# Patient Record
Sex: FEMALE | Race: WHITE | NOT HISPANIC OR LATINO | Employment: OTHER | URBAN - METROPOLITAN AREA
[De-identification: names, ages, dates, MRNs, and addresses within clinical notes are randomized per-mention and may not be internally consistent; named-entity substitution may affect disease eponyms.]

---

## 2017-05-01 ENCOUNTER — OFFICE VISIT (OUTPATIENT)
Dept: URGENT CARE | Facility: PHYSICIAN GROUP | Age: 27
End: 2017-05-01
Payer: COMMERCIAL

## 2017-05-01 ENCOUNTER — HOSPITAL ENCOUNTER (OUTPATIENT)
Facility: MEDICAL CENTER | Age: 27
End: 2017-05-01
Attending: NURSE PRACTITIONER
Payer: COMMERCIAL

## 2017-05-01 VITALS
RESPIRATION RATE: 18 BRPM | TEMPERATURE: 99.1 F | OXYGEN SATURATION: 97 % | HEART RATE: 120 BPM | SYSTOLIC BLOOD PRESSURE: 108 MMHG | DIASTOLIC BLOOD PRESSURE: 84 MMHG | WEIGHT: 135 LBS

## 2017-05-01 DIAGNOSIS — J02.9 ACUTE PHARYNGITIS, UNSPECIFIED ETIOLOGY: ICD-10-CM

## 2017-05-01 LAB
INT CON NEG: NEGATIVE
INT CON POS: POSITIVE
S PYO AG THROAT QL: NORMAL

## 2017-05-01 PROCEDURE — 87880 STREP A ASSAY W/OPTIC: CPT | Performed by: NURSE PRACTITIONER

## 2017-05-01 PROCEDURE — 99214 OFFICE O/P EST MOD 30 MIN: CPT | Performed by: NURSE PRACTITIONER

## 2017-05-01 PROCEDURE — 87070 CULTURE OTHR SPECIMN AEROBIC: CPT

## 2017-05-01 RX ORDER — ETONOGESTREL AND ETHINYL ESTRADIOL VAGINAL RING .015; .12 MG/D; MG/D
1 RING VAGINAL
Status: ON HOLD | COMMUNITY
End: 2020-11-24

## 2017-05-01 RX ORDER — HYDROCODONE BITARTRATE AND ACETAMINOPHEN 5; 325 MG/1; MG/1
1-2 TABLET ORAL EVERY 6 HOURS PRN
Qty: 10 TAB | Refills: 0 | Status: ON HOLD | OUTPATIENT
Start: 2017-05-01 | End: 2020-11-24

## 2017-05-01 ASSESSMENT — ENCOUNTER SYMPTOMS
STRIDOR: 0
VOMITING: 0
HEADACHES: 0
SPUTUM PRODUCTION: 0
SWOLLEN GLANDS: 1
WEAKNESS: 0
DIARRHEA: 0
NAUSEA: 0
TROUBLE SWALLOWING: 1
SORE THROAT: 1
SHORTNESS OF BREATH: 0
COUGH: 0
MYALGIAS: 0
CHILLS: 0
FEVER: 0

## 2017-05-01 NOTE — PATIENT INSTRUCTIONS

## 2017-05-01 NOTE — PROGRESS NOTES
Subjective:      Celestine Khalil is a 26 y.o. female who presents with Pharyngitis            HPI Comments: Medications, Allergies and Prior Medical Hx reviewed and updated in [facility].with patient/family today         Pharyngitis   This is a new problem. The current episode started in the past 7 days (4 days). The problem has been gradually worsening. The pain is worse on the right side. There has been no fever. The pain is at a severity of 7/10. Associated symptoms include ear pain, swollen glands and trouble swallowing. Pertinent negatives include no congestion, coughing, diarrhea, ear discharge, headaches, plugged ear sensation, shortness of breath, stridor or vomiting. She has tried NSAIDs for the symptoms. The treatment provided mild relief.       Review of Systems   Constitutional: Positive for malaise/fatigue. Negative for fever and chills.   HENT: Positive for ear pain, sore throat and trouble swallowing. Negative for congestion and ear discharge.    Respiratory: Negative for cough, sputum production, shortness of breath and stridor.    Gastrointestinal: Negative for nausea, vomiting and diarrhea.   Musculoskeletal: Negative for myalgias.   Neurological: Negative for weakness and headaches.          Objective:     /84 mmHg  Pulse 120  Temp(Src) 37.3 °C (99.1 °F)  Resp 18  Wt 61.236 kg (135 lb)  SpO2 97%     Physical Exam   Constitutional: She appears well-developed and well-nourished. No distress.   HENT:   Head: Normocephalic and atraumatic.   Right Ear: Tympanic membrane and ear canal normal.   Left Ear: Tympanic membrane and ear canal normal.   Nose: No rhinorrhea.   Mouth/Throat: Uvula is midline and mucous membranes are normal. No trismus in the jaw. No uvula swelling. Posterior oropharyngeal edema and posterior oropharyngeal erythema present. No oropharyngeal exudate.   Eyes: Conjunctivae are normal. Pupils are equal, round, and reactive to light.   Neck: Neck supple.   Cardiovascular:  Normal rate, regular rhythm and normal heart sounds.    Pulmonary/Chest: Effort normal and breath sounds normal. No respiratory distress. She has no wheezes.   Lymphadenopathy:     She has cervical adenopathy.   Neurological: She is alert.   Skin: Skin is warm and dry.   Psychiatric: She has a normal mood and affect. Her behavior is normal.   Vitals reviewed.              Assessment/Plan:       1. Acute pharyngitis, unspecified etiology  lidocaine viscous 2% (XYLOCAINE) 2 % Solution    hydrocodone-acetaminophen (NORCO) 5-325 MG Tab per tablet    CULTURE THROAT    POCT Rapid Strep A       Rapid Strep - negative    Rest, Fluids, tylenol, ibuprofen, otc throat lozenges, gargle with warm salt water,   Pt will go to the ER for worsening or changing symptoms as discussed,  Follow-up with your primary care provider or return here if not improving in 5 days   Discharge instructions discussed with pt/family who verbalize understanding and agreement with poc    Significant Indicator NEG     Source THRT     Upper Respiratory Culture, Res Heavy growth usual upper respiratory armani   No group A beta Streptococcus isolated.

## 2017-05-01 NOTE — MR AVS SNAPSHOT
Celestine Khalil   2017 9:45 AM   Office Visit   MRN: [id number]    Department:  [facility]   Dept Phone:  [phone]    Description:  Female : 1990   Provider:  DAYDAY Hoff           Reason for Visit     Pharyngitis x3days/       Allergies as of 2017     Allergen Noted Reactions    Penicillins 2007   Hives      You were diagnosed with     Acute pharyngitis, unspecified etiology   [[id number]]         Vital Signs     Blood Pressure Pulse Temperature Respirations Weight Oxygen Saturation    108/84 mmHg 120 37.3 °C (99.1 °F) 18 61.236 kg (135 lb) 97%      Basic Information     Date Of Birth Sex Race Ethnicity Preferred Language    1990 Female White Unknown English      Health Maintenance        Date Due Completion Dates    IMM HEP B VACCINE (1 of 3 - Primary Series) 1990 ---    IMM HEP A VACCINE (1 of 2 - Standard Series) 1991 ---    IMM HPV VACCINE (1 of 3 - Female 3 Dose Series) 2001 ---    IMM VARICELLA (CHICKENPOX) VACCINE (1 of 2 - 2 Dose Adolescent Series) 2003 ---    IMM DTaP/Tdap/Td Vaccine (1 - Tdap) 2009 ---    PAP SMEAR 2011 ---            Current Immunizations     No immunizations on file.      Below and/or attached are the medications your provider expects you to take. Review all of your home medications and newly ordered medications with your provider and/or pharmacist. Follow medication instructions as directed by your provider and/or pharmacist. Please keep your medication list with you and share with your provider. Update the information when medications are discontinued, doses are changed, or new medications (including over-the-counter products) are added; and carry medication information at all times in the event of emergency situations     Allergies:  PENICILLINS - Hives               Medications  Valid as of: May 01, 2017 - 10:00 AM    Generic Name Brand Name Tablet Size Instructions for use    Azithromycin (Tab)  ZITHROMAX 250 MG Take 2 tabs by mouth once today, then one tab by mouth once daily days 2-5.  Complete all medication.        Drospirenone-Ethinyl Estradiol   by Oral route QDAY.         Etonogestrel-Ethinyl Estradiol (RING) NUVARING 0.12-0.015 MG/24HR Insert 1 Each in vagina.        Hydrocodone-Acetaminophen (Tab) NORCO 5-325 MG Take 1-2 Tabs by mouth every 6 hours as needed.        Lidocaine HCl (Solution) XYLOCAINE 2 % Take 15 mL by mouth as needed for Throat/Mouth Pain.        .                 Medicines prescribed today were sent to:     [id number] - [place], [place] - [address] [place] [address]    [address] [place] [address] [place] [address]    Phone: [phone] Fax: [phone]    Open 24 Hours?: No      Medication refill instructions:       If your prescription bottle indicates you have medication refills left, it is not necessary to call your provider’s office. Please contact your pharmacy and they will refill your medication.    If your prescription bottle indicates you do not have any refills left, you may request refills at any time through one of the following ways: The online Secure64 system (except Urgent Care), by calling your provider’s office, or by asking your pharmacy to contact your provider’s office with a refill request. Medication refills are processed only during regular business hours and may not be available until the next business day. Your provider may request additional information or to have a follow-up visit with you prior to refilling your medication.   *Please Note: Medication refills are assigned a new Rx number when refilled electronically. Your pharmacy may indicate that no refills were authorized even though a new prescription for the same medication is available at the pharmacy. Please request the medicine by name with the pharmacy before contacting your provider for a refill.        Your To Do List     Future Labs/Procedures Complete By Expires    CULTURE THROAT  As directed 5/1/2018         Instructions    Pharyngitis  Pharyngitis is redness, pain, and swelling (inflammation) of your pharynx.   CAUSES   Pharyngitis is usually caused by infection. Most of the time, these infections are from viruses (viral) and are part of a cold. However, sometimes pharyngitis is caused by bacteria (bacterial). Pharyngitis can also be caused by allergies. Viral pharyngitis may be spread from person to person by coughing, sneezing, and personal items or utensils (cups, forks, spoons, toothbrushes). Bacterial pharyngitis may be spread from person to person by more intimate contact, such as kissing.   SIGNS AND SYMPTOMS   Symptoms of pharyngitis include:    · Sore throat.    · Tiredness (fatigue).    · Low-grade fever.    · Headache.  · Joint pain and muscle aches.  · Skin rashes.  · Swollen lymph nodes.  · Plaque-like film on throat or tonsils (often seen with bacterial pharyngitis).  DIAGNOSIS   Your health care provider will ask you questions about your illness and your symptoms. Your medical history, along with a physical exam, is often all that is needed to diagnose pharyngitis. Sometimes, a rapid strep test is done. Other lab tests may also be done, depending on the suspected cause.   TREATMENT   Viral pharyngitis will usually get better in 3-4 days without the use of medicine. Bacterial pharyngitis is treated with medicines that kill germs (antibiotics).   HOME CARE INSTRUCTIONS   · Drink enough water and fluids to keep your urine clear or pale yellow.    · Only take over-the-counter or prescription medicines as directed by your health care provider:    ¨ If you are prescribed antibiotics, make sure you finish them even if you start to feel better.    ¨ Do not take aspirin.    · Get lots of rest.    · Gargle with 8 oz of salt water (½ tsp of salt per 1 qt of water) as often as every 1-2 hours to soothe your throat.    · Throat lozenges (if you are not at risk for choking) or sprays may be used to soothe your  throat.  SEEK MEDICAL CARE IF:   · You have large, tender lumps in your neck.  · You have a rash.  · You cough up green, yellow-brown, or bloody spit.  SEEK IMMEDIATE MEDICAL CARE IF:   · Your neck becomes stiff.  · You drool or are unable to swallow liquids.  · You vomit or are unable to keep medicines or liquids down.  · You have severe pain that does not go away with the use of recommended medicines.  · You have trouble breathing (not caused by a stuffy nose).  MAKE SURE YOU:   · Understand these instructions.  · Will watch your condition.  · Will get help right away if you are not doing well or get worse.     This information is not intended to replace advice given to you by your health care provider. Make sure you discuss any questions you have with your health care provider.     Document Released: 12/18/2006 Document Revised: 10/08/2014 Document Reviewed: 08/25/2014  [employer] Patient Education ©2016 Elsevier Inc.            [employer] Access Code: [email or web address]  Expires: 5/31/2017 10:00 AM    Your email address is not on file at [facility].  Email Addresses are required for you to sign up for [employer], please contact [phone] to verify your personal information and to provide your email address prior to attempting to register for [employer].    Celestine Khalil  [address] [place] [address]    [employer]  A secure, online tool to manage your health information     [facility]’s [employer]® is a secure, online tool that connects you to your personalized health information from the privacy of your home -- day or night - making it very easy for you to manage your healthcare. Once the activation process is completed, you can even access your medical information using the [employer] sadaf, which is available for free in the Apple Sadaf store or Google Play store.     To learn more about [employer], visit [email or web address]    There are two levels of access available (as shown below):   My Chart  Features  [facility]  Specialists [facility]  Urgent  Care Non-[facility] Primary [facility] Doctor   Email your healthcare team securely and privately 24/7 X X X    Manage appointments: schedule your next appointment; view details of past/upcoming appointments X      Request prescription refills. X      View recent personal medical records, including lab and immunizations X X X X   View health record, including health history, allergies, medications X X X X   Read reports about your outpatient visits, procedures, consult and ER notes X X X X   See your discharge summary, which is a recap of your hospital and/or ER visit that includes your diagnosis, lab results, and care plan X X  X     How to register for [email or web address]:  Once your e-mail address has been verified, follow the following steps to sign up for [email or web address].     1. Go to  [email or web address]  2. Click on the Sign Up Now box, which takes you to the New Member Sign Up page. You will need to provide the following information:  a. Enter your [email or web address] Access Code exactly as it appears at the top of this page. (You will not need to use this code after you’ve completed the sign-up process. If you do not sign up before the expiration date, you must request a new code.)   b. Enter your date of birth.   c. Enter your home email address.   d. Click Submit, and follow the next screen’s instructions.  3. Create a [email or web address] ID. This will be your [email or web address] login ID and cannot be changed, so think of one that is secure and easy to remember.  4. Create a [email or web address] password. You can change your password at any time.  5. Enter your Password Reset Question and Answer. This can be used at a later time if you forget your password.   6. Enter your e-mail address. This allows you to receive e-mail notifications when new information is available in [email or web address].  7. Click Sign Up. You can now view your health information.    For assistance activating your [email or web address] account, call [phone]

## 2017-05-03 LAB
BACTERIA SPEC RESP CULT: NORMAL
SIGNIFICANT IND 70042: NORMAL
SOURCE SOURCE: NORMAL

## 2018-05-01 ENCOUNTER — TELEMEDICINE2 (OUTPATIENT)
Dept: URGENT CARE | Facility: CLINIC | Age: 28
End: 2018-05-01
Payer: COMMERCIAL

## 2018-05-01 VITALS — WEIGHT: 138 LBS | HEIGHT: 63 IN | BODY MASS INDEX: 24.45 KG/M2

## 2018-05-01 DIAGNOSIS — J01.00 ACUTE NON-RECURRENT MAXILLARY SINUSITIS: ICD-10-CM

## 2018-05-01 PROCEDURE — 99214 OFFICE O/P EST MOD 30 MIN: CPT | Performed by: PHYSICIAN ASSISTANT

## 2018-05-01 RX ORDER — DOXYCYCLINE HYCLATE 100 MG/1
100 CAPSULE ORAL 2 TIMES DAILY
Qty: 14 CAP | Refills: 0 | Status: SHIPPED | OUTPATIENT
Start: 2018-05-01 | End: 2018-05-08

## 2018-05-01 ASSESSMENT — ENCOUNTER SYMPTOMS
SINUS PAIN: 1
SHORTNESS OF BREATH: 0
WHEEZING: 0
SORE THROAT: 0
CHILLS: 0
SINUS PRESSURE: 1
FEVER: 0
PALPITATIONS: 0
EYE REDNESS: 0
HEADACHES: 1
COUGH: 0
EYE DISCHARGE: 0
EYE PAIN: 0
DIZZINESS: 1

## 2018-05-01 ASSESSMENT — PATIENT HEALTH QUESTIONNAIRE - PHQ9: CLINICAL INTERPRETATION OF PHQ2 SCORE: 0

## 2018-05-01 NOTE — PROGRESS NOTES
Subjective:      Celestine Khalil is a 27 y.o. female who presents with Sinus Problem ([id number] Coughing, stuffy nose, body aches)            Sinus Problem   This is a new problem. The current episode started 1 to 4 weeks ago. The problem is unchanged. The pain is moderate. Associated symptoms include congestion, ear pain, headaches and sinus pressure. Pertinent negatives include no chills, coughing, shortness of breath or sore throat. Past treatments include lying down, oral decongestants and saline sprays. The treatment provided no relief.       Review of Systems   Constitutional: Positive for malaise/fatigue. Negative for chills and fever.   HENT: Positive for congestion, ear pain, sinus pain and sinus pressure. Negative for sore throat.    Eyes: Negative for pain, discharge and redness.   Respiratory: Negative for cough, shortness of breath and wheezing.    Cardiovascular: Negative for chest pain and palpitations.   Neurological: Positive for dizziness and headaches.   All other systems reviewed and are negative.    PMH:  has no past medical history on file.  MEDS:   Current Outpatient Prescriptions:   •  Levonorgestrel (KYLEENA IU), by Intrauterine route., Disp: , Rfl:   •  doxycycline (VIBRAMYCIN) 100 MG Cap, Take 1 Cap by mouth 2 times a day for 7 days., Disp: 14 Cap, Rfl: 0  •  ethinyl estradiol-etonogestrel (NUVARING) 0.12-0.015 MG/24HR vaginal ring, Insert 1 Each in vagina., Disp: , Rfl:   •  lidocaine viscous 2% (XYLOCAINE) 2 % Solution, Take 15 mL by mouth as needed for Throat/Mouth Pain., Disp: 120 mL, Rfl: 0  •  hydrocodone-acetaminophen (NORCO) 5-325 MG Tab per tablet, Take 1-2 Tabs by mouth every 6 hours as needed., Disp: 10 Tab, Rfl: 0  •  azithromycin (ZITHROMAX) 250 MG Tab, Take 2 tabs by mouth once today, then one tab by mouth once daily days 2-5.  Complete all medication., Disp: 6 Tab, Rfl: 0  •  BEBETO PO, by Oral route QDAY. , Disp: , Rfl:   ALLERGIES:   Allergies   Allergen Reactions   •  "Penicillins Hives     SURGHX:   Past Surgical History:   Procedure Laterality Date   • TONSILLECTOMY  7/9/08    Performed by NATHAN ROBERTSON at SURGERY SAME DAY [facility] ORS   • ETHMOIDECTOMY  7/9/08    Performed by NATHAN ROBERTSON at SURGERY SAME DAY [facility] ORS   • ANTROSTOMY  7/9/08    Performed by NATHAN ROBERTSON at SURGERY SAME DAY [facility] ORS   • TURBINOPLASTY  7/9/08    Performed by NATHAN ROBERTSON at SURGERY SAME DAY [facility] ORS   • OTHER SURGICAL PROCEDURE      benign lymph node removed, right groin, 2005     SOCHX:  reports that she has never smoked. She does not have any smokeless tobacco history on file. She reports that she does not drink alcohol or use drugs.  FH: Family history was reviewed, no pertinent findings to report  Medications, Allergies, and current problem list reviewed today in Epic       Objective:     Ht 1.6 m (5' 3\")   Wt 62.6 kg (138 lb)   Breastfeeding? No   BMI 24.45 kg/m²      Physical Exam   Constitutional: She is oriented to person, place, and time. She appears well-developed and well-nourished.   Neck: Normal range of motion.   Neurological: She is alert and oriented to person, place, and time.               Assessment/Plan:     1. Acute non-recurrent maxillary sinusitis    - doxycycline (VIBRAMYCIN) 100 MG Cap; Take 1 Cap by mouth 2 times a day for 7 days.  Dispense: 14 Cap; Refill: 0    Encounter was completed using a secure and encrypted videoconferencing equipment, the patient gave verbal consent and patient identification was confirmed.    Differential diagnosis, natural history, supportive care discussed. Follow-up with primary care provider within 7-10 days, emergency room precautions discussed.  Patient and/or family appears understanding of information.    "

## 2019-10-27 ENCOUNTER — OFFICE VISIT (OUTPATIENT)
Dept: URGENT CARE | Facility: PHYSICIAN GROUP | Age: 29
End: 2019-10-27
Payer: COMMERCIAL

## 2019-10-27 VITALS
HEIGHT: 64 IN | SYSTOLIC BLOOD PRESSURE: 100 MMHG | HEART RATE: 93 BPM | TEMPERATURE: 98.2 F | WEIGHT: 143 LBS | DIASTOLIC BLOOD PRESSURE: 76 MMHG | OXYGEN SATURATION: 99 % | BODY MASS INDEX: 24.41 KG/M2

## 2019-10-27 DIAGNOSIS — J30.2 SEASONAL ALLERGIES: ICD-10-CM

## 2019-10-27 DIAGNOSIS — J32.9 SINUSITIS, UNSPECIFIED CHRONICITY, UNSPECIFIED LOCATION: ICD-10-CM

## 2019-10-27 PROCEDURE — 99214 OFFICE O/P EST MOD 30 MIN: CPT | Performed by: NURSE PRACTITIONER

## 2019-10-27 RX ORDER — AZITHROMYCIN 250 MG/1
TABLET, FILM COATED ORAL
Qty: 6 TAB | Refills: 0 | Status: SHIPPED | OUTPATIENT
Start: 2019-10-27 | End: 2019-10-31

## 2019-10-27 ASSESSMENT — ENCOUNTER SYMPTOMS
SPUTUM PRODUCTION: 0
HEADACHES: 0
SHORTNESS OF BREATH: 0
SINUS PRESSURE: 1
WHEEZING: 0
NECK PAIN: 0
SWOLLEN GLANDS: 0
COUGH: 1
HEARTBURN: 0
ABDOMINAL PAIN: 0
CHILLS: 1
SORE THROAT: 1

## 2019-10-27 NOTE — PROGRESS NOTES
Subjective:      Celestine Khalil is a 29 y.o. female who presents with Sinus Problem (head cold, sinus infection, pressure in head, cough )            Sinus Problem   This is a new problem. Episode onset: 7 days. The problem has been gradually worsening since onset. There has been no fever (chills). The pain is mild. Associated symptoms include chills, congestion, coughing, sinus pressure and a sore throat. Pertinent negatives include no headaches, neck pain, shortness of breath, sneezing or swollen glands. (Patient states she has a history of recurrent sinus infections, and seasonal allergies, no history of asthma) Treatments tried: Dayquil and nyquil, Claritin. The treatment provided mild relief.       Review of Systems   Constitutional: Positive for chills.   HENT: Positive for congestion, sinus pressure and sore throat. Negative for sneezing.         Ear pressure, sinus pressure   Respiratory: Positive for cough. Negative for sputum production, shortness of breath and wheezing.    Gastrointestinal: Negative for abdominal pain and heartburn.   Musculoskeletal: Negative for neck pain.   Neurological: Negative for headaches.     History reviewed. No pertinent past medical history.   Past Surgical History:   Procedure Laterality Date   • TONSILLECTOMY  7/9/08    Performed by NATHAN ROBERTSON at SURGERY SAME DAY [facility] ORS   • ETHMOIDECTOMY  7/9/08    Performed by NATHAN ROBERTSON at SURGERY SAME DAY [facility] ORS   • ANTROSTOMY  7/9/08    Performed by NATHAN ROBERTSON at SURGERY SAME DAY [facility] ORS   • TURBINOPLASTY  7/9/08    Performed by NATHAN ROBERTSON at SURGERY SAME DAY [facility] ORS   • OTHER SURGICAL PROCEDURE      benign lymph node removed, right groin, 2005      Social History     Socioeconomic History   • Marital status: Single     Spouse name: Not on file   • Number of children: Not on file   • Years of education: Not on file   • Highest education level: Not on file   Occupational  "History   • Not on file   Social Needs   • Financial resource strain: Not on file   • Food insecurity:     Worry: Not on file     Inability: Not on file   • Transportation needs:     Medical: Not on file     Non-medical: Not on file   Tobacco Use   • Smoking status: Never Smoker   • Smokeless tobacco: Never Used   Substance and Sexual Activity   • Alcohol use: No   • Drug use: No   • Sexual activity: Not on file   Lifestyle   • Physical activity:     Days per week: Not on file     Minutes per session: Not on file   • Stress: Not on file   Relationships   • Social connections:     Talks on phone: Not on file     Gets together: Not on file     Attends Mosque service: Not on file     Active member of club or organization: Not on file     Attends meetings of clubs or organizations: Not on file     Relationship status: Not on file   • Intimate partner violence:     Fear of current or ex partner: Not on file     Emotionally abused: Not on file     Physically abused: Not on file     Forced sexual activity: Not on file   Other Topics Concern   • Not on file   Social History Narrative   • Not on file    Allergies: Penicillins           Objective:     /76   Pulse 93   Temp 36.8 °C (98.2 °F)   Ht 1.626 m (5' 4\")   Wt 64.9 kg (143 lb)   SpO2 99%   BMI 24.55 kg/m²      Physical Exam   Constitutional: She is oriented to person, place, and time. Vital signs are normal. She appears well-developed and well-nourished.   HENT:   Head: Normocephalic and atraumatic.   Right Ear: Tympanic membrane normal.   Left Ear: Tympanic membrane is bulging.   Nose: Rhinorrhea present.   Mouth/Throat: Uvula is midline, oropharynx is clear and moist and mucous membranes are normal. Tonsils are 0 on the right. Tonsils are 0 on the left.   Post nasal drip noted   Neck: Normal range of motion. Neck supple.   Cardiovascular: Normal rate, regular rhythm and normal heart sounds.   Pulmonary/Chest: Effort normal and breath sounds normal. "   Lymphadenopathy:     She has cervical adenopathy.   Neurological: She is alert and oriented to person, place, and time.   Skin: Skin is warm and dry.   Psychiatric: She has a normal mood and affect. Her behavior is normal. Judgment and thought content normal.   Vitals reviewed.              Assessment/Plan:     1. Sinusitis, unspecified chronicity, unspecified location  - azithromycin (ZITHROMAX) 250 MG Tab; Take one tablet the first day and then one table for the next four days  Dispense: 6 Tab; Refill: 0  Contingent antibiotic prescription given to patient to fill upon meeting criteria of guidelines discussed.     2. Seasonal allergies  Educated patient to continue taking Claritin, may include over-the-counter Flonase.  May continue to take NyQuil and DayQuil as needed for symptoms as well as increase fluids to thin secretions.     Supportive care, differential diagnoses, and indications for immediate follow-up discussed with patient.    Pathogenesis of diagnosis discussed including typical length and natural progression of illness. Patient expresses understanding and agrees to plan.       Please note that this dictation was created using voice recognition software. I have made every reasonable attempt to correct obvious errors, but I expect that there are errors of grammar and possibly content that I did not discover before finalizing the note.

## 2020-04-13 LAB
ABO GROUP BLD: NORMAL
HBV SURFACE AG SERPL QL IA: NORMAL
HIV 1+2 AB+HIV1 P24 AG SERPL QL IA: NORMAL
RH BLD: NORMAL
RUBV IGG SERPL IA-ACNC: NORMAL
TREPONEMA PALLIDUM IGG+IGM AB [PRESENCE] IN SERUM OR PLASMA BY IMMUNOASSAY: NORMAL

## 2020-09-09 LAB
GLUCOSE 1H P CHAL SERPL-MCNC: 139 MG/DL
GLUCOSE 2H P CHAL SERPL-MCNC: 104 MG/DL
GLUCOSE 3H P CHAL SERPL-MCNC: 105 MG/DL

## 2020-11-02 LAB — STREP GP B DNA PCR: NEGATIVE

## 2020-11-19 ENCOUNTER — HOSPITAL ENCOUNTER (OUTPATIENT)
Dept: OBGYN | Facility: MEDICAL CENTER | Age: 30
End: 2020-11-19
Attending: OBSTETRICS & GYNECOLOGY
Payer: COMMERCIAL

## 2020-11-19 LAB
COVID ORDER STATUS COVID19: NORMAL
SARS-COV+SARS-COV-2 AG RESP QL IA.RAPID: NOTDETECTED
SPECIMEN SOURCE: NORMAL

## 2020-11-19 PROCEDURE — C9803 HOPD COVID-19 SPEC COLLECT: HCPCS | Performed by: OBSTETRICS & GYNECOLOGY

## 2020-11-19 PROCEDURE — 87426 SARSCOV CORONAVIRUS AG IA: CPT

## 2020-11-19 NOTE — PROGRESS NOTES
PT arrived for prescreening covid swab. POC discussed with pt. All questions answered. Discharge self isolating instructions given/discussed. Swab collected and sent. PT ambulated out in stable condition.

## 2020-11-20 NOTE — H&P
DATE OF SCHEDULED SURGERY:  2020.    CHIEF COMPLAINT:  Here for induction of labor.    HISTORY OF PRESENT ILLNESS:  The patient is a 30-year-old  who presents   to [facility] and Delivery at 39 weeks and 2 days for term elective   induction of labor.  She received her prenatal care with myself this   pregnancy.  It was largely uncomplicated.  She declined any genetic or carrier   screening with this pregnancy.  She had a complete anatomic scan at 19 weeks   and 3 days, demonstrating a normal-appearing female fetus.    REVIEW OF SYSTEMS:  All other systems were reviewed and were found to be   negative.    PAST MEDICAL HISTORY:  Acid reflux.    PAST SURGICAL HISTORY:  Lymph node biopsy, myringotomy, sinus surgery, and   tonsils and adenoid removal.    OBSTETRICAL HISTORY:  This is her first pregnancy.    GYNECOLOGIC HISTORY:  She denies history of sexually transmitted infection   including herpes simplex virus for herself or her spouse.  She denies a   history of abnormal Pap smears.  Most recent Pap smear was negative for   intraepithelial lesions or malignancies in .    FAMILY HISTORY:  Significant for breast cancer, coronary artery disease,   hypertension, diabetes and ovarian cancer.    SOCIAL HISTORY:  Denies tobacco, alcohol or illicit drug use.    MEDICATIONS:  Prenatal vitamins.    ALLERGIES:  No known drug allergies.    PHYSICAL EXAMINATION:  In office on 2020:  VITAL SIGNS:  Her blood pressure was 116/74, her weight was 189 pounds.  GENERAL:  She was in no apparent distress.  ABDOMEN:  Gravid, nontender.  EXTREMITIES:  No edema.  PELVIC:  Her sterile vaginal exam was 4 cm dilated, 80% effaced, -2 station.    PERTINENT LABORATORY RESULTS:  She is ABO O negative; she received RhoGAM on   .  She failed her 1-hour glucose tolerance test and passed her 3-hour   glucose tolerance test.  Her group B strep was negative.  She is HIV negative,   hepatitis B negative, hepatitis C negative.   She is RPR negative and rubella   immune.    ASSESSMENT:  1.  Term intrauterine pregnancy at 39 weeks and 2 days.  2.  Rh negative.    PLAN:  The patient will be admitted to labor and delivery for induction of   labor with Pitocin and artificial rupture of membranes.  She may have an   epidural on demand.  She will need to be assessed for administration of RhoGAM   postpartum.       ____________________________________     MD SHUBHAM Wilder / YOBANI    DD:  11/19/2020 17:53:04  DT:  11/19/2020 22:12:47    D#:  [id number]  Job#:  [id number]

## 2020-11-22 ENCOUNTER — HOSPITAL ENCOUNTER (INPATIENT)
Facility: MEDICAL CENTER | Age: 30
LOS: 2 days | End: 2020-11-25
Attending: OBSTETRICS & GYNECOLOGY | Admitting: OBSTETRICS & GYNECOLOGY
Payer: COMMERCIAL

## 2020-11-22 ENCOUNTER — APPOINTMENT (OUTPATIENT)
Dept: OBGYN | Facility: MEDICAL CENTER | Age: 30
End: 2020-11-22
Attending: OBSTETRICS & GYNECOLOGY
Payer: COMMERCIAL

## 2020-11-22 DIAGNOSIS — R52 PAIN RELATED TO VAGINAL DELIVERY: ICD-10-CM

## 2020-11-22 PROCEDURE — 36415 COLL VENOUS BLD VENIPUNCTURE: CPT

## 2020-11-22 PROCEDURE — 85025 COMPLETE CBC W/AUTO DIFF WBC: CPT

## 2020-11-22 SDOH — HEALTH STABILITY: MENTAL HEALTH: HOW OFTEN DO YOU HAVE 6 OR MORE DRINKS ON ONE OCCASION?: NEVER

## 2020-11-22 SDOH — ECONOMIC STABILITY: FOOD INSECURITY: WITHIN THE PAST 12 MONTHS, YOU WORRIED THAT YOUR FOOD WOULD RUN OUT BEFORE YOU GOT MONEY TO BUY MORE.: PATIENT DECLINED

## 2020-11-22 SDOH — ECONOMIC STABILITY: TRANSPORTATION INSECURITY
IN THE PAST 12 MONTHS, HAS LACK OF TRANSPORTATION KEPT YOU FROM MEETINGS, WORK, OR FROM GETTING THINGS NEEDED FOR DAILY LIVING?: PATIENT DECLINED

## 2020-11-22 SDOH — ECONOMIC STABILITY: TRANSPORTATION INSECURITY
IN THE PAST 12 MONTHS, HAS THE LACK OF TRANSPORTATION KEPT YOU FROM MEDICAL APPOINTMENTS OR FROM GETTING MEDICATIONS?: PATIENT DECLINED

## 2020-11-22 SDOH — HEALTH STABILITY: MENTAL HEALTH: HOW OFTEN DO YOU HAVE A DRINK CONTAINING ALCOHOL?: NEVER

## 2020-11-22 SDOH — ECONOMIC STABILITY: FOOD INSECURITY: WITHIN THE PAST 12 MONTHS, THE FOOD YOU BOUGHT JUST DIDN'T LAST AND YOU DIDN'T HAVE MONEY TO GET MORE.: PATIENT DECLINED

## 2020-11-22 ASSESSMENT — LIFESTYLE VARIABLES
EVER_SMOKED: NEVER
HAVE YOU EVER FELT YOU SHOULD CUT DOWN ON YOUR DRINKING: NO
EVER FELT BAD OR GUILTY ABOUT YOUR DRINKING: NO
TOTAL SCORE: 0
ALCOHOL_USE: NO
TOTAL SCORE: 0
EVER HAD A DRINK FIRST THING IN THE MORNING TO STEADY YOUR NERVES TO GET RID OF A HANGOVER: NO
DOES PATIENT WANT TO STOP DRINKING: NO
CONSUMPTION TOTAL: INCOMPLETE
TOTAL SCORE: 0
HAVE PEOPLE ANNOYED YOU BY CRITICIZING YOUR DRINKING: NO

## 2020-11-22 ASSESSMENT — COPD QUESTIONNAIRES
COPD SCREENING SCORE: 0
DURING THE PAST 4 WEEKS HOW MUCH DID YOU FEEL SHORT OF BREATH: NONE/LITTLE OF THE TIME
IN THE PAST 12 MONTHS DO YOU DO LESS THAN YOU USED TO BECAUSE OF YOUR BREATHING PROBLEMS: DISAGREE/UNSURE
DO YOU EVER COUGH UP ANY MUCUS OR PHLEGM?: NO/ONLY WITH OCCASIONAL COLDS OR INFECTIONS
HAVE YOU SMOKED AT LEAST 100 CIGARETTES IN YOUR ENTIRE LIFE: NO/DON'T KNOW

## 2020-11-22 ASSESSMENT — PATIENT HEALTH QUESTIONNAIRE - PHQ9
2. FEELING DOWN, DEPRESSED, IRRITABLE, OR HOPELESS: NOT AT ALL
1. LITTLE INTEREST OR PLEASURE IN DOING THINGS: NOT AT ALL
SUM OF ALL RESPONSES TO PHQ9 QUESTIONS 1 AND 2: 0

## 2020-11-22 ASSESSMENT — PAIN SCALES - GENERAL: PAINLEVEL: 0 - NO PAIN

## 2020-11-23 ENCOUNTER — ANESTHESIA EVENT (OUTPATIENT)
Dept: ANESTHESIOLOGY | Facility: MEDICAL CENTER | Age: 30
End: 2020-11-23
Payer: COMMERCIAL

## 2020-11-23 ENCOUNTER — ANESTHESIA (OUTPATIENT)
Dept: ANESTHESIOLOGY | Facility: MEDICAL CENTER | Age: 30
End: 2020-11-23
Payer: COMMERCIAL

## 2020-11-23 LAB
BASOPHILS # BLD AUTO: 0.3 % (ref 0–1.8)
BASOPHILS # BLD: 0.03 K/UL (ref 0–0.12)
EOSINOPHIL # BLD AUTO: 0.07 K/UL (ref 0–0.51)
EOSINOPHIL NFR BLD: 0.6 % (ref 0–6.9)
ERYTHROCYTE [DISTWIDTH] IN BLOOD BY AUTOMATED COUNT: 43.3 FL (ref 35.9–50)
HCT VFR BLD AUTO: 40.5 % (ref 37–47)
HGB BLD-MCNC: 13.9 G/DL (ref 12–16)
HOLDING TUBE BB 8507: NORMAL
IMM GRANULOCYTES # BLD AUTO: 0.07 K/UL (ref 0–0.11)
IMM GRANULOCYTES NFR BLD AUTO: 0.6 % (ref 0–0.9)
LYMPHOCYTES # BLD AUTO: 2.03 K/UL (ref 1–4.8)
LYMPHOCYTES NFR BLD: 17.6 % (ref 22–41)
MCH RBC QN AUTO: 31.5 PG (ref 27–33)
MCHC RBC AUTO-ENTMCNC: 34.3 G/DL (ref 33.6–35)
MCV RBC AUTO: 91.8 FL (ref 81.4–97.8)
MONOCYTES # BLD AUTO: 0.84 K/UL (ref 0–0.85)
MONOCYTES NFR BLD AUTO: 7.3 % (ref 0–13.4)
NEUTROPHILS # BLD AUTO: 8.52 K/UL (ref 2–7.15)
NEUTROPHILS NFR BLD: 73.6 % (ref 44–72)
NRBC # BLD AUTO: 0 K/UL
NRBC BLD-RTO: 0 /100 WBC
PLATELET # BLD AUTO: 145 K/UL (ref 164–446)
PMV BLD AUTO: 12.4 FL (ref 9–12.9)
RBC # BLD AUTO: 4.41 M/UL (ref 4.2–5.4)
WBC # BLD AUTO: 11.6 K/UL (ref 4.8–10.8)

## 2020-11-23 PROCEDURE — 700105 HCHG RX REV CODE 258: Performed by: OBSTETRICS & GYNECOLOGY

## 2020-11-23 PROCEDURE — 10907ZC DRAINAGE OF AMNIOTIC FLUID, THERAPEUTIC FROM PRODUCTS OF CONCEPTION, VIA NATURAL OR ARTIFICIAL OPENING: ICD-10-PCS | Performed by: OBSTETRICS & GYNECOLOGY

## 2020-11-23 PROCEDURE — 0UQMXZZ REPAIR VULVA, EXTERNAL APPROACH: ICD-10-PCS | Performed by: OBSTETRICS & GYNECOLOGY

## 2020-11-23 PROCEDURE — 700102 HCHG RX REV CODE 250 W/ 637 OVERRIDE(OP): Performed by: OBSTETRICS & GYNECOLOGY

## 2020-11-23 PROCEDURE — 700111 HCHG RX REV CODE 636 W/ 250 OVERRIDE (IP): Performed by: OBSTETRICS & GYNECOLOGY

## 2020-11-23 PROCEDURE — 3E033VJ INTRODUCTION OF OTHER HORMONE INTO PERIPHERAL VEIN, PERCUTANEOUS APPROACH: ICD-10-PCS | Performed by: OBSTETRICS & GYNECOLOGY

## 2020-11-23 PROCEDURE — 303615 HCHG EPIDURAL/SPINAL ANESTHESIA FOR LABOR

## 2020-11-23 PROCEDURE — 700101 HCHG RX REV CODE 250: Performed by: ANESTHESIOLOGY

## 2020-11-23 PROCEDURE — 10H07YZ INSERTION OF OTHER DEVICE INTO PRODUCTS OF CONCEPTION, VIA NATURAL OR ARTIFICIAL OPENING: ICD-10-PCS | Performed by: OBSTETRICS & GYNECOLOGY

## 2020-11-23 PROCEDURE — 304965 HCHG RECOVERY SERVICES

## 2020-11-23 PROCEDURE — A9270 NON-COVERED ITEM OR SERVICE: HCPCS | Performed by: OBSTETRICS & GYNECOLOGY

## 2020-11-23 PROCEDURE — 59409 OBSTETRICAL CARE: CPT

## 2020-11-23 PROCEDURE — 700111 HCHG RX REV CODE 636 W/ 250 OVERRIDE (IP)

## 2020-11-23 PROCEDURE — 700105 HCHG RX REV CODE 258: Performed by: ANESTHESIOLOGY

## 2020-11-23 PROCEDURE — 770002 HCHG ROOM/CARE - OB PRIVATE (112)

## 2020-11-23 PROCEDURE — 0HQ9XZZ REPAIR PERINEUM SKIN, EXTERNAL APPROACH: ICD-10-PCS | Performed by: OBSTETRICS & GYNECOLOGY

## 2020-11-23 RX ORDER — ROPIVACAINE HYDROCHLORIDE 2 MG/ML
INJECTION, SOLUTION EPIDURAL; INFILTRATION; PERINEURAL
Status: COMPLETED
Start: 2020-11-23 | End: 2020-11-23

## 2020-11-23 RX ORDER — SODIUM CHLORIDE, SODIUM LACTATE, POTASSIUM CHLORIDE, CALCIUM CHLORIDE 600; 310; 30; 20 MG/100ML; MG/100ML; MG/100ML; MG/100ML
INJECTION, SOLUTION INTRAVENOUS PRN
Status: DISCONTINUED | OUTPATIENT
Start: 2020-11-23 | End: 2020-11-25 | Stop reason: HOSPADM

## 2020-11-23 RX ORDER — OXYCODONE HYDROCHLORIDE AND ACETAMINOPHEN 5; 325 MG/1; MG/1
1 TABLET ORAL EVERY 4 HOURS PRN
Status: DISCONTINUED | OUTPATIENT
Start: 2020-11-23 | End: 2020-11-25 | Stop reason: HOSPADM

## 2020-11-23 RX ORDER — SODIUM CHLORIDE, SODIUM LACTATE, POTASSIUM CHLORIDE, AND CALCIUM CHLORIDE .6; .31; .03; .02 G/100ML; G/100ML; G/100ML; G/100ML
1000 INJECTION, SOLUTION INTRAVENOUS
Status: COMPLETED | OUTPATIENT
Start: 2020-11-23 | End: 2020-11-23

## 2020-11-23 RX ORDER — SODIUM CHLORIDE, SODIUM LACTATE, POTASSIUM CHLORIDE, CALCIUM CHLORIDE 600; 310; 30; 20 MG/100ML; MG/100ML; MG/100ML; MG/100ML
INJECTION, SOLUTION INTRAVENOUS CONTINUOUS
Status: ACTIVE | OUTPATIENT
Start: 2020-11-23 | End: 2020-11-23

## 2020-11-23 RX ORDER — ONDANSETRON 2 MG/ML
4 INJECTION INTRAMUSCULAR; INTRAVENOUS ONCE
Status: COMPLETED | OUTPATIENT
Start: 2020-11-23 | End: 2020-11-23

## 2020-11-23 RX ORDER — ROPIVACAINE HYDROCHLORIDE 2 MG/ML
INJECTION, SOLUTION EPIDURAL; INFILTRATION; PERINEURAL CONTINUOUS
Status: DISCONTINUED | OUTPATIENT
Start: 2020-11-23 | End: 2020-11-24 | Stop reason: HOSPADM

## 2020-11-23 RX ORDER — OXYCODONE AND ACETAMINOPHEN 10; 325 MG/1; MG/1
1 TABLET ORAL EVERY 4 HOURS PRN
Status: DISCONTINUED | OUTPATIENT
Start: 2020-11-23 | End: 2020-11-25 | Stop reason: HOSPADM

## 2020-11-23 RX ORDER — ONDANSETRON 2 MG/ML
INJECTION INTRAMUSCULAR; INTRAVENOUS
Status: COMPLETED
Start: 2020-11-23 | End: 2020-11-23

## 2020-11-23 RX ORDER — CALCIUM CARBONATE 500 MG/1
1000 TABLET, CHEWABLE ORAL DAILY
Status: DISCONTINUED | OUTPATIENT
Start: 2020-11-23 | End: 2020-11-23

## 2020-11-23 RX ORDER — BISACODYL 10 MG
10 SUPPOSITORY, RECTAL RECTAL PRN
Status: DISCONTINUED | OUTPATIENT
Start: 2020-11-23 | End: 2020-11-25 | Stop reason: HOSPADM

## 2020-11-23 RX ORDER — CARBOPROST TROMETHAMINE 250 UG/ML
250 INJECTION, SOLUTION INTRAMUSCULAR
Status: DISCONTINUED | OUTPATIENT
Start: 2020-11-23 | End: 2020-11-25 | Stop reason: HOSPADM

## 2020-11-23 RX ORDER — VITAMIN A ACETATE, BETA CAROTENE, ASCORBIC ACID, CHOLECALCIFEROL, .ALPHA.-TOCOPHEROL ACETATE, DL-, THIAMINE MONONITRATE, RIBOFLAVIN, NIACINAMIDE, PYRIDOXINE HYDROCHLORIDE, FOLIC ACID, CYANOCOBALAMIN, CALCIUM CARBONATE, FERROUS FUMARATE, ZINC OXIDE, CUPRIC OXIDE 3080; 12; 120; 400; 1; 1.84; 3; 20; 22; 920; 25; 200; 27; 10; 2 [IU]/1; UG/1; MG/1; [IU]/1; MG/1; MG/1; MG/1; MG/1; MG/1; [IU]/1; MG/1; MG/1; MG/1; MG/1; MG/1
1 TABLET, FILM COATED ORAL EVERY MORNING
Status: DISCONTINUED | OUTPATIENT
Start: 2020-11-24 | End: 2020-11-25 | Stop reason: HOSPADM

## 2020-11-23 RX ORDER — SODIUM CHLORIDE, SODIUM LACTATE, POTASSIUM CHLORIDE, AND CALCIUM CHLORIDE .6; .31; .03; .02 G/100ML; G/100ML; G/100ML; G/100ML
250 INJECTION, SOLUTION INTRAVENOUS PRN
Status: DISCONTINUED | OUTPATIENT
Start: 2020-11-23 | End: 2020-11-24 | Stop reason: HOSPADM

## 2020-11-23 RX ORDER — METHYLERGONOVINE MALEATE 0.2 MG/ML
0.2 INJECTION INTRAVENOUS
Status: DISCONTINUED | OUTPATIENT
Start: 2020-11-23 | End: 2020-11-25 | Stop reason: HOSPADM

## 2020-11-23 RX ORDER — IBUPROFEN 600 MG/1
600 TABLET ORAL EVERY 6 HOURS PRN
Status: DISCONTINUED | OUTPATIENT
Start: 2020-11-23 | End: 2020-11-25 | Stop reason: HOSPADM

## 2020-11-23 RX ORDER — IBUPROFEN 600 MG/1
600 TABLET ORAL EVERY 6 HOURS PRN
Status: DISCONTINUED | OUTPATIENT
Start: 2020-11-23 | End: 2020-11-23

## 2020-11-23 RX ORDER — MISOPROSTOL 200 UG/1
600 TABLET ORAL
Status: DISCONTINUED | OUTPATIENT
Start: 2020-11-23 | End: 2020-11-25 | Stop reason: HOSPADM

## 2020-11-23 RX ORDER — DOCUSATE SODIUM 100 MG/1
100 CAPSULE, LIQUID FILLED ORAL 2 TIMES DAILY PRN
Status: DISCONTINUED | OUTPATIENT
Start: 2020-11-23 | End: 2020-11-25 | Stop reason: HOSPADM

## 2020-11-23 RX ORDER — CALCIUM CARBONATE 500 MG/1
1000 TABLET, CHEWABLE ORAL DAILY
Status: DISCONTINUED | OUTPATIENT
Start: 2020-11-23 | End: 2020-11-25 | Stop reason: HOSPADM

## 2020-11-23 RX ORDER — OXYCODONE HYDROCHLORIDE AND ACETAMINOPHEN 5; 325 MG/1; MG/1
1 TABLET ORAL EVERY 6 HOURS PRN
Status: DISCONTINUED | OUTPATIENT
Start: 2020-11-23 | End: 2020-11-25 | Stop reason: HOSPADM

## 2020-11-23 RX ADMIN — LIDOCAINE HYDROCHLORIDE,EPINEPHRINE BITARTRATE 5 ML: 15; .005 INJECTION, SOLUTION EPIDURAL; INFILTRATION; INTRACAUDAL; PERINEURAL at 13:28

## 2020-11-23 RX ADMIN — SODIUM CHLORIDE, POTASSIUM CHLORIDE, SODIUM LACTATE AND CALCIUM CHLORIDE 1000 ML: 600; 310; 30; 20 INJECTION, SOLUTION INTRAVENOUS at 18:21

## 2020-11-23 RX ADMIN — OXYTOCIN 1 MILLI-UNITS/MIN: 10 INJECTION, SOLUTION INTRAMUSCULAR; INTRAVENOUS at 01:05

## 2020-11-23 RX ADMIN — OXYTOCIN 125 ML/HR: 10 INJECTION, SOLUTION INTRAMUSCULAR; INTRAVENOUS at 21:15

## 2020-11-23 RX ADMIN — SODIUM CHLORIDE, POTASSIUM CHLORIDE, SODIUM LACTATE AND CALCIUM CHLORIDE: 600; 310; 30; 20 INJECTION, SOLUTION INTRAVENOUS at 01:05

## 2020-11-23 RX ADMIN — FENTANYL CITRATE 100 MCG: 50 INJECTION, SOLUTION INTRAMUSCULAR; INTRAVENOUS at 11:32

## 2020-11-23 RX ADMIN — ANTACID TABLETS 1000 MG: 500 TABLET, CHEWABLE ORAL at 01:28

## 2020-11-23 RX ADMIN — IBUPROFEN 600 MG: 600 TABLET, FILM COATED ORAL at 21:36

## 2020-11-23 RX ADMIN — ROPIVACAINE HYDROCHLORIDE 200 MG: 2 INJECTION, SOLUTION EPIDURAL; INFILTRATION; PERINEURAL at 14:00

## 2020-11-23 RX ADMIN — OXYTOCIN 2000 ML/HR: 10 INJECTION, SOLUTION INTRAMUSCULAR; INTRAVENOUS at 19:45

## 2020-11-23 RX ADMIN — ONDANSETRON 4 MG: 2 INJECTION INTRAMUSCULAR; INTRAVENOUS at 18:20

## 2020-11-23 RX ADMIN — ANTACID TABLETS 1000 MG: 500 TABLET, CHEWABLE ORAL at 16:45

## 2020-11-23 RX ADMIN — SODIUM CHLORIDE, POTASSIUM CHLORIDE, SODIUM LACTATE AND CALCIUM CHLORIDE: 600; 310; 30; 20 INJECTION, SOLUTION INTRAVENOUS at 13:00

## 2020-11-23 RX ADMIN — ROPIVACAINE HYDROCHLORIDE 200 MG: 2 INJECTION, SOLUTION EPIDURAL; INFILTRATION at 14:00

## 2020-11-23 RX ADMIN — SODIUM CHLORIDE, POTASSIUM CHLORIDE, SODIUM LACTATE AND CALCIUM CHLORIDE: 600; 310; 30; 20 INJECTION, SOLUTION INTRAVENOUS at 08:52

## 2020-11-23 ASSESSMENT — EDINBURGH POSTNATAL DEPRESSION SCALE (EPDS)
I HAVE BEEN SO UNHAPPY THAT I HAVE HAD DIFFICULTY SLEEPING: NOT AT ALL
I HAVE BEEN ABLE TO LAUGH AND SEE THE FUNNY SIDE OF THINGS: AS MUCH AS I ALWAYS COULD
I HAVE FELT SCARED OR PANICKY FOR NO GOOD REASON: NO, NOT AT ALL
THE THOUGHT OF HARMING MYSELF HAS OCCURRED TO ME: NEVER
THINGS HAVE BEEN GETTING ON TOP OF ME: NO, I HAVE BEEN COPING AS WELL AS EVER
I HAVE FELT SAD OR MISERABLE: NO, NOT AT ALL
I HAVE LOOKED FORWARD WITH ENJOYMENT TO THINGS: AS MUCH AS I EVER DID
I HAVE BLAMED MYSELF UNNECESSARILY WHEN THINGS WENT WRONG: NOT VERY OFTEN
I HAVE BEEN ANXIOUS OR WORRIED FOR NO GOOD REASON: HARDLY EVER
I HAVE BEEN SO UNHAPPY THAT I HAVE BEEN CRYING: NO, NEVER

## 2020-11-23 ASSESSMENT — PAIN DESCRIPTION - PAIN TYPE: TYPE: ACUTE PAIN

## 2020-11-23 NOTE — PROGRESS NOTES
CHALINO  2020   [place] 39w2d    Arrived to L&D for scheduled IOL> admission profile complete, IV started, labs drawn. Dr. Kiran aware pt is 3-/-2. Orders to let patient eat, walk around room. Start pitocin later.     0100- Oxytocin for induction started See MAR. Pt requesting Tums for heartburn   0700- report given to Mahsa BELL.

## 2020-11-23 NOTE — PROGRESS NOTES
OB Progress Note    Uncomfortable with contractions, improved with fentanyl. FHT with baseline of 120's. Moderate variability present. Accelerations present. Decelerations absent. IUPC with contractions every 2 min. SVE 5-6/80/-1. Continue pitocin. Epidural on demand.

## 2020-11-23 NOTE — PROGRESS NOTES
OB Progress Note    Feeling contractions but not uncomfortable. FHT with baseline of 120's. Moderate variability present. Accelerations present. Tocometer with contractions every 2-3 min. SVE 4/70/-2. AROM clear. IUPC placed. Continue pitocin. Epidural on demand. Anticipate vaginal delivery.

## 2020-11-23 NOTE — PROGRESS NOTES
0700- Report received from WOJCIECH Bee RN. Pt is an elective IOL that arrived last night and currently has 6mu of pitocin infusing. Pt would like an epidural as labor becomes more active.  Pt denies LOF or VB. Pt reports +FM.  0730- Dr. Lujan at the bedside, POC discussed to AROM and place IUPC, pt agrees with POC. SVE unchanged, AROM, clr, IUPC placed.  1132- IV fentanyl administered, per pts request. Pt declining epidural at this time.  1145- SVE 5-6/80/-1, Dr. Lujan.  Dr. Lujan aware that IUPC is not tracing MVU's.  1240- LR bolus started for epidural placement. Consents signed.  1300- Dr. Hunt notified that pt is requesting an epidural.  1336- Dr. Hunt at the bedside, timeout complete, epidural placed.  1420- Pt resting with epidural in place, ann placed. SVE unchanged, WESLEY Phillips RN.  1620- Pt requesting to have epidural turned down, pt has a very dense epidural and no tone to her LE's. Epidural rate decreased to 8mL/hr, per Dr. Hunt.  1715- pt has pubic bone pressure, SVE ant lip/+1, WESLEY Phillips, ARABELLA. Dr. Lujan updated on pt's status.  1810- pt feeling pain and pressure, SVE C/+1 to +2, WESLEY Phillips RN. Dr. Lujan updated on labor status, order to labor down.  Epidural rate increased to 10mL/hr for pain.  1850- Report given to WOJCIECH Bee RN.

## 2020-11-24 ENCOUNTER — PHARMACY VISIT (OUTPATIENT)
Dept: PHARMACY | Facility: MEDICAL CENTER | Age: 30
End: 2020-11-24
Payer: COMMERCIAL

## 2020-11-24 PROBLEM — R52 PAIN RELATED TO VAGINAL DELIVERY: Status: ACTIVE | Noted: 2020-11-24

## 2020-11-24 LAB
ACTION RH IMMUNE GLOB 8505RHG: NORMAL
ERYTHROCYTE [DISTWIDTH] IN BLOOD BY AUTOMATED COUNT: 46.3 FL (ref 35.9–50)
HCT VFR BLD AUTO: 40.4 % (ref 37–47)
HGB BLD-MCNC: 13.4 G/DL (ref 12–16)
IMMUNE ROSETTING TEST 8505FMH: NORMAL
MCH RBC QN AUTO: 32 PG (ref 27–33)
MCHC RBC AUTO-ENTMCNC: 33.2 G/DL (ref 33.6–35)
MCV RBC AUTO: 96.4 FL (ref 81.4–97.8)
NUMBER OF RH DOSES IND 8505RD: 1
PLATELET # BLD AUTO: 129 K/UL (ref 164–446)
PMV BLD AUTO: 12.1 FL (ref 9–12.9)
RBC # BLD AUTO: 4.19 M/UL (ref 4.2–5.4)
RH BLD: NORMAL
WBC # BLD AUTO: 14.5 K/UL (ref 4.8–10.8)

## 2020-11-24 PROCEDURE — 770002 HCHG ROOM/CARE - OB PRIVATE (112)

## 2020-11-24 PROCEDURE — 85461 HEMOGLOBIN FETAL: CPT

## 2020-11-24 PROCEDURE — A9270 NON-COVERED ITEM OR SERVICE: HCPCS | Performed by: OBSTETRICS & GYNECOLOGY

## 2020-11-24 PROCEDURE — 86901 BLOOD TYPING SEROLOGIC RH(D): CPT

## 2020-11-24 PROCEDURE — 700102 HCHG RX REV CODE 250 W/ 637 OVERRIDE(OP): Performed by: OBSTETRICS & GYNECOLOGY

## 2020-11-24 PROCEDURE — 85027 COMPLETE CBC AUTOMATED: CPT

## 2020-11-24 PROCEDURE — 36415 COLL VENOUS BLD VENIPUNCTURE: CPT

## 2020-11-24 PROCEDURE — RXMED WILLOW AMBULATORY MEDICATION CHARGE: Performed by: OBSTETRICS & GYNECOLOGY

## 2020-11-24 RX ORDER — IBUPROFEN 600 MG/1
600 TABLET ORAL EVERY 6 HOURS PRN
Qty: 30 TAB | Refills: 0 | Status: SHIPPED | OUTPATIENT
Start: 2020-11-24 | End: 2022-09-06

## 2020-11-24 RX ORDER — OXYCODONE HYDROCHLORIDE AND ACETAMINOPHEN 5; 325 MG/1; MG/1
1 TABLET ORAL EVERY 6 HOURS PRN
Qty: 12 TAB | Refills: 0 | Status: SHIPPED | OUTPATIENT
Start: 2020-11-24 | End: 2020-11-27

## 2020-11-24 RX ORDER — LIDOCAINE HYDROCHLORIDE AND EPINEPHRINE 15; 5 MG/ML; UG/ML
INJECTION, SOLUTION EPIDURAL
Status: DISCONTINUED | OUTPATIENT
Start: 2020-11-23 | End: 2020-11-24 | Stop reason: SURG

## 2020-11-24 RX ADMIN — IBUPROFEN 600 MG: 600 TABLET, FILM COATED ORAL at 17:38

## 2020-11-24 RX ADMIN — IBUPROFEN 600 MG: 600 TABLET, FILM COATED ORAL at 23:43

## 2020-11-24 RX ADMIN — IBUPROFEN 600 MG: 600 TABLET, FILM COATED ORAL at 08:21

## 2020-11-24 RX ADMIN — IBUPROFEN 600 MG: 600 TABLET, FILM COATED ORAL at 03:38

## 2020-11-24 RX ADMIN — PRENATAL WITH FERROUS FUM AND FOLIC ACID 1 TABLET: 3080; 920; 120; 400; 22; 1.84; 3; 20; 10; 1; 12; 200; 27; 25; 2 TABLET ORAL at 08:21

## 2020-11-24 RX ADMIN — ANTACID TABLETS 1000 MG: 500 TABLET, CHEWABLE ORAL at 05:38

## 2020-11-24 ASSESSMENT — PAIN DESCRIPTION - PAIN TYPE
TYPE: ACUTE PAIN

## 2020-11-24 NOTE — ANESTHESIA QCDR
2019 [facility] Clinical Data Registry (for Quality Improvement)     Postoperative nausea/vomiting risk protocol (Adult = 18 yrs and Pediatric 3-17 yrs)- (430 and 463)  General inhalation anesthetic (NOT TIVA) with PONV risk factors: No  Provision of anti-emetic therapy with at least 2 different classes of agents: N/A  Patient DID NOT receive anti-emetic therapy and reason is documented in Medical Record: N/A    Multimodal Pain Management- (477)  Non-emergent surgery AND patient age >= 18: No  Use of Multimodal Pain Management, two or more drugs and/or interventions, NOT including systemic opioids:   Exception: Documented allergy to multiple classes of analgesics:     Smoking Abstinence (404)  Patient is current smoker (cigarette, pipe, e-cig, marijuanna): No  Elective Surgery:   Abstinence instructions provided prior to day of surgery:   Patient abstained from smoking on day of surgery:     Pre-Op Beta-Blocker in Isolated CABG (44)  Isolated CABG AND patient age >= 18: No  Beta-blocker admin within 24 hours of surgical incision:   Exception:of medical reason(s) for not administering beta blocker within 24 hours prior to surgical incision (e.g., not  indicated,other medical reason):     PACU assessment of acute postoperative pain prior to Anesthesia Care End- Applies to Patients Age = 18- (ABG7)  Initial PACU pain score is which of the following: < 7/10  Patient unable to report pain score: N/A    Post-anesthetic transfer of care checklist/protocol to PACU/ICU- (426 and 427)  Upon conclusion of case, patient transferred to which of the following locations: PACU/Non-ICU  Use of transfer checklist/protocol: Yes  Exclusion: Service Performed in Patient Hospital Room (and thus did not require transfer): N/A  Unplanned admission to ICU related to anesthesia service up through end of PACU care- (MD51)  Unplanned admission to ICU (not initially anticipated at anesthesia start time): No

## 2020-11-24 NOTE — PROGRESS NOTES
- Report received from WESLEY fuchs RN. Pt complete in stirups. Ready to Push. Le Petty to bedside.   -  of viable female infant. 8/9 APGARS   - SD of intact placenta

## 2020-11-24 NOTE — DISCHARGE SUMMARY
"Discharge Summary    Admission Date: 2020    Discharge Date: 2020    Diagnosis:Active Problems:     (spontaneous vaginal delivery)    Pain related to vaginal delivery    Subjective: Pain controlled. Normal lochia. Eating, voiding and ambulating without difficulty.Working on breast feeding.    /69   Pulse 85   Temp 36.7 °C (98 °F) (Temporal)   Resp 18   Ht 1.626 m (5' 4\")   Wt 85.7 kg (189 lb)   SpO2 96%   Breastfeeding Yes   BMI 32.44 kg/m²     GEN: NAD  GI:soft, NT, ND  :fundus firm and below umbilicus  EXT:no edema    Hospital Course: Celestine Calderón is a 29 yo  who presented at 39w2d for term elective induction of labor. She is s/p  of a viable female infant with APGARS of 8/9. EBL of 400cc. Bilateral sulcal and labial lacerations repaired. She was meeting all post partum goals and requesting discharge home on PPD#1.     Discharge Instructions   1. Diet : general  2. Activity: Pelvic rest for 6 weeks     Celestine Calderón Jami   Home Medication Instructions JAZIEL:[id number]    Printed on:20 0603   Medication Information                      ibuprofen (MOTRIN) 600 MG Tab  Take 1 Tab by mouth every 6 hours as needed for Moderate Pain.             oxyCODONE-acetaminophen (PERCOCET) 5-325 MG Tab  Take 1 Tab by mouth every 6 hours as needed for up to 3 days.                 Follow up: 6 weeks    Complications:none    Yany Lujan M.D.    "

## 2020-11-24 NOTE — ANESTHESIA PROCEDURE NOTES
Epidural Block    Date/Time: 11/23/2020 1:28 PM  Performed by: Jovani Hunt M.D.  Authorized by: Jovani Hunt M.D.     Patient Location:  OB  Start Time:  11/23/2020 1:28 PM  Reason for Block: labor analgesia    patient identified, IV checked, site marked, risks and benefits discussed, surgical consent, monitors and equipment checked, pre-op evaluation and timeout performed    Patient Position:  Sitting  Prep: ChloraPrep, patient draped and sterile technique    Monitoring:  Blood pressure, continuous pulse oximetry and heart rate  Approach:  Midline  Location:  L3-L4  Injection Technique:  MILDRED saline  Skin infiltration:  Lidocaine  Strength:  1%  Dose:  3ml  Needle Type:  Tuohy  Needle Gauge:  17 G  Needle Length:  3.5 in  Loss of resistance::  9  Catheter Size:  19 G  Catheter at Skin Depth:  15

## 2020-11-24 NOTE — ANESTHESIA TIME REPORT
Anesthesia Start and Stop Event Times     Date Time Event    11/23/2020 1328 Anesthesia Start     1937 Anesthesia Stop        Responsible Staff  11/23/20    Name Role Begin End    Jovani Hunt M.D. Anesth 1328 1937        Preop Diagnosis (Free Text):  Pre-op Diagnosis             Preop Diagnosis (Codes):    Post op Diagnosis  Term pregnancy      Premium Reason  A. 3PM - 7AM    Comments:

## 2020-11-24 NOTE — ANESTHESIA POSTPROCEDURE EVALUATION
Patient: Celestine Calderón    Procedure Summary     Date: 11/23/20 Room / Location:     Anesthesia Start: 1328 Anesthesia Stop: 1937    Procedure: Labor Epidural Diagnosis:     Scheduled Providers:  Responsible Provider: Jovani Hunt M.D.    Anesthesia Type: epidural ASA Status: 2          Final Anesthesia Type: epidural  Last vitals  BP   Blood Pressure: 115/75    Temp   36.6 °C (97.8 °F)    Pulse   Pulse: 97   Resp   18    SpO2   90 %      Anesthesia Post Evaluation    Patient location during evaluation: PACU  Patient participation: complete - patient participated  Level of consciousness: awake and alert    Airway patency: patent  Anesthetic complications: no  Cardiovascular status: hemodynamically stable  Respiratory status: acceptable  Hydration status: euvolemic    PONV: none

## 2020-11-24 NOTE — PROGRESS NOTES
OB Progress Note    Increased pressure and bloody show. FHT with baseline of 120's. Moderate variability present. Accelerations present. Decelerations absent. IUPC with contractions every 1-2 min. SVE AL/C/+1 per Mahsa BELL. Initiate pushing when complete.

## 2020-11-24 NOTE — PROGRESS NOTES
2230 Pt up to bathroom with steady gait. Unable to void, fundus firm, bleeding WNL. Misty care done, pt assisted to w/c.   2245 Pt transferred to postpartum unit in stable condition. Report given to ARABELLA Paredes. Bands and Cuddles verified.

## 2020-11-24 NOTE — PROGRESS NOTES
Patient arrived in wheelchair accompanied by FOB . Report received and bands verified with ARABELLA Villalba. Patient and FOB oriented to unit, room, call light, emergency pull cord, safe sleeping policy, feeding frequency and plan of care. Will continue to monitor patients pain and bleeding per protocol.

## 2020-11-24 NOTE — L&D DELIVERY NOTE
"Delivery Summary    Celestine Calderón is a 29 yo  who presented at 39w2d for term elective induction of labor. She was induced with pitocin. She underwent AROM. She received an epidural for anesthesia. She progressed to complete dilation and with pushing efforts she underwent a spontaneous vaginal delivery of a viable female infant \"Jane\" in JUSTINE position with APGARS 8/9. The head delivered atraumatically. The anterior shoulder delivered with gentle downward pressure and the remainder of the body followed. The infant was placed on the maternal chest. The cord was clamped and cut after a 30 second delay. Pitocin was administered. The placenta was delivered with gentle pressure. The uterus firmed with fundal pressure and pitocin. The perineum was examined and a bilateral labials and bilateral sulcal lacerations were repaired with 3.0 chromic in the usual fashion.    EBL: 400cc  Anesthesia: epidural  Complications: None    Yany Lujan M.D.      "

## 2020-11-24 NOTE — CARE PLAN
Problem: Pain  Goal: Alleviation of Pain or a reduction in pain to the patient's comfort goal  Outcome: PROGRESSING AS EXPECTED  Intervention: Pain Management - Epidural/Spinal  Note: Epidural placed for pain management to meet pt's comfort goal.     Problem: Risk for Infection, Impaired Wound Healing  Goal: Remain free from signs and symptoms of infection  Outcome: PROGRESSING AS EXPECTED  Intervention: Limit vaginal exams as necessary  Note: Vaginal exams limited to reduce risk of infection.

## 2020-11-25 VITALS
SYSTOLIC BLOOD PRESSURE: 115 MMHG | HEIGHT: 64 IN | HEART RATE: 66 BPM | RESPIRATION RATE: 18 BRPM | TEMPERATURE: 97.7 F | DIASTOLIC BLOOD PRESSURE: 79 MMHG | BODY MASS INDEX: 32.27 KG/M2 | WEIGHT: 189 LBS | OXYGEN SATURATION: 96 %

## 2020-11-25 PROCEDURE — 700102 HCHG RX REV CODE 250 W/ 637 OVERRIDE(OP): Performed by: OBSTETRICS & GYNECOLOGY

## 2020-11-25 PROCEDURE — A9270 NON-COVERED ITEM OR SERVICE: HCPCS | Performed by: OBSTETRICS & GYNECOLOGY

## 2020-11-25 RX ADMIN — IBUPROFEN 600 MG: 600 TABLET, FILM COATED ORAL at 05:45

## 2020-11-25 RX ADMIN — IBUPROFEN 600 MG: 600 TABLET, FILM COATED ORAL at 12:51

## 2020-11-25 RX ADMIN — PRENATAL WITH FERROUS FUM AND FOLIC ACID 1 TABLET: 3080; 920; 120; 400; 22; 1.84; 3; 20; 10; 1; 12; 200; 27; 25; 2 TABLET ORAL at 09:16

## 2020-11-25 RX ADMIN — ANTACID TABLETS 1000 MG: 500 TABLET, CHEWABLE ORAL at 05:46

## 2020-11-25 ASSESSMENT — PAIN DESCRIPTION - PAIN TYPE: TYPE: ACUTE PAIN

## 2020-11-25 NOTE — DISCHARGE SUMMARY
DATE OF ADMISSION:  11/22/2020    DATE OF DISCHARGE:  11/25/2020    ADMITTING DIAGNOSIS:  Pregnancy at 39 and 2/7th weeks for induction of labor.    DISCHARGE DIAGNOSES:  1.  Pregnancy at 39 and 2/7th weeks for induction of labor.  2.  Status post normal spontaneous vaginal delivery.    HOSPITAL COURSE IN DETAIL:  This patient was admitted on the aforementioned   date for elective induction with Pitocin.  She was eventually AROM, received   an epidural, progressed over normal labor curve and eventually delivered a   female infant with Apgars of 8 and 9.  The patient and baby recovered in   stable condition.  On postpartum day #1, she was doing well without complaint,   tolerating regular diet with minimal lochia.  Her H and H is stable at 13 and   40.  Today, postpartum day #2, she desires discharge home.    PHYSICAL EXAMINATION:  VITAL SIGNS:  She is afebrile.  Her vital signs are within normal limits.  ABDOMEN:  Soft with full fundus below umbilicus.    ASSESSMENT:  At this time is postpartum day #2, status post normal spontaneous   vaginal delivery, doing well, desires discharge home.    PLAN:  At this time:  1.  Discharge home.  2.  Follow up in 6 weeks.  3.  Pelvic rest.  4.  Lifting precautions.  5.  Scripts for Motrin and Percocet consented and written.       ____________________________________     MD SHANIQUE Worthington / YOBANI    DD:  11/25/2020 05:54:23  DT:  11/25/2020 09:00:16    D#:  [id number]  Job#:  [id number]

## 2020-11-25 NOTE — LACTATION NOTE
followup visit. MOB able to independently latch baby with deep comfortable latch. Reviewed post d/c bfdg resources.

## 2020-11-25 NOTE — PROGRESS NOTES
Received report from ARABELLA Hedrick. Assumed patient care. POC discussed with patient. No additional needs at this time.

## 2020-11-25 NOTE — PROGRESS NOTES
Hospital Day : 2    S: good pain control; min bleed; cramp; bfeed    O:  Vitals:    20 2208 20 2318 20 0544 20 1800   BP: 122/78 115/75 120/69 128/88   Pulse: 98 97 85 100   Resp:  18 18 18   Temp:  36.6 °C (97.8 °F) 36.7 °C (98 °F) 36.7 °C (98 °F)   TempSrc:  Temporal Temporal Temporal   SpO2:  90% 96% 96%   Weight:       Height:           Recent Labs     20  2150 20  0601   WBC 11.6* 14.5*   RBC 4.41 4.19*   HEMOGLOBIN 13.9 13.4   HEMATOCRIT 40.5 40.4   MCV 91.8 96.4   MCH 31.5 32.0   MCHC 34.3 33.2*   RDW 43.3 46.3   PLATELETCT 145* 129*   MPV 12.4 12.1             abd soft ff    A: pp2 sp  doing well    P: dc

## 2020-11-25 NOTE — LACTATION NOTE
followup visit. Baby awake, FOB changed diaper, Baby showing hunger cues. MOB reports baby is tongue thrusting and chomping at breast, and mentions tongue tie. With MOB permission, I did oral exam of baby. Upper lip frenum wraps under gum, upper lip flanges easily. Palate arched, baby begins to suck with chomping/biting motion of jaw, and then settles in to coordinated suck. Lingual cupping strong, lateralizes to left and right. With manual lingual lift, no frenum visualized. FOB taught to use his finger to help soothe baby. Baby then placed on MOB's abdomen, briefly moves, then begins to cry. MOB hand expresses drops of freely flowing colostrum. Baby moved to right breast, and with MOB permission I latched baby, sandwiching breast for a deeper latch. MOB then taught how to hold in cross cradle position and how to support the weight of breast for baby. MOB denies pain with suckling and after 1-2 minutes 1:1 suck:swallows observed. MOB taught how to visualize swallows.  Parents taught importance of skin to skin especially during first 2 weeks when baby is learning how to latch, how to treat engorgement, spoon or finger feeding baby is at any time they are concerned with feedings, and to be sure baby breastfeeds at least 8 times every 24 hours. Discussed cluster feeding and nighttime behaviors.Discussed how to manage engorgement and reviewed post d/c bfdg resources.  MOB encouraged to stay until baby has 1 more feeding so MOB can feel confident independently latching baby prior to d/c.  MOB encouraged to call for latch assist when baby cues again.   Plan:  Skin to skin with parent when parent is awake.  Feed on cue without time restrictions, at minimum 8 times every 24 hours  Expect cluster feeding, especially at nighttime

## 2020-11-25 NOTE — PROGRESS NOTES
0700 - Bedside report received from Lena WEAVER RN. Patient care assumed. Chart, prenatal labs, and orders reviewed  0900 - Pt assessment complete, wnl. Fundus firm with minimal discharge. Pt ambulating to bathroom and voiding without difficulty. Patient denies any dizziness or lightheadedness, any calf pain/tenderness, chest pain or SOB, respiratory symptoms, or any recent ill contacts. Kindly reminded patient and support person to wear face masks when staff is present in room. Plan of care discussed with patient for the day including infant feeding every 2-3 hours or on demand, pain management, and ambulation in halls. Pain medication plan discussed with patient; patient states she will call if PRN pain medication is wanted. All questions/concerns addressed at this time. Call light within reach, encouraged to call with needs. Will continue with routine postpartum cares and proceed with discharge home.

## 2020-11-25 NOTE — PROGRESS NOTES
Discharge instruction for mom and baby discussed. Patient already received her prescriptions. Emphasized the importance of  screening follow-up test. Questions and concerns have been answered. Baby's ID band matches with MOB.

## 2020-11-25 NOTE — DISCHARGE PLANNING
Medication reconcilliation completed. Medications delivered to patient at bedside. Patient counseled.     Celestine Calderón Jami   Home Medication Instructions JAZIEL:[id number]    Printed on:11/24/20 1817   Medication Information                      ibuprofen (MOTRIN) 600 MG Tab  Take 1 Tab by mouth every 6 hours as needed for Moderate Pain.             oxyCODONE-acetaminophen (PERCOCET) 5-325 MG Tab  Take 1 Tab by mouth every 6 hours as needed for up to 3 days.

## 2020-11-25 NOTE — DISCHARGE INSTRUCTIONS
POSTPARTUM DISCHARGE INSTRUCTIONS FOR MOM    YOB: 1990   Age: 30 y.o.               Admit Date: 11/22/2020     Discharge Date: 11/25/2020  Attending Doctor:  Yany Lujan M.D.                  Allergies:  Penicillins    Discharged to home by car. Discharged via wheelchair, hospital escort: Yes.  Special equipment needed: Not Applicable  Belongings with: Personal  Be sure to schedule a follow-up appointment with your primary care doctor or any specialists as instructed.     Discharge Plan:   Diet Plan: Discussed  Activity Level: Discussed  Confirmed Follow up Appointment: Appointment Scheduled  Confirmed Symptoms Management: Discussed  Medication Reconciliation Updated: No (Comments)  Influenza Vaccine Indication: Not indicated: Previously immunized this influenza season and > 8 years of age    REASONS TO CALL YOUR OBSTETRICIAN:  1.   Persistent fever or shaking chills (Temperature higher than 100.4)  2.   Heavy bleeding (soaking more than 1 pad per hour); Passing clots  3.   Foul odor from vagina  4.   Mastitis (Breast infection; breast pain, chills, fever, redness)  5.   Urinary pain, burning or frequencyn  6.   Severe depression longer than 24 hours    HAND WASHING  · Prior to handling the baby.  · Before breastfeeding or bottle feeding baby.  · After using the bathroom or changing the baby's diaper.    VAGINAL CARE  · Nothing inside vagina for 6 weeks: no sexual intercourse, tampons or douching.  · Bleeding may continue for 2-4 weeks.  Amount may vary.    · Call your physician for heavy bleeding which means soaking more than 1 pad per hour    BIRTH CONTROL  · It is possible to become pregnant at any time after delivery and while breastfeeding.  · Plan to discuss a method of birth control with your physician at your follow up visit. visit.    DIET AND ELIMINATION  · Eating more fiber (bran cereal, fruits, and vegetables) and drinking plenty of fluids will help to avoid constipation.  · Urinary  "frequency after childbirth is normal.    POSTPARTUM BLUES  During the first few days after birth, you may experience a sense of the \"blues\" which may include impatience, irritability or even crying.  These feeling come and go quickly.  However, as many as 1 in 10 women experience emotional symptoms known as postpartum depression.    Postpartum depression:  May start as early as the second or third day after delivery or take several weeks or months to develop.  Symptoms of \"blues\" are present, but are more intense:  Crying spells; loss of appetite; feelings of hopelessness or loss of control; fear of touching the baby; over concern or no concern at all about the baby; little or no concern about your own appearance/caring for yourself; and/or inability to sleep or excessive sleeping.  Contact your physician if you are experiencing any of these symptoms.    Crisis Hotline:  · [place] Crisis Hotline:  9-938-LEZTGJF  Or 1-860.949.5390  · [place] Crisis Hotline:  1-540.711.9218  Or 790-767-1614    PREVENTING SHAKEN BABY:  If you are angry or stressed, PUT THE BABY IN THE CRIB, step away, take some deep breaths, and wait until you are calm to care for the baby.  DO NOT SHAKE THE BABY.  You are not alone, call a supporter for help.    · Crisis Call Center 24/7 crisis line 876-160-1872 or 1-873.163.7630  · You can also text them, text \"ANSWER\" to 644979    QUIT SMOKING/TOBACCO USE:  I understand the use of any tobacco products increases my chance of suffering from future heart disease and could cause other illnesses which may shorten my life. Quitting the use of tobacco products is the single most important thing I can do to improve my health. For further information on smoking / tobacco cessation call a Toll Free Quit Line at 1-864.313.9650 (*National [employer] [place]) or 1-465.279.6620 (American Lung Association) or you can access the web based program at www.lungusa.org.    · [place] Tobacco Users Help Line:  (422) " 722-9024       Toll Free: 4-360-861-3856  · Quit Tobacco Program [facility] (061)701-9097    DEPRESSION / SUICIDE RISK:  As you are discharged from this [facility] facility, it is important to learn how to keep safe from harming yourself.    Recognize the warning signs:  · Abrupt changes in personality, positive or negative- including increase in energy   · Giving away possessions  · Change in eating patterns- significant weight changes-  positive or negative  · Change in sleeping patterns- unable to sleep or sleeping all the time   · Unwillingness or inability to communicate  · Depression  · Unusual sadness, discouragement and loneliness  · Talk of wanting to die  · Neglect of personal appearance   · Rebelliousness- reckless behavior  · Withdrawal from people/activities they love  · Confusion- inability to concentrate     If you or a loved one observes any of these behaviors or has concerns about self-harm, here's what you can do:  · Talk about it- your feelings and reasons for harming yourself  · Remove any means that you might use to hurt yourself (examples: pills, rope, extension cords, firearm)  · Get professional help from the community (Mental Health, Substance Abuse, psychological counseling)  · Do not be alone:Call your Safe Contact- someone whom you trust who will be there for you.  · Call your local CRISIS HOTLINE 525-3478 or 848-917-1905  · Call your local Children's Mobile Crisis Response Team [place] (349) 018-1112 or www.[employer]  · Call the toll free National Suicide Prevention Hotlines   · National Suicide Prevention Lifeline 056-870-SMDX (2893)  · National Hope Line Network 800-SUICIDE (739-6724)    DISCHARGE SURVEY:  Thank you for choosing [facility].  We hope we provided you with very good care.  You may be receiving a survey in the mail.  Please fill it out.  Your opinion is valuable to us.    ADDITIONAL EDUCATIONAL MATERIALS GIVEN TO PATIENT: INJOY  education bird        My signature on this form indicates that:  1.  I have reviewed and understand the above information  2.  My questions regarding this information have been answered to my satisfaction.  3.  I have formulated a plan with my discharge nurse to obtain my prescribed medication for home.

## 2022-07-27 ENCOUNTER — TELEPHONE (OUTPATIENT)
Dept: SCHEDULING | Facility: IMAGING CENTER | Age: 32
End: 2022-07-27

## 2022-08-23 SDOH — ECONOMIC STABILITY: HOUSING INSECURITY: IN THE LAST 12 MONTHS, HOW MANY PLACES HAVE YOU LIVED?: 1

## 2022-08-23 SDOH — ECONOMIC STABILITY: INCOME INSECURITY: HOW HARD IS IT FOR YOU TO PAY FOR THE VERY BASICS LIKE FOOD, HOUSING, MEDICAL CARE, AND HEATING?: NOT VERY HARD

## 2022-08-23 SDOH — ECONOMIC STABILITY: INCOME INSECURITY: IN THE LAST 12 MONTHS, WAS THERE A TIME WHEN YOU WERE NOT ABLE TO PAY THE MORTGAGE OR RENT ON TIME?: NO

## 2022-08-23 SDOH — HEALTH STABILITY: MENTAL HEALTH
STRESS IS WHEN SOMEONE FEELS TENSE, NERVOUS, ANXIOUS, OR CAN'T SLEEP AT NIGHT BECAUSE THEIR MIND IS TROUBLED. HOW STRESSED ARE YOU?: NOT AT ALL

## 2022-08-23 SDOH — ECONOMIC STABILITY: HOUSING INSECURITY
IN THE LAST 12 MONTHS, WAS THERE A TIME WHEN YOU DID NOT HAVE A STEADY PLACE TO SLEEP OR SLEPT IN A SHELTER (INCLUDING NOW)?: NO

## 2022-08-23 SDOH — ECONOMIC STABILITY: TRANSPORTATION INSECURITY
IN THE PAST 12 MONTHS, HAS THE LACK OF TRANSPORTATION KEPT YOU FROM MEDICAL APPOINTMENTS OR FROM GETTING MEDICATIONS?: NO

## 2022-08-23 SDOH — ECONOMIC STABILITY: FOOD INSECURITY: WITHIN THE PAST 12 MONTHS, THE FOOD YOU BOUGHT JUST DIDN'T LAST AND YOU DIDN'T HAVE MONEY TO GET MORE.: NEVER TRUE

## 2022-08-23 SDOH — HEALTH STABILITY: PHYSICAL HEALTH: ON AVERAGE, HOW MANY MINUTES DO YOU ENGAGE IN EXERCISE AT THIS LEVEL?: 50 MIN

## 2022-08-23 SDOH — HEALTH STABILITY: PHYSICAL HEALTH: ON AVERAGE, HOW MANY DAYS PER WEEK DO YOU ENGAGE IN MODERATE TO STRENUOUS EXERCISE (LIKE A BRISK WALK)?: 3 DAYS

## 2022-08-23 SDOH — ECONOMIC STABILITY: TRANSPORTATION INSECURITY
IN THE PAST 12 MONTHS, HAS LACK OF RELIABLE TRANSPORTATION KEPT YOU FROM MEDICAL APPOINTMENTS, MEETINGS, WORK OR FROM GETTING THINGS NEEDED FOR DAILY LIVING?: NO

## 2022-08-23 SDOH — ECONOMIC STABILITY: FOOD INSECURITY: WITHIN THE PAST 12 MONTHS, YOU WORRIED THAT YOUR FOOD WOULD RUN OUT BEFORE YOU GOT MONEY TO BUY MORE.: NEVER TRUE

## 2022-08-23 SDOH — ECONOMIC STABILITY: TRANSPORTATION INSECURITY
IN THE PAST 12 MONTHS, HAS LACK OF TRANSPORTATION KEPT YOU FROM MEETINGS, WORK, OR FROM GETTING THINGS NEEDED FOR DAILY LIVING?: NO

## 2022-08-23 ASSESSMENT — LIFESTYLE VARIABLES
HOW MANY STANDARD DRINKS CONTAINING ALCOHOL DO YOU HAVE ON A TYPICAL DAY: 1 OR 2
HOW OFTEN DO YOU HAVE A DRINK CONTAINING ALCOHOL: MONTHLY OR LESS
AUDIT-C TOTAL SCORE: 1
HOW OFTEN DO YOU HAVE SIX OR MORE DRINKS ON ONE OCCASION: NEVER
SKIP TO QUESTIONS 9-10: 1

## 2022-08-23 ASSESSMENT — SOCIAL DETERMINANTS OF HEALTH (SDOH)
HOW OFTEN DO YOU ATTENT MEETINGS OF THE CLUB OR ORGANIZATION YOU BELONG TO?: NEVER
WITHIN THE PAST 12 MONTHS, YOU WORRIED THAT YOUR FOOD WOULD RUN OUT BEFORE YOU GOT THE MONEY TO BUY MORE: NEVER TRUE
HOW OFTEN DO YOU ATTEND CHURCH OR RELIGIOUS SERVICES?: PATIENT DECLINED
HOW OFTEN DO YOU HAVE A DRINK CONTAINING ALCOHOL: MONTHLY OR LESS
IN A TYPICAL WEEK, HOW MANY TIMES DO YOU TALK ON THE PHONE WITH FAMILY, FRIENDS, OR NEIGHBORS?: PATIENT DECLINED
HOW MANY DRINKS CONTAINING ALCOHOL DO YOU HAVE ON A TYPICAL DAY WHEN YOU ARE DRINKING: 1 OR 2
HOW OFTEN DO YOU GET TOGETHER WITH FRIENDS OR RELATIVES?: ONCE A WEEK
IN A TYPICAL WEEK, HOW MANY TIMES DO YOU TALK ON THE PHONE WITH FAMILY, FRIENDS, OR NEIGHBORS?: PATIENT DECLINED
DO YOU BELONG TO ANY CLUBS OR ORGANIZATIONS SUCH AS CHURCH GROUPS UNIONS, FRATERNAL OR ATHLETIC GROUPS, OR SCHOOL GROUPS?: NO
HOW HARD IS IT FOR YOU TO PAY FOR THE VERY BASICS LIKE FOOD, HOUSING, MEDICAL CARE, AND HEATING?: NOT VERY HARD
HOW OFTEN DO YOU ATTENT MEETINGS OF THE CLUB OR ORGANIZATION YOU BELONG TO?: NEVER
HOW OFTEN DO YOU HAVE SIX OR MORE DRINKS ON ONE OCCASION: NEVER
DO YOU BELONG TO ANY CLUBS OR ORGANIZATIONS SUCH AS CHURCH GROUPS UNIONS, FRATERNAL OR ATHLETIC GROUPS, OR SCHOOL GROUPS?: NO
HOW OFTEN DO YOU GET TOGETHER WITH FRIENDS OR RELATIVES?: ONCE A WEEK
HOW OFTEN DO YOU ATTEND CHURCH OR RELIGIOUS SERVICES?: PATIENT DECLINED

## 2022-08-24 ENCOUNTER — OFFICE VISIT (OUTPATIENT)
Dept: MEDICAL GROUP | Facility: PHYSICIAN GROUP | Age: 32
End: 2022-08-24
Payer: COMMERCIAL

## 2022-08-24 VITALS
TEMPERATURE: 98.5 F | OXYGEN SATURATION: 99 % | RESPIRATION RATE: 18 BRPM | HEIGHT: 64 IN | HEART RATE: 94 BPM | SYSTOLIC BLOOD PRESSURE: 116 MMHG | WEIGHT: 168 LBS | DIASTOLIC BLOOD PRESSURE: 70 MMHG | BODY MASS INDEX: 28.68 KG/M2

## 2022-08-24 DIAGNOSIS — L60.9 NAIL ABNORMALITY: ICD-10-CM

## 2022-08-24 PROCEDURE — 99213 OFFICE O/P EST LOW 20 MIN: CPT | Performed by: PHYSICIAN ASSISTANT

## 2022-08-24 RX ORDER — ERYTHROMYCIN 5 MG/G
OINTMENT OPHTHALMIC
COMMUNITY
Start: 2022-06-12 | End: 2022-09-06

## 2022-08-24 ASSESSMENT — PATIENT HEALTH QUESTIONNAIRE - PHQ9: CLINICAL INTERPRETATION OF PHQ2 SCORE: 0

## 2022-08-24 NOTE — PROGRESS NOTES
"Subjective:     CC: nail abnormality     HPI:   Celestine presents today with complaints of her fingernail starting to lift off at her nailbed.  States that it is happening on all of her fingers but somewhat worse than others (both middle and ring fingers are worst). Been going on for about 2 months now and has been improving. Patient did used to have acrylic nails but got them removed about 6 weeks ago.    History reviewed. No pertinent past medical history.    Social History     Tobacco Use    Smoking status: Never    Smokeless tobacco: Never   Vaping Use    Vaping Use: Never used   Substance Use Topics    Alcohol use: Not Currently    Drug use: Never       Current Outpatient Medications Ordered in Epic   Medication Sig Dispense Refill    ibuprofen (MOTRIN) 600 MG Tab Take 1 Tab by mouth every 6 hours as needed for Moderate Pain. 30 Tab 0    erythromycin 5 MG/GM Ointment APPLY OINTMENT BOTH EYES FOUR TIMES DAY FOR 7 DAYS APLLY 1 CM RIBBON ON INNER ASPECT OF EYELID (Patient not taking: Reported on 8/24/2022)       No current [facility]-ordered facility-administered medications on file.       Allergies:  Penicillins    Health Maintenance: Completed    ROS:  Gen: no fevers/chills  Eyes: no changes in vision  ENT: no sore throat  Pulm: no sob, no cough  MSk: no myalgias  Skin: no rash, positive for nail abnormality  Neuro: no headaches    Objective:       Exam:  /70   Pulse 94   Temp 36.9 °C (98.5 °F) (Temporal)   Resp 18   Ht 1.626 m (5' 4\")   Wt 76.2 kg (168 lb)   SpO2 99%   Breastfeeding Yes   BMI 28.84 kg/m²  Body mass index is 28.84 kg/m².    Constitutional: Alert, no distress, well-groomed.  Skin: Warm, dry, good turgor, no rashes in visible areas.  Nails are thin and with uneven surface that is growing out consistent with previous acrylic nail use  Eye: Equal, round and reactive, conjunctiva clear, lids normal.  Neck: Trachea midline, no masses, no thyromegaly.  Respiratory: Unlabored respiratory effort, " no cough.  MSK: Normal gait, moves all extremities.  Neuro: Grossly non-focal.   Psych: Alert and oriented x3, normal affect and mood.    Assessment & Plan:     32 y.o. female with the following -     1. Nail abnormality  Chronic.  Reported history and exam findings consistent with trauma to the nails from previous acrylic nail use.  Recommended continuing night putting acrylic nails on and allowing her nails to grow out.  States that this can take up to 6-12 months usually for the nail to grow out fully.  Can also supplement with hair skin and nail vitamins, but patient is not taking a prenatal vitamin.  Discussed referral to dermatology, however patient would like to hold off as her nails are improving.  Did discuss if she would like referral in future to send me a message and I will put in the system for her.      I spent a total of 25 minutes with record review (including external notes and labs), exam, communication with the patient, communication with other providers, and documentation of this encounter.     Return if symptoms worsen or fail to improve.    Please note that this dictation was created using voice recognition software. I have made every reasonable attempt to correct obvious errors, but I expect that there are errors of grammar and possibly content that I did not discover before finalizing the note.    Electronically signed by Adilene Cho PA-C on August 24, 2022

## 2022-09-05 ENCOUNTER — APPOINTMENT (OUTPATIENT)
Dept: RADIOLOGY | Facility: MEDICAL CENTER | Age: 32
End: 2022-09-05
Attending: EMERGENCY MEDICINE
Payer: COMMERCIAL

## 2022-09-05 ENCOUNTER — HOSPITAL ENCOUNTER (EMERGENCY)
Facility: MEDICAL CENTER | Age: 32
End: 2022-09-05
Attending: EMERGENCY MEDICINE
Payer: COMMERCIAL

## 2022-09-05 VITALS
WEIGHT: 165.34 LBS | SYSTOLIC BLOOD PRESSURE: 118 MMHG | RESPIRATION RATE: 18 BRPM | BODY MASS INDEX: 28.23 KG/M2 | TEMPERATURE: 97.1 F | HEIGHT: 64 IN | HEART RATE: 74 BPM | DIASTOLIC BLOOD PRESSURE: 74 MMHG | OXYGEN SATURATION: 94 %

## 2022-09-05 DIAGNOSIS — M79.10 MYALGIA: ICD-10-CM

## 2022-09-05 DIAGNOSIS — R51.9 NONINTRACTABLE HEADACHE, UNSPECIFIED CHRONICITY PATTERN, UNSPECIFIED HEADACHE TYPE: ICD-10-CM

## 2022-09-05 LAB
ALBUMIN SERPL BCP-MCNC: 4.4 G/DL (ref 3.2–4.9)
ALBUMIN/GLOB SERPL: 1.8 G/DL
ALP SERPL-CCNC: 64 U/L (ref 30–99)
ALT SERPL-CCNC: 10 U/L (ref 2–50)
ANION GAP SERPL CALC-SCNC: 8 MMOL/L (ref 7–16)
APPEARANCE UR: CLEAR
AST SERPL-CCNC: 14 U/L (ref 12–45)
BASOPHILS # BLD AUTO: 0.8 % (ref 0–1.8)
BASOPHILS # BLD: 0.05 K/UL (ref 0–0.12)
BILIRUB SERPL-MCNC: 0.5 MG/DL (ref 0.1–1.5)
BILIRUB UR QL STRIP.AUTO: NEGATIVE
BUN SERPL-MCNC: 11 MG/DL (ref 8–22)
CALCIUM SERPL-MCNC: 8.8 MG/DL (ref 8.4–10.2)
CHLORIDE SERPL-SCNC: 103 MMOL/L (ref 96–112)
CO2 SERPL-SCNC: 23 MMOL/L (ref 20–33)
COLOR UR: YELLOW
CREAT SERPL-MCNC: 0.74 MG/DL (ref 0.5–1.4)
EOSINOPHIL # BLD AUTO: 0.02 K/UL (ref 0–0.51)
EOSINOPHIL NFR BLD: 0.3 % (ref 0–6.9)
ERYTHROCYTE [DISTWIDTH] IN BLOOD BY AUTOMATED COUNT: 39.1 FL (ref 35.9–50)
FLUAV RNA SPEC QL NAA+PROBE: NEGATIVE
FLUBV RNA SPEC QL NAA+PROBE: NEGATIVE
GFR SERPLBLD CREATININE-BSD FMLA CKD-EPI: 110 ML/MIN/1.73 M 2
GLOBULIN SER CALC-MCNC: 2.5 G/DL (ref 1.9–3.5)
GLUCOSE SERPL-MCNC: 107 MG/DL (ref 65–99)
GLUCOSE UR STRIP.AUTO-MCNC: NEGATIVE MG/DL
HCG UR QL: NEGATIVE
HCT VFR BLD AUTO: 43.4 % (ref 37–47)
HGB BLD-MCNC: 14.9 G/DL (ref 12–16)
IMM GRANULOCYTES # BLD AUTO: 0.02 K/UL (ref 0–0.11)
IMM GRANULOCYTES NFR BLD AUTO: 0.3 % (ref 0–0.9)
KETONES UR STRIP.AUTO-MCNC: ABNORMAL MG/DL
LEUKOCYTE ESTERASE UR QL STRIP.AUTO: NEGATIVE
LYMPHOCYTES # BLD AUTO: 1.91 K/UL (ref 1–4.8)
LYMPHOCYTES NFR BLD: 30.6 % (ref 22–41)
MCH RBC QN AUTO: 30.5 PG (ref 27–33)
MCHC RBC AUTO-ENTMCNC: 34.3 G/DL (ref 33.6–35)
MCV RBC AUTO: 88.9 FL (ref 81.4–97.8)
MICRO URNS: ABNORMAL
MONOCYTES # BLD AUTO: 0.69 K/UL (ref 0–0.85)
MONOCYTES NFR BLD AUTO: 11.1 % (ref 0–13.4)
NEUTROPHILS # BLD AUTO: 3.55 K/UL (ref 2–7.15)
NEUTROPHILS NFR BLD: 56.9 % (ref 44–72)
NITRITE UR QL STRIP.AUTO: NEGATIVE
NRBC # BLD AUTO: 0 K/UL
NRBC BLD-RTO: 0 /100 WBC
PH UR STRIP.AUTO: 6 [PH] (ref 5–8)
PLATELET # BLD AUTO: 164 K/UL (ref 164–446)
PMV BLD AUTO: 10.7 FL (ref 9–12.9)
POTASSIUM SERPL-SCNC: 3.8 MMOL/L (ref 3.6–5.5)
PROT SERPL-MCNC: 6.9 G/DL (ref 6–8.2)
PROT UR QL STRIP: NEGATIVE MG/DL
RBC # BLD AUTO: 4.88 M/UL (ref 4.2–5.4)
RBC UR QL AUTO: NEGATIVE
RSV RNA SPEC QL NAA+PROBE: NEGATIVE
SARS-COV-2 RNA RESP QL NAA+PROBE: NOTDETECTED
SODIUM SERPL-SCNC: 134 MMOL/L (ref 135–145)
SP GR UR STRIP.AUTO: 1.02
SPECIMEN SOURCE: NORMAL
TROPONIN T SERPL-MCNC: <6 NG/L (ref 6–19)
WBC # BLD AUTO: 6.2 K/UL (ref 4.8–10.8)

## 2022-09-05 PROCEDURE — 700111 HCHG RX REV CODE 636 W/ 250 OVERRIDE (IP): Performed by: EMERGENCY MEDICINE

## 2022-09-05 PROCEDURE — 70450 CT HEAD/BRAIN W/O DYE: CPT

## 2022-09-05 PROCEDURE — 71046 X-RAY EXAM CHEST 2 VIEWS: CPT

## 2022-09-05 PROCEDURE — 700105 HCHG RX REV CODE 258: Performed by: EMERGENCY MEDICINE

## 2022-09-05 PROCEDURE — 80053 COMPREHEN METABOLIC PANEL: CPT

## 2022-09-05 PROCEDURE — 81025 URINE PREGNANCY TEST: CPT

## 2022-09-05 PROCEDURE — 85025 COMPLETE CBC W/AUTO DIFF WBC: CPT

## 2022-09-05 PROCEDURE — 99284 EMERGENCY DEPT VISIT MOD MDM: CPT

## 2022-09-05 PROCEDURE — 96374 THER/PROPH/DIAG INJ IV PUSH: CPT

## 2022-09-05 PROCEDURE — C9803 HOPD COVID-19 SPEC COLLECT: HCPCS | Performed by: EMERGENCY MEDICINE

## 2022-09-05 PROCEDURE — 0241U HCHG SARS-COV-2 COVID-19 NFCT DS RESP RNA 4 TRGT MIC: CPT

## 2022-09-05 PROCEDURE — 36415 COLL VENOUS BLD VENIPUNCTURE: CPT

## 2022-09-05 PROCEDURE — 81003 URINALYSIS AUTO W/O SCOPE: CPT

## 2022-09-05 PROCEDURE — 84484 ASSAY OF TROPONIN QUANT: CPT

## 2022-09-05 RX ORDER — KETOROLAC TROMETHAMINE 30 MG/ML
30 INJECTION, SOLUTION INTRAMUSCULAR; INTRAVENOUS ONCE
Status: COMPLETED | OUTPATIENT
Start: 2022-09-05 | End: 2022-09-05

## 2022-09-05 RX ORDER — SODIUM CHLORIDE 9 MG/ML
1000 INJECTION, SOLUTION INTRAVENOUS ONCE
Status: COMPLETED | OUTPATIENT
Start: 2022-09-05 | End: 2022-09-05

## 2022-09-05 RX ADMIN — KETOROLAC TROMETHAMINE 30 MG: 30 INJECTION, SOLUTION INTRAMUSCULAR; INTRAVENOUS at 12:39

## 2022-09-05 RX ADMIN — SODIUM CHLORIDE 1000 ML: 9 INJECTION, SOLUTION INTRAVENOUS at 11:49

## 2022-09-05 ASSESSMENT — PAIN DESCRIPTION - PAIN TYPE: TYPE: ACUTE PAIN

## 2022-09-05 NOTE — DISCHARGE INSTRUCTIONS
Continue taking 800 mg of Motrin every 8 hours and 4 hours in between take 2 tablets of Tylenol as needed.

## 2022-09-05 NOTE — ED TRIAGE NOTES
Chief Complaint   Patient presents with    Body Aches    Headache     Tested negative for Covid and flu yesterday at .

## 2022-09-05 NOTE — ED PROVIDER NOTES
ED Provider Note    Medical decision making  Please see Dr. Bautista's dictation for history and physical as well as medical decision making.  The patient was signed out to my care pending CT scan of the head.  CT scan was negative.  The patient does have signs and symptoms consistent with a viral process.  She could have some mild viral meningitis but she does not have any meningitic signs on my exam.  In reviewing her laboratory parameters she does not have a leukocytosis and she has had symptoms for 72 hours and therefore suspect to be very unlikely that she has a bacterial infection.  The patient will be discharged home with instructions for supportive treatment.  She will return if she is acutely worse.

## 2022-09-05 NOTE — ED PROVIDER NOTES
ED Provider Note    CHIEF COMPLAINT  Chief Complaint   Patient presents with    Body Aches    Headache       HPI  Celestine Calderón is a 32 y.o. female who presents with a chief complaint headache.  The headache is frontal.  Is throbbing.  Duration has been about 5 days.  It got better after 2 days and start getting worse.  It is not worse with light.  Associate with diffuse body aches.  With neck discomfort shoulder discomfort body discomfort.  Motrin helps.  Toradol given by the clinic most likely improved.  Notes coming back.    She is very pleasant lady she states that she just had about Thursday she developed headache body aches she thought was the flu or some viral illness.  She treated it with Tylenol Motrin on with Thursday Friday was started to get better and then on Saturday started getting worse.  She is an urgent care clinic.  They did a COVID-negative flu they did a urinalysis pregnancy test all were negative.  She then was told to come back if symptoms worsen they are concerned about diverticulitis that she had some left flank pain.    REVIEW OF SYSTEMS  Fevers described above.  No sore throat.  History of tonsillectomy no ear pain.  No cough or trouble breathing.  See above no diarrhea no constipation.  No dysuria urgency or frequency.    PAST MEDICAL HISTORY  History reviewed. No pertinent past medical history.    FAMILY HISTORY  History reviewed. No pertinent family history.    SOCIAL HISTORY  Social History     Socioeconomic History    Marital status:     Highest education level: 12th grade   Tobacco Use    Smoking status: Never    Smokeless tobacco: Never   Vaping Use    Vaping Use: Never used   Substance and Sexual Activity    Alcohol use: Not Currently    Drug use: Never     Social Determinants of Health     Financial Resource Strain: Low Risk     Difficulty of Paying Living Expenses: Not very hard   Food Insecurity: No Food Insecurity    Worried About Running Out of Food in the Last Year:  "Never true    Ran Out of Food in the Last Year: Never true   Transportation Needs: No Transportation Needs    Lack of Transportation (Medical): No    Lack of Transportation (Non-Medical): No   Physical Activity: Sufficiently Active    Days of Exercise per Week: 3 days    Minutes of Exercise per Session: 50 min   Stress: No Stress Concern Present    Feeling of Stress : Not at all   Social Connections: Unknown    Frequency of Communication with Friends and Family: Patient refused    Frequency of Social Gatherings with Friends and Family: Once a week    Attends [employer] Services: Patient refused    Active Member of Clubs or Organizations: No    Attends Club or Organization Meetings: Never    Marital Status:    Housing Stability: Low Risk     Unable to Pay for Housing in the Last Year: No    Number of Places Lived in the Last Year: 1    Unstable Housing in the Last Year: No       SURGICAL HISTORY  Past Surgical History:   Procedure Laterality Date    TONSILLECTOMY  7/9/08    [facility]    ETHMOIDECTOMY  7/9/08    Performed by [facility] ROB[facility]    ANTROSTOMY  7/9/08    [facility]    TURBINOPLASTY  7/9/08    Performed by NATHAN ROBERTSON at [facility]    OTHER SURGICAL PROCEDURE      benign lymph node removed, right groin, 2005       CURRENT MEDICATIONS  Home Medications    **Home medications have not yet been reviewed for this encounter**         ALLERGIES  Allergies   Allergen Reactions    Penicillins Hives       PHYSICAL EXAM  VITAL SIGNS: /85   Pulse 99   Temp 36.3 °C (97.4 °F) (Temporal)   Resp 16   Ht 1.626 m (5' 4\")   Wt 75 kg (165 lb 5.5 oz)   LMP 08/21/2022 (Approximate)   SpO2 97%   BMI 28.38 kg/m²    Constitutional: Well developed, Well nourished, no acute distress,   HENT: Normocephalic, Atraumatic, Oropharynx moist, No oral " exudates, Nose normal.   Eyes: PERRLA, EOMI, Conjunctiva normal, No discharge.   Musculoskeletal: Neck normal range of motion, No tenderness, Supple,  Cardiovascular: Regular rhythm rate of 90 negative Homans negative cords no pedal edema  Thorax & Lungs: Clear to auscultation bilaterally no wheezes rales or rhonchi  Abdomen: Bowel sounds normal, Soft, No tenderness, No masses, No pulsatile masses.   Skin: Warm, Dry, No erythema, No rash.   : No CVA tenderness.   Neurologic: Alert & oriented , moves all extremities equally  Psychiatric:  Calm, not anxious        RADIOLOGY/PROCEDURES  Results for orders placed or performed during the hospital encounter of 09/05/22   CBC w/ Differential   Result Value Ref Range    WBC 6.2 4.8 - 10.8 K/uL    RBC 4.88 4.20 - 5.40 M/uL    Hemoglobin 14.9 12.0 - 16.0 g/dL    Hematocrit 43.4 37.0 - 47.0 %    MCV 88.9 81.4 - 97.8 fL    MCH 30.5 27.0 - 33.0 pg    MCHC 34.3 33.6 - 35.0 g/dL    RDW 39.1 35.9 - 50.0 fL    Platelet Count 164 164 - 446 K/uL    MPV 10.7 9.0 - 12.9 fL    Neutrophils-Polys 56.90 44.00 - 72.00 %    Lymphocytes 30.60 22.00 - 41.00 %    Monocytes 11.10 0.00 - 13.40 %    Eosinophils 0.30 0.00 - 6.90 %    Basophils 0.80 0.00 - 1.80 %    Immature Granulocytes 0.30 0.00 - 0.90 %    Nucleated RBC 0.00 /100 WBC    Neutrophils (Absolute) 3.55 2.00 - 7.15 K/uL    Lymphs (Absolute) 1.91 1.00 - 4.80 K/uL    Monos (Absolute) 0.69 0.00 - 0.85 K/uL    Eos (Absolute) 0.02 0.00 - 0.51 K/uL    Baso (Absolute) 0.05 0.00 - 0.12 K/uL    Immature Granulocytes (abs) 0.02 0.00 - 0.11 K/uL    NRBC (Absolute) 0.00 K/uL   Complete Metabolic Panel (CMP)   Result Value Ref Range    Sodium 134 (L) 135 - 145 mmol/L    Potassium 3.8 3.6 - 5.5 mmol/L    Chloride 103 96 - 112 mmol/L    Co2 23 20 - 33 mmol/L    Anion Gap 8.0 7.0 - 16.0    Glucose 107 (H) 65 - 99 mg/dL    Bun 11 8 - 22 mg/dL    Creatinine 0.74 0.50 - 1.40 mg/dL    Calcium 8.8 8.4 - 10.2 mg/dL    AST(SGOT) 14 12 - 45 U/L    ALT(SGPT)  10 2 - 50 U/L    Alkaline Phosphatase 64 30 - 99 U/L    Total Bilirubin 0.5 0.1 - 1.5 mg/dL    Albumin 4.4 3.2 - 4.9 g/dL    Total Protein 6.9 6.0 - 8.2 g/dL    Globulin 2.5 1.9 - 3.5 g/dL    A-G Ratio 1.8 g/dL   Troponin STAT   Result Value Ref Range    Troponin T <6 6 - 19 ng/L   URINALYSIS    Specimen: Urine   Result Value Ref Range    Color Yellow     Character Clear     Specific Gravity 1.020 <1.035    Ph 6.0 5.0 - 8.0    Glucose Negative Negative mg/dL    Ketones Trace (A) Negative mg/dL    Protein Negative Negative mg/dL    Bilirubin Negative Negative    Nitrite Negative Negative    Leukocyte Esterase Negative Negative    Occult Blood Negative Negative    Micro Urine Req see below    CoV-2, FLU A/B, and RSV by PCR (2-4 Hours CEPHEID) : Collect NP swab in VTM    Specimen: Respirate   Result Value Ref Range    Influenza virus A RNA Negative Negative    Influenza virus B, PCR Negative Negative    RSV, PCR Negative Negative    SARS-CoV-2 by PCR NotDetected     SARS-CoV-2 Source NP Swab    ESTIMATED GFR   Result Value Ref Range    GFR (CKD-EPI) 110 >60 mL/min/1.73 m 2   BETA-HCG QUALITATIVE URINE   Result Value Ref Range    Beta-Hcg Urine Negative Negative        COURSE & MEDICAL DECISION MAKING  Pertinent Labs & Imaging studies reviewed. (See chart for details)  Fever unknown source and a 30-year-old female this point it still could be COVID flu I find no source at this point.  She has no neck stiffness looks otherwise well.    Plan  Repeat COVID repeat flu  Chest x-ray two-view  Blood work  IV fluids  Toradol.    2:32 PM  The patient was reexamined we told her all the results discussed with her.  The patient had a repeat COVID repeat flu are negative chest x-ray unremarkable white cell count is normal.  And the patient looks otherwise well.  Given her Toradol fluids she is feeling better.  She continues to have a state that she had a headache frontal in the back of her neck.  She has been duration has been for 3  "days.  Despite in her milligrams every 6 it continues.  The Toradol injection took \"took an edge off\" but comes back.  No photophobia no specific neck stiffness.  No vomiting.    At this point I reviewed the chart the patient had some sinusitis in the past she has no sinus tenderness we will go ahead do a CT scan to reveal any fluid is in there concerning sinusitis.  I talked in detail regarding lumbar puncture with her as I do not think this patient has bacterial meningitis.  And therefore, my recommendation would be to avoid number puncture.  I suspicion is that encephalitis/viral meningitis is low at this point and that supportive care would be my treatment and the patient has no significant physical findings such as fever neck stiffness at this time.    At this point the patient will get a CT scan.  Patient verbalized understanding.    FINAL IMPRESSION  1.  Headache  2.  Body aches  3.      Electronically signed by: Surjit Bautista M.D., 9/5/2022 11:38 AM  Chief complaint  "

## 2022-09-06 ENCOUNTER — APPOINTMENT (OUTPATIENT)
Dept: RADIOLOGY | Facility: MEDICAL CENTER | Age: 32
DRG: 098 | End: 2022-09-06
Attending: EMERGENCY MEDICINE
Payer: COMMERCIAL

## 2022-09-06 ENCOUNTER — HOSPITAL ENCOUNTER (EMERGENCY)
Facility: MEDICAL CENTER | Age: 32
End: 2022-09-06
Attending: EMERGENCY MEDICINE
Payer: COMMERCIAL

## 2022-09-06 ENCOUNTER — HOSPITAL ENCOUNTER (INPATIENT)
Facility: MEDICAL CENTER | Age: 32
LOS: 3 days | DRG: 098 | End: 2022-09-09
Attending: EMERGENCY MEDICINE | Admitting: STUDENT IN AN ORGANIZED HEALTH CARE EDUCATION/TRAINING PROGRAM
Payer: COMMERCIAL

## 2022-09-06 VITALS
OXYGEN SATURATION: 96 % | HEIGHT: 64 IN | SYSTOLIC BLOOD PRESSURE: 118 MMHG | TEMPERATURE: 97 F | DIASTOLIC BLOOD PRESSURE: 69 MMHG | BODY MASS INDEX: 28.23 KG/M2 | WEIGHT: 165.34 LBS | RESPIRATION RATE: 16 BRPM | HEART RATE: 80 BPM

## 2022-09-06 DIAGNOSIS — B34.9 ACUTE VIRAL SYNDROME: ICD-10-CM

## 2022-09-06 DIAGNOSIS — R50.9 FEVER, UNSPECIFIED FEVER CAUSE: ICD-10-CM

## 2022-09-06 DIAGNOSIS — A87.9 VIRAL MENINGITIS: ICD-10-CM

## 2022-09-06 DIAGNOSIS — R56.9 SEIZURE-LIKE ACTIVITY (HCC): ICD-10-CM

## 2022-09-06 DIAGNOSIS — R56.9 SEIZURE (HCC): ICD-10-CM

## 2022-09-06 DIAGNOSIS — R51.9 NONINTRACTABLE HEADACHE, UNSPECIFIED CHRONICITY PATTERN, UNSPECIFIED HEADACHE TYPE: ICD-10-CM

## 2022-09-06 LAB
ALBUMIN SERPL BCP-MCNC: 4.4 G/DL (ref 3.2–4.9)
ALBUMIN/GLOB SERPL: 1.8 G/DL
ALP SERPL-CCNC: 66 U/L (ref 30–99)
ALT SERPL-CCNC: 11 U/L (ref 2–50)
AMPHET UR QL SCN: NEGATIVE
ANION GAP SERPL CALC-SCNC: 13 MMOL/L (ref 7–16)
APPEARANCE UR: ABNORMAL
AST SERPL-CCNC: 13 U/L (ref 12–45)
BACTERIA #/AREA URNS HPF: ABNORMAL /HPF
BARBITURATES UR QL SCN: NEGATIVE
BASOPHILS # BLD AUTO: 0.8 % (ref 0–1.8)
BASOPHILS # BLD: 0.04 K/UL (ref 0–0.12)
BENZODIAZ UR QL SCN: POSITIVE
BILIRUB SERPL-MCNC: 0.4 MG/DL (ref 0.1–1.5)
BILIRUB UR QL STRIP.AUTO: NEGATIVE
BUN SERPL-MCNC: 12 MG/DL (ref 8–22)
BURR CELLS/RBC NFR CSF MANUAL: 0 %
BURR CELLS/RBC NFR CSF MANUAL: 0 %
BZE UR QL SCN: NEGATIVE
C GATTII+NEOFOR DNA CSF QL NAA+NON-PROBE: NOT DETECTED
CALCIUM SERPL-MCNC: 9 MG/DL (ref 8.5–10.5)
CANNABINOIDS UR QL SCN: NEGATIVE
CHLORIDE SERPL-SCNC: 105 MMOL/L (ref 96–112)
CLARITY CSF: CLEAR
CLARITY CSF: CLEAR
CMV DNA CSF QL NAA+NON-PROBE: NOT DETECTED
CO2 SERPL-SCNC: 18 MMOL/L (ref 20–33)
COLOR CSF: COLORLESS
COLOR CSF: COLORLESS
COLOR SPUN CSF: COLORLESS
COLOR SPUN CSF: COLORLESS
COLOR UR: YELLOW
CREAT SERPL-MCNC: 0.8 MG/DL (ref 0.5–1.4)
E COLI K1 DNA CSF QL NAA+NON-PROBE: NOT DETECTED
EOSINOPHIL # BLD AUTO: 0.01 K/UL (ref 0–0.51)
EOSINOPHIL NFR BLD: 0.2 % (ref 0–6.9)
EPI CELLS #/AREA URNS HPF: ABNORMAL /HPF
ERYTHROCYTE [DISTWIDTH] IN BLOOD BY AUTOMATED COUNT: 39.9 FL (ref 35.9–50)
EV RNA CSF QL NAA+NON-PROBE: NOT DETECTED
GFR SERPLBLD CREATININE-BSD FMLA CKD-EPI: 100 ML/MIN/1.73 M 2
GLOBULIN SER CALC-MCNC: 2.4 G/DL (ref 1.9–3.5)
GLUCOSE CSF-MCNC: 52 MG/DL (ref 40–80)
GLUCOSE SERPL-MCNC: 114 MG/DL (ref 65–99)
GLUCOSE UR STRIP.AUTO-MCNC: NEGATIVE MG/DL
GP B STREP DNA CSF QL NAA+NON-PROBE: NOT DETECTED
GRAM STN SPEC: NORMAL
HAEM INFLU DNA CSF QL NAA+NON-PROBE: NOT DETECTED
HCT VFR BLD AUTO: 45.3 % (ref 37–47)
HGB BLD-MCNC: 15.5 G/DL (ref 12–16)
HHV6 DNA CSF QL NAA+NON-PROBE: NOT DETECTED
HIV 1+2 AB+HIV1 P24 AG SERPL QL IA: NORMAL
HSV1 DNA CSF QL NAA+NON-PROBE: NOT DETECTED
HSV2 DNA CSF QL NAA+NON-PROBE: NOT DETECTED
IMM GRANULOCYTES # BLD AUTO: 0.03 K/UL (ref 0–0.11)
IMM GRANULOCYTES NFR BLD AUTO: 0.6 % (ref 0–0.9)
KETONES UR STRIP.AUTO-MCNC: 40 MG/DL
L MONOCYTOG DNA CSF QL NAA+NON-PROBE: NOT DETECTED
LACTATE SERPL-SCNC: 1 MMOL/L (ref 0.5–2)
LACTATE SERPL-SCNC: 4.7 MMOL/L (ref 0.5–2)
LEUKOCYTE ESTERASE UR QL STRIP.AUTO: ABNORMAL
LYMPHOCYTES # BLD AUTO: 1.1 K/UL (ref 1–4.8)
LYMPHOCYTES NFR BLD: 20.9 % (ref 22–41)
LYMPHOCYTES NFR CSF: 93 %
MAGNESIUM SERPL-MCNC: 2.1 MG/DL (ref 1.5–2.5)
MCH RBC QN AUTO: 30.6 PG (ref 27–33)
MCHC RBC AUTO-ENTMCNC: 34.2 G/DL (ref 33.6–35)
MCV RBC AUTO: 89.3 FL (ref 81.4–97.8)
METHADONE UR QL SCN: NEGATIVE
MICRO URNS: ABNORMAL
MONOCYTES # BLD AUTO: 0.43 K/UL (ref 0–0.85)
MONOCYTES NFR BLD AUTO: 8.2 % (ref 0–13.4)
MONONUC CELLS NFR CSF: 7 %
MUCOUS THREADS #/AREA URNS HPF: ABNORMAL /HPF
N MEN DNA CSF QL NAA+NON-PROBE: NOT DETECTED
NEUTROPHILS # BLD AUTO: 3.66 K/UL (ref 2–7.15)
NEUTROPHILS NFR BLD: 69.3 % (ref 44–72)
NITRITE UR QL STRIP.AUTO: NEGATIVE
NRBC # BLD AUTO: 0 K/UL
NRBC BLD-RTO: 0 /100 WBC
OPIATES UR QL SCN: POSITIVE
OXYCODONE UR QL SCN: POSITIVE
PARECHOVIRUS A RNA CSF QL NAA+NON-PROBE: NOT DETECTED
PCP UR QL SCN: NEGATIVE
PH UR STRIP.AUTO: 5.5 [PH] (ref 5–8)
PLATELET # BLD AUTO: 167 K/UL (ref 164–446)
PMV BLD AUTO: 10.6 FL (ref 9–12.9)
POTASSIUM SERPL-SCNC: 3.8 MMOL/L (ref 3.6–5.5)
PROPOXYPH UR QL SCN: NEGATIVE
PROT CSF-MCNC: 101 MG/DL (ref 15–45)
PROT SERPL-MCNC: 6.8 G/DL (ref 6–8.2)
PROT UR QL STRIP: NEGATIVE MG/DL
RBC # BLD AUTO: 5.07 M/UL (ref 4.2–5.4)
RBC # CSF: 2 CELLS/UL
RBC # CSF: 3 CELLS/UL
RBC UR QL AUTO: NEGATIVE
S PNEUM DNA CSF QL NAA+NON-PROBE: NOT DETECTED
SIGNIFICANT IND 70042: NORMAL
SITE SITE: NORMAL
SODIUM SERPL-SCNC: 136 MMOL/L (ref 135–145)
SOURCE SOURCE: NORMAL
SP GR UR STRIP.AUTO: 1.02
SPECIMEN VOL CSF: 6 ML
SPECIMEN VOL CSF: 6 ML
T PALLIDUM AB SER QL IA: NORMAL
TUBE # CSF: 1
TUBE # CSF: 4
UROBILINOGEN UR STRIP.AUTO-MCNC: 0.2 MG/DL
VZV DNA CSF QL NAA+NON-PROBE: NOT DETECTED
WBC # BLD AUTO: 5.3 K/UL (ref 4.8–10.8)
WBC # CSF: 272 CELLS/UL (ref 0–10)
WBC # CSF: 343 CELLS/UL (ref 0–10)
WBC #/AREA URNS HPF: ABNORMAL /HPF

## 2022-09-06 PROCEDURE — 700111 HCHG RX REV CODE 636 W/ 250 OVERRIDE (IP): Performed by: EMERGENCY MEDICINE

## 2022-09-06 PROCEDURE — 700111 HCHG RX REV CODE 636 W/ 250 OVERRIDE (IP)

## 2022-09-06 PROCEDURE — 86780 TREPONEMA PALLIDUM: CPT

## 2022-09-06 PROCEDURE — 700105 HCHG RX REV CODE 258: Performed by: EMERGENCY MEDICINE

## 2022-09-06 PROCEDURE — 84157 ASSAY OF PROTEIN OTHER: CPT

## 2022-09-06 PROCEDURE — 99283 EMERGENCY DEPT VISIT LOW MDM: CPT

## 2022-09-06 PROCEDURE — 86788 WEST NILE VIRUS AB IGM: CPT

## 2022-09-06 PROCEDURE — 700102 HCHG RX REV CODE 250 W/ 637 OVERRIDE(OP): Performed by: STUDENT IN AN ORGANIZED HEALTH CARE EDUCATION/TRAINING PROGRAM

## 2022-09-06 PROCEDURE — 36415 COLL VENOUS BLD VENIPUNCTURE: CPT

## 2022-09-06 PROCEDURE — 86789 WEST NILE VIRUS ANTIBODY: CPT

## 2022-09-06 PROCEDURE — 83735 ASSAY OF MAGNESIUM: CPT

## 2022-09-06 PROCEDURE — 96376 TX/PRO/DX INJ SAME DRUG ADON: CPT

## 2022-09-06 PROCEDURE — 96365 THER/PROPH/DIAG IV INF INIT: CPT

## 2022-09-06 PROCEDURE — 009U3ZX DRAINAGE OF SPINAL CANAL, PERCUTANEOUS APPROACH, DIAGNOSTIC: ICD-10-PCS | Performed by: EMERGENCY MEDICINE

## 2022-09-06 PROCEDURE — 770001 HCHG ROOM/CARE - MED/SURG/GYN PRIV*

## 2022-09-06 PROCEDURE — A9270 NON-COVERED ITEM OR SERVICE: HCPCS

## 2022-09-06 PROCEDURE — A9270 NON-COVERED ITEM OR SERVICE: HCPCS | Performed by: STUDENT IN AN ORGANIZED HEALTH CARE EDUCATION/TRAINING PROGRAM

## 2022-09-06 PROCEDURE — 85025 COMPLETE CBC W/AUTO DIFF WBC: CPT

## 2022-09-06 PROCEDURE — 700111 HCHG RX REV CODE 636 W/ 250 OVERRIDE (IP): Performed by: STUDENT IN AN ORGANIZED HEALTH CARE EDUCATION/TRAINING PROGRAM

## 2022-09-06 PROCEDURE — 87086 URINE CULTURE/COLONY COUNT: CPT

## 2022-09-06 PROCEDURE — 87389 HIV-1 AG W/HIV-1&-2 AB AG IA: CPT

## 2022-09-06 PROCEDURE — 82945 GLUCOSE OTHER FLUID: CPT

## 2022-09-06 PROCEDURE — 87040 BLOOD CULTURE FOR BACTERIA: CPT | Mod: 91

## 2022-09-06 PROCEDURE — 87070 CULTURE OTHR SPECIMN AEROBIC: CPT

## 2022-09-06 PROCEDURE — 80053 COMPREHEN METABOLIC PANEL: CPT

## 2022-09-06 PROCEDURE — 87483 CNS DNA AMP PROBE TYPE 12-25: CPT

## 2022-09-06 PROCEDURE — 99221 1ST HOSP IP/OBS SF/LOW 40: CPT | Mod: GC | Performed by: STUDENT IN AN ORGANIZED HEALTH CARE EDUCATION/TRAINING PROGRAM

## 2022-09-06 PROCEDURE — 89051 BODY FLUID CELL COUNT: CPT

## 2022-09-06 PROCEDURE — 80307 DRUG TEST PRSMV CHEM ANLYZR: CPT

## 2022-09-06 PROCEDURE — 96375 TX/PRO/DX INJ NEW DRUG ADDON: CPT

## 2022-09-06 PROCEDURE — 700102 HCHG RX REV CODE 250 W/ 637 OVERRIDE(OP): Performed by: EMERGENCY MEDICINE

## 2022-09-06 PROCEDURE — 83605 ASSAY OF LACTIC ACID: CPT | Mod: 91

## 2022-09-06 PROCEDURE — 71045 X-RAY EXAM CHEST 1 VIEW: CPT

## 2022-09-06 PROCEDURE — 700105 HCHG RX REV CODE 258: Performed by: STUDENT IN AN ORGANIZED HEALTH CARE EDUCATION/TRAINING PROGRAM

## 2022-09-06 PROCEDURE — 700102 HCHG RX REV CODE 250 W/ 637 OVERRIDE(OP)

## 2022-09-06 PROCEDURE — 99285 EMERGENCY DEPT VISIT HI MDM: CPT

## 2022-09-06 PROCEDURE — A9270 NON-COVERED ITEM OR SERVICE: HCPCS | Performed by: EMERGENCY MEDICINE

## 2022-09-06 PROCEDURE — 96366 THER/PROPH/DIAG IV INF ADDON: CPT

## 2022-09-06 PROCEDURE — 87205 SMEAR GRAM STAIN: CPT

## 2022-09-06 PROCEDURE — 62270 DX LMBR SPI PNXR: CPT

## 2022-09-06 PROCEDURE — 81001 URINALYSIS AUTO W/SCOPE: CPT

## 2022-09-06 RX ORDER — PROMETHAZINE HYDROCHLORIDE 25 MG/1
12.5-25 TABLET ORAL EVERY 4 HOURS PRN
Status: DISCONTINUED | OUTPATIENT
Start: 2022-09-06 | End: 2022-09-09 | Stop reason: HOSPADM

## 2022-09-06 RX ORDER — PROCHLORPERAZINE EDISYLATE 5 MG/ML
5-10 INJECTION INTRAMUSCULAR; INTRAVENOUS EVERY 4 HOURS PRN
Status: DISCONTINUED | OUTPATIENT
Start: 2022-09-06 | End: 2022-09-09 | Stop reason: HOSPADM

## 2022-09-06 RX ORDER — LORAZEPAM 2 MG/ML
4 INJECTION INTRAMUSCULAR
Status: DISCONTINUED | OUTPATIENT
Start: 2022-09-06 | End: 2022-09-09 | Stop reason: HOSPADM

## 2022-09-06 RX ORDER — MORPHINE SULFATE 4 MG/ML
4 INJECTION INTRAVENOUS ONCE
Status: COMPLETED | OUTPATIENT
Start: 2022-09-06 | End: 2022-09-06

## 2022-09-06 RX ORDER — AMOXICILLIN 250 MG
2 CAPSULE ORAL 2 TIMES DAILY
Status: DISCONTINUED | OUTPATIENT
Start: 2022-09-06 | End: 2022-09-09 | Stop reason: HOSPADM

## 2022-09-06 RX ORDER — ONDANSETRON 4 MG/1
4 TABLET, ORALLY DISINTEGRATING ORAL EVERY 4 HOURS PRN
Status: DISCONTINUED | OUTPATIENT
Start: 2022-09-06 | End: 2022-09-09 | Stop reason: HOSPADM

## 2022-09-06 RX ORDER — IBUPROFEN 200 MG
400 TABLET ORAL EVERY 6 HOURS PRN
COMMUNITY
End: 2023-07-17

## 2022-09-06 RX ORDER — ENOXAPARIN SODIUM 100 MG/ML
40 INJECTION SUBCUTANEOUS DAILY
Status: DISCONTINUED | OUTPATIENT
Start: 2022-09-06 | End: 2022-09-07

## 2022-09-06 RX ORDER — LEVETIRACETAM 500 MG/5ML
1000 INJECTION, SOLUTION, CONCENTRATE INTRAVENOUS ONCE
Status: COMPLETED | OUTPATIENT
Start: 2022-09-06 | End: 2022-09-06

## 2022-09-06 RX ORDER — SODIUM CHLORIDE 9 MG/ML
INJECTION, SOLUTION INTRAVENOUS CONTINUOUS
Status: DISCONTINUED | OUTPATIENT
Start: 2022-09-06 | End: 2022-09-06

## 2022-09-06 RX ORDER — MIDAZOLAM HYDROCHLORIDE 1 MG/ML
INJECTION INTRAMUSCULAR; INTRAVENOUS
Status: COMPLETED
Start: 2022-09-06 | End: 2022-09-06

## 2022-09-06 RX ORDER — IBUPROFEN 600 MG/1
600 TABLET ORAL ONCE
Status: COMPLETED | OUTPATIENT
Start: 2022-09-06 | End: 2022-09-06

## 2022-09-06 RX ORDER — LEVETIRACETAM 500 MG/5ML
1000 INJECTION, SOLUTION, CONCENTRATE INTRAVENOUS EVERY 12 HOURS
Status: DISCONTINUED | OUTPATIENT
Start: 2022-09-06 | End: 2022-09-07

## 2022-09-06 RX ORDER — MIDAZOLAM HYDROCHLORIDE 1 MG/ML
1 INJECTION INTRAMUSCULAR; INTRAVENOUS ONCE
Status: COMPLETED | OUTPATIENT
Start: 2022-09-06 | End: 2022-09-06

## 2022-09-06 RX ORDER — ACETAMINOPHEN 500 MG
1000 TABLET ORAL
COMMUNITY
End: 2023-07-17

## 2022-09-06 RX ORDER — PROMETHAZINE HYDROCHLORIDE 25 MG/1
12.5-25 SUPPOSITORY RECTAL EVERY 4 HOURS PRN
Status: DISCONTINUED | OUTPATIENT
Start: 2022-09-06 | End: 2022-09-09 | Stop reason: HOSPADM

## 2022-09-06 RX ORDER — CYCLOBENZAPRINE HCL 10 MG
10 TABLET ORAL 3 TIMES DAILY PRN
COMMUNITY
End: 2022-09-11

## 2022-09-06 RX ORDER — POLYETHYLENE GLYCOL 3350 17 G/17G
1 POWDER, FOR SOLUTION ORAL
Status: DISCONTINUED | OUTPATIENT
Start: 2022-09-06 | End: 2022-09-09 | Stop reason: HOSPADM

## 2022-09-06 RX ORDER — IBUPROFEN 800 MG/1
400 TABLET ORAL EVERY 6 HOURS PRN
Status: DISCONTINUED | OUTPATIENT
Start: 2022-09-06 | End: 2022-09-07

## 2022-09-06 RX ORDER — BISACODYL 10 MG
10 SUPPOSITORY, RECTAL RECTAL
Status: DISCONTINUED | OUTPATIENT
Start: 2022-09-06 | End: 2022-09-09 | Stop reason: HOSPADM

## 2022-09-06 RX ORDER — OXYCODONE HYDROCHLORIDE AND ACETAMINOPHEN 5; 325 MG/1; MG/1
1 TABLET ORAL ONCE
Status: COMPLETED | OUTPATIENT
Start: 2022-09-06 | End: 2022-09-06

## 2022-09-06 RX ORDER — ONDANSETRON 4 MG/1
4 TABLET, ORALLY DISINTEGRATING ORAL ONCE
Status: COMPLETED | OUTPATIENT
Start: 2022-09-06 | End: 2022-09-06

## 2022-09-06 RX ORDER — ACETAMINOPHEN 325 MG/1
650 TABLET ORAL EVERY 6 HOURS PRN
Status: DISCONTINUED | OUTPATIENT
Start: 2022-09-06 | End: 2022-09-07

## 2022-09-06 RX ORDER — SODIUM CHLORIDE 9 MG/ML
1000 INJECTION, SOLUTION INTRAVENOUS ONCE
Status: COMPLETED | OUTPATIENT
Start: 2022-09-06 | End: 2022-09-06

## 2022-09-06 RX ORDER — ONDANSETRON 2 MG/ML
4 INJECTION INTRAMUSCULAR; INTRAVENOUS EVERY 4 HOURS PRN
Status: DISCONTINUED | OUTPATIENT
Start: 2022-09-06 | End: 2022-09-09 | Stop reason: HOSPADM

## 2022-09-06 RX ORDER — ONDANSETRON 2 MG/ML
4 INJECTION INTRAMUSCULAR; INTRAVENOUS ONCE
Status: COMPLETED | OUTPATIENT
Start: 2022-09-06 | End: 2022-09-06

## 2022-09-06 RX ADMIN — SODIUM CHLORIDE 1000 ML: 9 INJECTION, SOLUTION INTRAVENOUS at 14:00

## 2022-09-06 RX ADMIN — SODIUM CHLORIDE 1000 ML: 9 INJECTION, SOLUTION INTRAVENOUS at 09:43

## 2022-09-06 RX ADMIN — SENNOSIDES AND DOCUSATE SODIUM 2 TABLET: 50; 8.6 TABLET ORAL at 18:20

## 2022-09-06 RX ADMIN — ONDANSETRON 4 MG: 4 TABLET, ORALLY DISINTEGRATING ORAL at 06:58

## 2022-09-06 RX ADMIN — IBUPROFEN 400 MG: 800 TABLET, FILM COATED ORAL at 16:51

## 2022-09-06 RX ADMIN — ENOXAPARIN SODIUM 40 MG: 40 INJECTION SUBCUTANEOUS at 18:20

## 2022-09-06 RX ADMIN — MIDAZOLAM HYDROCHLORIDE 1 MG: 1 INJECTION, SOLUTION INTRAMUSCULAR; INTRAVENOUS at 10:06

## 2022-09-06 RX ADMIN — ACYCLOVIR SODIUM 628 MG: 50 INJECTION, SOLUTION INTRAVENOUS at 14:00

## 2022-09-06 RX ADMIN — MORPHINE SULFATE 4 MG: 4 INJECTION INTRAVENOUS at 09:10

## 2022-09-06 RX ADMIN — IBUPROFEN 600 MG: 600 TABLET ORAL at 06:57

## 2022-09-06 RX ADMIN — ONDANSETRON 4 MG: 2 INJECTION INTRAMUSCULAR; INTRAVENOUS at 12:29

## 2022-09-06 RX ADMIN — OXYCODONE AND ACETAMINOPHEN 1 TABLET: 5; 325 TABLET ORAL at 06:57

## 2022-09-06 RX ADMIN — LEVETIRACETAM 1000 MG: 100 INJECTION, SOLUTION INTRAVENOUS at 18:19

## 2022-09-06 RX ADMIN — LEVETIRACETAM 1000 MG: 100 INJECTION, SOLUTION INTRAVENOUS at 09:14

## 2022-09-06 RX ADMIN — ONDANSETRON 4 MG: 2 INJECTION INTRAMUSCULAR; INTRAVENOUS at 18:19

## 2022-09-06 RX ADMIN — ONDANSETRON 4 MG: 2 INJECTION INTRAMUSCULAR; INTRAVENOUS at 09:22

## 2022-09-06 RX ADMIN — MIDAZOLAM HYDROCHLORIDE 1 MG: 1 INJECTION INTRAMUSCULAR; INTRAVENOUS at 10:06

## 2022-09-06 RX ADMIN — MIDAZOLAM HYDROCHLORIDE 1 MG: 1 INJECTION, SOLUTION INTRAMUSCULAR; INTRAVENOUS at 10:08

## 2022-09-06 ASSESSMENT — PATIENT HEALTH QUESTIONNAIRE - PHQ9
8. MOVING OR SPEAKING SO SLOWLY THAT OTHER PEOPLE COULD HAVE NOTICED. OR THE OPPOSITE, BEING SO FIGETY OR RESTLESS THAT YOU HAVE BEEN MOVING AROUND A LOT MORE THAN USUAL: NOT AT ALL
2. FEELING DOWN, DEPRESSED, IRRITABLE, OR HOPELESS: SEVERAL DAYS
3. TROUBLE FALLING OR STAYING ASLEEP OR SLEEPING TOO MUCH: NOT AT ALL
9. THOUGHTS THAT YOU WOULD BE BETTER OFF DEAD, OR OF HURTING YOURSELF: NOT AT ALL
7. TROUBLE CONCENTRATING ON THINGS, SUCH AS READING THE NEWSPAPER OR WATCHING TELEVISION: SEVERAL DAYS
4. FEELING TIRED OR HAVING LITTLE ENERGY: SEVERAL DAYS
1. LITTLE INTEREST OR PLEASURE IN DOING THINGS: NOT AT ALL
SUM OF ALL RESPONSES TO PHQ9 QUESTIONS 1 AND 2: 1
5. POOR APPETITE OR OVEREATING: MORE THAN HALF THE DAYS
6. FEELING BAD ABOUT YOURSELF - OR THAT YOU ARE A FAILURE OR HAVE LET YOURSELF OR YOUR FAMILY DOWN: NOT AL ALL
SUM OF ALL RESPONSES TO PHQ QUESTIONS 1-9: 5

## 2022-09-06 ASSESSMENT — LIFESTYLE VARIABLES
HOW MANY TIMES IN THE PAST YEAR HAVE YOU HAD 5 OR MORE DRINKS IN A DAY: 0
HOW MANY TIMES IN THE PAST YEAR HAVE YOU HAD 5 OR MORE DRINKS IN A DAY: 0
TOTAL SCORE: 0
EVER FELT BAD OR GUILTY ABOUT YOUR DRINKING: NO
AVERAGE NUMBER OF DAYS PER WEEK YOU HAVE A DRINK CONTAINING ALCOHOL: 0
TOTAL SCORE: 0
CONSUMPTION TOTAL: INCOMPLETE
HAVE YOU EVER FELT YOU SHOULD CUT DOWN ON YOUR DRINKING: NO
EVER FELT BAD OR GUILTY ABOUT YOUR DRINKING: NO
TOTAL SCORE: 0
AVERAGE NUMBER OF DAYS PER WEEK YOU HAVE A DRINK CONTAINING ALCOHOL: 0
EVER HAD A DRINK FIRST THING IN THE MORNING TO STEADY YOUR NERVES TO GET RID OF A HANGOVER: NO
ON A TYPICAL DAY WHEN YOU DRINK ALCOHOL HOW MANY DRINKS DO YOU HAVE: 0
HAVE PEOPLE ANNOYED YOU BY CRITICIZING YOUR DRINKING: NO
DO YOU DRINK ALCOHOL: NO
CONSUMPTION TOTAL: NEGATIVE
HAVE YOU EVER FELT YOU SHOULD CUT DOWN ON YOUR DRINKING: NO
ALCOHOL_USE: NO
ON A TYPICAL DAY WHEN YOU DRINK ALCOHOL HOW MANY DRINKS DO YOU HAVE: 0
ALCOHOL_USE: NO
EVER HAD A DRINK FIRST THING IN THE MORNING TO STEADY YOUR NERVES TO GET RID OF A HANGOVER: NO

## 2022-09-06 ASSESSMENT — ENCOUNTER SYMPTOMS
FEVER: 1
NAUSEA: 1
SEIZURES: 1
MYALGIAS: 1

## 2022-09-06 ASSESSMENT — COGNITIVE AND FUNCTIONAL STATUS - GENERAL
HELP NEEDED FOR BATHING: A LITTLE
SUGGESTED CMS G CODE MODIFIER DAILY ACTIVITY: CK
PERSONAL GROOMING: A LITTLE
DAILY ACTIVITIY SCORE: 19
STANDING UP FROM CHAIR USING ARMS: A LOT
MOVING TO AND FROM BED TO CHAIR: A LITTLE
CLIMB 3 TO 5 STEPS WITH RAILING: A LITTLE
WALKING IN HOSPITAL ROOM: A LITTLE
DRESSING REGULAR UPPER BODY CLOTHING: A LITTLE
DRESSING REGULAR LOWER BODY CLOTHING: A LITTLE
TURNING FROM BACK TO SIDE WHILE IN FLAT BAD: A LITTLE
SUGGESTED CMS G CODE MODIFIER MOBILITY: CK
MOVING FROM LYING ON BACK TO SITTING ON SIDE OF FLAT BED: A LITTLE
TOILETING: A LITTLE
MOBILITY SCORE: 17

## 2022-09-06 ASSESSMENT — FIBROSIS 4 INDEX
FIB4 SCORE: 0.86
FIB4 SCORE: 0.86

## 2022-09-06 NOTE — PROGRESS NOTES
4 Eyes Skin Assessment Completed by ARABELLA Modi and Tammie RN.    Head WDL  Ears WDL  Nose WDL  Mouth WDL  Neck WDL  Breast/Chest WDL  Shoulder Blades WDL  Spine WDL  (R) Arm/Elbow/Hand WDL  (L) Arm/Elbow/Hand WDL  Abdomen WDL  Groin WDL  Scrotum/Coccyx/Buttocks WDL  (R) Leg WDL  (L) Leg WDL  (R) Heel/Foot/Toe WDL  (L) Heel/Foot/Toe WDL          Devices In Places none      Interventions In Place N/A    Possible Skin Injury No    Pictures Uploaded Into Epic N/A  Wound Consult Placed N/A  RN Wound Prevention Protocol Ordered No

## 2022-09-06 NOTE — NON-PROVIDER
"      [facility] [place] [facility]        PATIENT ID:  NAME:  Celestine Calderón  MRN:               [id number]  YOB: 1990    Date of Admission: 9/6/2022     Attending: Marge Brock M.d.    Resident: Dr. Boy Snyder MD PGY-3, Dr. Vickey Good MD PGY-1    Medical Student: Nancy Vargas, MS3    Primary Care Physician:  Adilene Coh P.A.-C.    CC:  Seizure following 1 week of headache and body ache  HPI: Celestine Calderón is a 32 y.o. female who was brought in by ambulance following a tonic clonic seizure in the car while  was driving patient home after leaving the ED in the early morning. History was obtained by  as patient was obtunded. Patient has had one week history of body aches and headaches. They took multiple visits to Urgent Care and prior to coming to the ED. She was brought by her  to the ED this morning around 6am after the  found her making strrange noises and with a locked jaw and unresponsive. A CT scan, blood labs, and urine analysis were all negative. The patient was given Percocet 1 Tab, Zofran ODT 4 mg, and Motrin 600 mg and felt better afterwards. Patient was in stable condition and discharged. On the drive home, the patient felt nauseous and the  pulled the car over. At this time the patient had another seizure like episode- the  said he had urinary incontinence, stiffening, shaking after the  tried to move her, and foaming of saliva at her mouth.  called EMS and patient remained confused and disoriented.     The patient has had no recent travel, no recent illness, and no symptoms other than body aches and headache for 1 week prior to this visit. The  believes she is up to date on her vaccination but has not received the Covid vaccine. Patient lives at home with  and 2 year old daughter and no one at home is sick. The patient has a \"little cold\" in June and was given erythromycin, ketarolac, and azithromycin to treat. " She has never had a seizure like episode before. She did not report any numbness, weakness, or changes in vision.     ERCourse:  Patient presented with a hypothermia- 96.4 degrees. EMS reported bood sugar in route was 109. Patient was sleepy and intermittently asked and answered questions. Per nurses- she seemed postictal upon arrival. A lumbar puncture was done and returned to show elevated protein, normal glucose, and leukocytosis. She tested negative for COVID and influenza at this time. Urine analysis was negative for infection. She had an elevated lactic acid, elevated lymphocytes, and elevated glucose. A chest x ray showed no abnormalities. Patient was given 4mg/mL morphine injection, keppra to treat seizures, and NS 1,000 mL.       REVIEW OF SYSTEMS:   Ten systems reviewed and were negative except as noted in the HPI.                PAST MEDICAL HISTORY:  OBGYN:  Patient follows up with an OB/GYN and patient's  is unaware of any pertinent history  - daughter is 1 yo  Diagnoses: None  Medications: ibuprofen PRN  Immunizations:  believes patient is up to date except was never vaccinated for covid      PAST SURGICAL HISTORY:  Past Surgical History:   Procedure Laterality Date    TONSILLECTOMY  08    Performed by NATHAN ROBERTSON at SURGERY SAME DAY [facility] ORS    ETHMOIDECTOMY  08    Performed by NATHAN ROBERTSON at SURGERY SAME DAY [facility] ORS    ANTROSTOMY  08    Performed by NATHAN ROBERTSON at SURGERY SAME DAY [facility] ORS    TURBINOPLASTY  08    [facility]    OTHER SURGICAL PROCEDURE      benign lymph node removed, right groin,        FAMILY HISTORY:  Patient's  does know of any family history of neurologic disorders    SOCIAL HISTORY:   Pt lives in [place] with  and 2 year old daughter  Smoking none  Etoh use not currently  Drug use none    DIET:   Orders Placed This Encounter   Procedures     Diet NPO Restrict to: Sips with Medications     Standing Status:   Standing     Number of Occurrences:   1     Order Specific Question:   Diet NPO Restrict to:     Answer:   Sips with Medications [3]       ALLERGIES:  Allergies   Allergen Reactions    Penicillins Hives       OUTPATIENT MEDICATIONS:    Current Facility-Administered Medications:     senna-docusate (PERICOLACE or SENOKOT S) 8.6-50 MG per tablet 2 Tablet, 2 Tablet, Oral, BID **AND** polyethylene glycol/lytes (MIRALAX) PACKET 1 Packet, 1 Packet, Oral, QDAY PRN **AND** magnesium hydroxide (MILK OF MAGNESIA) suspension 30 mL, 30 mL, Oral, QDAY PRN **AND** bisacodyl (DULCOLAX) suppository 10 mg, 10 mg, Rectal, QDAY PRN, Olivier Sheehan M.D.    acetaminophen (Tylenol) tablet 650 mg, 650 mg, Oral, Q6HRS PRN, Olivier Sheehan M.D.    enoxaparin (Lovenox) inj 40 mg, 40 mg, Subcutaneous, DAILY AT 1800, Olivier Sheehan M.D.    ondansetron (ZOFRAN) syringe/vial injection 4 mg, 4 mg, Intravenous, Q4HRS PRN, Olivier Sheehan M.D., 4 mg at 09/06/22 1229    ondansetron (ZOFRAN ODT) dispertab 4 mg, 4 mg, Oral, Q4HRS PRN, Olivier Sheehan M.D.    promethazine (PHENERGAN) tablet 12.5-25 mg, 12.5-25 mg, Oral, Q4HRS PRN, Olivier Sheehan M.D.    promethazine (PHENERGAN) suppository 12.5-25 mg, 12.5-25 mg, Rectal, Q4HRS PRN, Olivier Sheehan M.D.    prochlorperazine (COMPAZINE) injection 5-10 mg, 5-10 mg, Intravenous, Q4HRS PRN, Olivier Sheehan M.D.    LORazepam (ATIVAN) injection 4 mg, 4 mg, Intravenous, Q10 MIN PRN, Marino Snyder D.O.    ibuprofen (MOTRIN) tablet 400 mg, 400 mg, Oral, Q6HRS PRN, Marino Snyder D.O.    acyclovir (ZOVIRAX) 628 mg in  mL IVPB, 10 mg/kg (Adjusted), Intravenous, Q8HRS, Marino Snyder D.O.    NS infusion, , Intravenous, Continuous, Marino Snyder D.O.    Current Outpatient Medications:     acetaminophen (TYLENOL) 500 MG Tab, Take 1,000 mg by mouth one time. Indications: Pain, Disp: , Rfl:     ibuprofen  (MOTRIN) 200 MG Tab, Take 200 mg by mouth every 6 hours as needed. Indications: Pain, Disp: , Rfl:     cyclobenzaprine (FLEXERIL) 10 mg Tab, Take 10 mg by mouth 3 times a day as needed., Disp: , Rfl:     PHYSICAL EXAM:  Vitals:    22 1145 22 1200 22 1215 22 1245   BP: 110/69 108/72 108/63 116/67   Pulse: 79 75 80 78   Resp: 16 16 16 16   Temp:       TempSrc:       SpO2: 100% 100% 99% 99%   Weight:       Height:       , Temp (24hrs), Av.2 °C (97.2 °F), Min:35.8 °C (96.4 °F), Max:36.8 °C (98.2 °F)  , Pulse Oximetry: 99 %, O2 Delivery Device: None - Room Air    General: Pt was somnolent, likely due to postictal state and medications  Skin:  Pink, warm and dry.  No rashes  HEENT: NC/AT. PERRL. EOMI. MMM. No nasal discharge. Oropharynx nonerythematous without exudate/plaques  Neck:  Supple without lymphadenopathy or rigidity. No JVD   Lungs:  Symmetrical.  CTAB with no W/R/R.  Good air movement   Cardiovascular:  S1/S2 RRR without M/R/G.  Abdomen:  Abdomen is soft NT/ND. +BS. No masses noted.  Genitourinary:  Deffered   Extremities:  Full range of motion. No gross deformities noted. 2+ pulses in all extremities. No C/C/E   Spine:  Straight without vertebral anomalies.  CNS:  Muscle tone is normal. Cranial nerves II-XII grossly intact.      LAB TESTS:   Recent Labs     22  1127 22  0845   WBC 6.2 5.3   RBC 4.88 5.07   HEMOGLOBIN 14.9 15.5   HEMATOCRIT 43.4 45.3   MCV 88.9 89.3   MCH 30.5 30.6   RDW 39.1 39.9   PLATELETCT 164 167   MPV 10.7 10.6   NEUTSPOLYS 56.90 69.30   LYMPHOCYTES 30.60 20.90*   MONOCYTES 11.10 8.20   EOSINOPHILS 0.30 0.20   BASOPHILS 0.80 0.80         Recent Labs     22  1127 22  0845   SODIUM 134* 136   POTASSIUM 3.8 3.8   CHLORIDE 103 105   CO2 23 18*   BUN 11 12   CREATININE 0.74 0.80   CALCIUM 8.8 9.0   MAGNESIUM  --  2.1   ALBUMIN 4.4 4.4       CULTURES:   Results       Procedure Component Value Units Date/Time    GRAM STAIN [[id number]]  "Collected: 09/06/22 1023    Order Status: Completed Specimen: CSF Updated: 09/06/22 1238     Significant Indicator .     Source CSF     Site TAP     Gram Stain Result No organisms seen.    CSF CULTURE [[id number]] Collected: 09/06/22 1023    Order Status: Sent Specimen: CSF from Tap Updated: 09/06/22 1238     Significant Indicator NEG     Source CSF     Site TAP     Culture Result -     Gram Stain Result No organisms seen.    BLOOD CULTURE [[id number]] Collected: 09/06/22 0909    Order Status: Sent Specimen: Blood from Peripheral Updated: 09/06/22 1025    Narrative:      Per Hospital Policy: Only change Specimen Src: to \"Line\" if  specified by physician order.    BLOOD CULTURE [[id number]] Collected: 09/06/22 0845    Order Status: Sent Specimen: Blood from Peripheral Updated: 09/06/22 0905    Narrative:      Per Hospital Policy: Only change Specimen Src: to \"Line\" if  specified by physician order.    URINALYSIS [[id number]]     Order Status: Sent Specimen: Urine     URINE CULTURE(NEW) [[id number]]     Order Status: Sent Specimen: Urine             IMAGES:  DX-CHEST-PORTABLE (1 VIEW)   Final Result      No acute cardiopulmonary abnormality.         MR-BRAIN-WITH & W/O    (Results Pending)       CONSULTS:   Consulting ID for empiric treatment of aseptic meningitis    ASSESSMENT/PLAN: 32 y.o. female with no significant PMHx, travel Hx, or sick contacts admitted for generalized tonic-clonic seizure following 1 week of muscle aches and intense headache.    #aseptic meningitis  The patients CSF came back positive for elevated WBCs, protein, normal glucose, and negative culture and gram stain, indicative of aseptic meningitis. We are still waiting for PCR amplification for possible viral etiologies of viral meningitis. The patient had no focal neurologic and cranial nerve defects making suspicion of other neurologic causes such as neoplasms, demyelinating diseases, or lesions, less likely, however not yet ruled out. The seizure " activity is likely attributed to the meningitis. The patient does not meet SIRS criteria at this time.   -MRI ordered  -empiric acyclovir started, reviewed with infectious disease  -NS hydration for supportive care  -monitor for a change in neurologic function or worsening of symptoms    #seizure activity  Patient experienced a tonic clonic seizure, witnessed by  and potentially a seizure earlier this morning, however the  just reports finding her confused and unresponsive, seemingly like a post-ictal state. There is no prior history of seizure activity or neurologic dysfunction. The seizure is likely due to aseptic meningitis, see above.   -MRI to rule out other causes  -keppra for seizure prophylaxis  -monitor for elevated CK and myoglobinuria as a consequence of tonic-clonic seizure    #lactic acidosis  The patient had elevated lactic acids, but this is likely due to seizure activity. If due to seizure activity, expect lactic acid to decrease in a few hours. This is not a major concern at this time and is not causing symptoms.  -monitor lactic acid, trend-down    Disposition: Patient will be admitted for treatment and pending MRI results along with CSF PCR panel      Nancy Vargas, Student  [facility], [place], operated by [facility]

## 2022-09-06 NOTE — ED TRIAGE NOTES
"Chief Complaint   Patient presents with    ALOC     PT with reported 5 min of being unresponsive with no reported Trauma.  PT now tearful and A/O x 4 with a GCS of 15.     Blood Pressure 124/77   Pulse 97   Temperature 36.8 °C (98.2 °F) (Temporal)   Respiration 20   Height 1.626 m (5' 4.02\")   Weight 75 kg (165 lb 5.5 oz)   Last Menstrual Period 08/21/2022 (Approximate)   Oxygen Saturation 95%   Body Mass Index 28.37 kg/m²     "

## 2022-09-06 NOTE — ED PROVIDER NOTES
ED Provider Note    Scribed for Denisha Dowling M.D. by Fausto Garcia. 9/6/2022, 8:38 AM.    Primary care provider: Adilene Cho P.A.-C.  Means of arrival: EMS  History obtained from: Patient  History limited by: None    CHIEF COMPLAINT  Chief Complaint   Patient presents with    Seizure       HPI  Celestine Calderón is a 32 y.o. female who presents to the Emergency Department for evaluation of possible seizure onset prior to arrival. Patient has been feeling unwell for six days.  She reports having headaches and generalized body aches with fevers up to 100 degrees at home.  Headaches have been severe and she has never had headaches like this in her life.  Patient initially evaluated at urgent care 3 days ago and diagnosed with likely viral syndrome.  COVID and influenza were negative at that time.  Urinalysis is negative for infection.  She was then seen in the emergency department yesterday where she had a CT of her head which was unremarkable.  She was advised that this was likely viral meningitis, lumbar puncture was deferred at that time.  Symptoms were managed and she was discharged.  She came in this morning after  found her hunched over, unresponsive, with her eyes shifting.  Patient felt improved after treatment for her headache and she was subsequently discharged with likely viral syndrome.   reports that on the way home from the emergency department patient became nauseated and therefore he pulled over the car.  She had an episode of vomiting followed by rigidity and generalized tonic-clonic motions.  This was in the seat of the car, she did not fall or hit her head.  She did have urinary incontinence and per the  was frothing at her mouth.  He therefore called EMS and she was brought in for further evaluation.  Per EMS her blood sugar in route was 109.  Patient is sleepy but arousable and intermittently asked answering questions.  Per nursing staff she seemed postictal on  "arrival.  Per  she has no prior history of seizures and is otherwise generally healthy.    REVIEW OF SYSTEMS  Review of Systems   Constitutional:  Positive for fever.   Cardiovascular:  Negative for leg swelling.   Gastrointestinal:  Positive for nausea.   Musculoskeletal:  Positive for myalgias.   Neurological:  Positive for seizures.   All other systems reviewed and are negative.    PAST MEDICAL HISTORY  None    SURGICAL HISTORY   has a past surgical history that includes other surgical procedure; tonsillectomy (7/9/08); ethmoidectomy (7/9/08); antrostomy (7/9/08); and turbinoplasty (7/9/08).    SOCIAL HISTORY  Social History     Tobacco Use    Smoking status: Never    Smokeless tobacco: Never   Vaping Use    Vaping Use: Never used   Substance Use Topics    Alcohol use: Not Currently    Drug use: Never      Social History     Substance and Sexual Activity   Drug Use Never       FAMILY HISTORY  No family history pertinent.    CURRENT MEDICATIONS  Home Medications       Reviewed by Denise Zaman (Pharmacy Tech) on 09/06/22 at 1116  Med List Status: Complete     Medication Last Dose Status   acetaminophen (TYLENOL) 500 MG Tab 9/5/2022 Active   cyclobenzaprine (FLEXERIL) 10 mg Tab 9/5/2022 Active   ibuprofen (MOTRIN) 200 MG Tab 9/6/2022 Active                     ALLERGIES  Allergies   Allergen Reactions    Penicillins Hives       PHYSICAL EXAM  VITAL SIGNS: /80   Pulse 83   Temp (!) 35.8 °C (96.4 °F) (Temporal)   Resp 16   Ht 1.626 m (5' 4\")   Wt 75 kg (165 lb 5.5 oz)   LMP 08/21/2022 (Approximate)   SpO2 98%   BMI 28.38 kg/m²   Vitals reviewed by myself.  Nursing note and vitals reviewed.  Constitutional: Well-developed and well-nourished. No distress.   HENT: Head is normocephalic and atraumatic. Oropharynx is clear and moist without exudate or erythema.   Eyes: Pupils are equal, round, and reactive to light. No horizontal or vertical nystagmus. Conjunctiva are normal.   Cardiovascular: " Normal rate and regular rhythm. No murmur heard. Normal radial pulses.  Pulmonary/Chest: Breath sounds normal. No wheezes or rales.   Abdominal: Soft and non-tender. No distention.    Musculoskeletal: Extremities exhibit normal range of motion without edema or tenderness. Patient ambulates with a normal narrow-based steady gait.   Neurological: Sleepy but arousable, alert and oriented to person, place, and time. No focal deficits noted. Cranial nerves II - XII intact. No pronator drift.  No dysmetria on cerebellar testing. Normal speech and language. Normal strength and sensation in bilateral upper and lower extremities.  answers questions appropriately  Skin: Skin is warm and dry. No rash.    Psychiatric: Normal mood and affect. Appropriate for clinical situation.      DIAGNOSTIC STUDIES  LABS  Labs Reviewed   LACTIC ACID - Abnormal; Notable for the following components:       Result Value    Lactic Acid 4.7 (*)     All other components within normal limits   CBC WITH DIFFERENTIAL - Abnormal; Notable for the following components:    Lymphocytes 20.90 (*)     All other components within normal limits   COMP METABOLIC PANEL - Abnormal; Notable for the following components:    Co2 18 (*)     Glucose 114 (*)     All other components within normal limits   CSF PROTEIN - Abnormal; Notable for the following components:    Total Protein,  (*)     All other components within normal limits    Narrative:     Indication for culture:->Evaluation for sepsis without a  clear source of infection   CSF CELL COUNT - Abnormal; Notable for the following components:    Total WBC Count 272 (*)     All other components within normal limits    Narrative:     CSF CELL COUNT FROM TUBE #1   CSF CELL COUNT - Abnormal; Notable for the following components:    Total WBC Count 343 (*)     All other components within normal limits    Narrative:     CSF CELL COUNT FROM TUBE #4   LACTIC ACID    Narrative:     Repeat if initial lactic acid  "result is greater than 2   CSF GLUCOSE    Narrative:     Indication for culture:->Evaluation for sepsis without a  clear source of infection   MENINGITIS/ENCEPHALITIS CSF PANEL BY PCR   ESTIMATED GFR   HIV AG/AB COMBO ASSAY SCREENING   T.PALLIDUM AB EIA   MAGNESIUM   GRAM STAIN   URINALYSIS   URINE CULTURE(NEW)   BLOOD CULTURE    Narrative:     Per Hospital Policy: Only change Specimen Src: to \"Line\" if  specified by physician order.   BLOOD CULTURE    Narrative:     Per Hospital Policy: Only change Specimen Src: to \"Line\" if  specified by physician order.   CSF CULTURE   MISCELLANEOUS TEST   URINE DRUG SCREEN     All labs reviewed by me.    RADIOLOGY  DX-CHEST-PORTABLE (1 VIEW)   Final Result      No acute cardiopulmonary abnormality.         MR-BRAIN-WITH & W/O    (Results Pending)     The radiologist's interpretation of all radiological studies have been reviewed by me.    PROCEDURES    Lumbar Puncture Procedure Note    Indication: Suspected meningitis    Consent: The patient was and spouse was counseled regarding the procedure, it's indications, risks, potential complications and alternatives and any questions were answered. Consent was obtained.    Procedure: The patient was placed in the right lateral decubitus position and the appropriate landmarks were identified. The area was prepped and draped in the usual sterile fashion. Anesthesia was obtained using 2 cc of 1% Lidocaine without epinephrine. A spinal needle was inserted at the L4- L5 level with the stylet in place until spinal fluid was returned, total of 9 cc was collected.  The stylet was then replaced and the needle was withdrawn. A sterile dressing was placed over the site and the patient was placed in the supine position.    The patient tolerated the procedure well.    Complications: None      REASSESSMENT    8:38 AM - Patient seen and examined at bedside. Discussed plan of care, including performing spinal tap. Patient agrees to the plan of care.  " Ordered labs and chest x-ray.    8:56 AM Ordered morphine 4 mg/mL injection to treat patient.     8:59 AM I discussed the patient's case and the above findings with Dr. Morris (Neurologist)     9:05 AM Ordered keppra to treat patient.    9:42 AM Ordered NS infusion 1,000 mL.    HYDRATION: Based on the patient's presentation of Inability to take oral fluids the patient was given IV fluids. IV Hydration was used because oral hydration was not adequate alone. Upon recheck following hydration, the patient was improved.      10:18 AM Performed lumbar puncture procedure as noted above. Patient was given versed to assist with lumbar puncture procedure.    11:01 AM I discussed the patient's case and the above findings with [facility] who agrees to evaluate patient.      COURSE & MEDICAL DECISION MAKING  Nursing notes, VS, PMSFHx reviewed in chart.    Patient is a 32-year-old female who comes in for evaluation of headache and seizure-like activity.  Differential diagnosis includes viral meningitis, bacterial meningitis, encephalitis, viral syndrome, electrolyte derangement.  Diagnostic work-up includes labs.  Patient had a CT last night which was unremarkable and therefore I do not believe this needs to be repeated.  She is neurologically intact on exam.    Patient's initial vitals are within normal limits.  For her symptoms she is treated with morphine, Zofran and IV fluids.  As this is her third ER visit for persistent headache and now having seizure-like activity x2 events I advised patient that we should obtain a lumbar puncture.  Patient and  are amenable to this plan.  Lumbar puncture was obtained, see procedure note above for details.  Patient was having difficulty tolerating lumbar puncture and did require Versed in order to tolerate the procedure.  Labs returned are unremarkable except for lactic acidosis of 4.7, this may be from seizure-like activity.  I discussed the case with neurology given concern for  seizure-like activity and they do recommend initiating Keppra.  Dr. Morris advises that this is likely meningitis, if so continue Keppra and treat underlying etiology.  No need for further neurology consult if infection is the underlying cause.  Discussed the case with [facility] medicine team who has accepted patient for hospitalization, patient be hospitalized for ongoing management.    I did follow-up lumbar puncture results which are consistent with viral meningitis.  White count is elevated with normal glucose and no organisms seen, again consistent with viral meningitis.  Patient is in guarded condition at time of hospitalization.    DISPOSITION:  11:02 AM Spoke with  [facility] regarding the patient.  Patient will be admitted in guarded condition.        FINAL IMPRESSION  1. Viral meningitis    2. Seizure-like activity (HCC)    3. Nonintractable headache, unspecified chronicity pattern, unspecified headache type    4. Fever, unspecified fever cause       Lumbar puncture procedure performed by Fausto GLASER (Scribe), am scribing for, and in the presence of, Denisha Dowling M.D..    Electronically signed by: Fausto Garcia (Navi), 9/6/2022    Denisha HUYNH M.D. personally performed the services described in this documentation, as scribed by Fausto Garcia in my presence, and it is both accurate and complete.     The note accurately reflects work and decisions made by me.  Denisha Dowling M.D.  9/6/2022  1:34 PM

## 2022-09-06 NOTE — H&P
"[facility] MEDICINE HISTORY AND PHYSICAL     PATIENT ID:  NAME:  Celestine Calderón  MRN:               [id number]  YOB: 1990    Date of Admission: 9/6/2022     Attending: Marge Brock MD    Resident: Marino Snyder DO    Primary Care Physician:  Adilene Cho PA-C    CC:  seizure    HPI: Celestine Calderón is a 32 y.o. female with no past medical history who presented with suspected seizure. Patient's  gives most of the history during interview. He notes patient was feeling malaise, body aches, headache for the last 7 days or so. They went to Urgent Care two days ago. Symptoms worsened so they went to ED yesterday. This morning patient was hunched over in bed and \"locked-up\" and so came into the ED. They were discharged home but on the drive home patient felt nauseated and they had to pull the car over so she could vomit.  notes that patient looked \"stiff\" and appeared to have a seizure with shaking of her upper extremities. They called an ambulance and came back to the ED.     No recent fevers, illness, sick contacts, travel, cough, chest pain, diarrhea. No history of seizures or neurologic concerns.  thinks she is up to date on childhood vaccinations but she is not vaccinated against COVID. No new medications. Has allergy to penicillin.     ERCourse:  Recent ED workup with normal vitals, elevated lactic acid to 4.7, negative beta hcg, reassuring CT head, CSF with elevated protein, normal glucose and elevated WBC.    REVIEW OF SYSTEMS:   Ten systems reviewed and were negative except as noted in the HPI.                PAST MEDICAL HISTORY:  No past medical history on file.    PAST SURGICAL HISTORY:  Past Surgical History:   Procedure Laterality Date    TONSILLECTOMY  7/9/08    Performed by NATHAN ROBERTSON at [facility]    ETHMOIDECTOMY  7/9/08    Performed by NATHAN ROBERTSON at [facility]    ANTROSTOMY  7/9/08    Performed by AILYN" NATHAN LOPEZ at [facility]    TURBINOPLASTY  08    Performed by NATHAN ROBERTSON at [facility]    OTHER SURGICAL PROCEDURE      benign lymph node removed, right groin,        FAMILY HISTORY:  No family history on file.    SOCIAL HISTORY:   Pt lives: at home  Tobacco use: none  Etoh use: none  Drug use: none    Works as a hairdresser       ALLERGIES:  Allergies   Allergen Reactions    Penicillins Hives       OUTPATIENT MEDICATIONS:  No current facility-administered medications on file prior to encounter.     Current Outpatient Medications on File Prior to Encounter   Medication Sig Dispense Refill    acetaminophen (TYLENOL) 500 MG Tab Take 1,000 mg by mouth one time. Indications: Pain      ibuprofen (MOTRIN) 200 MG Tab Take 200 mg by mouth every 6 hours as needed. Indications: Pain         PHYSICAL EXAM:  Vitals:    22 0930 22 0945 22 1000 22 1015   BP: 106/68 106/66 108/72 101/66   Pulse: 80 78 74 66   Resp: 16 18 19 16   Temp:       TempSrc:       SpO2: 95% 96% 98% 100%   Weight:       Height:       , Temp (24hrs), Av.1 °C (96.9 °F), Min:35.5 °C (95.9 °F), Max:36.8 °C (98.2 °F)  , Pulse Oximetry: 100 %, O2 Delivery Device: None - Room Air    General: Pt laying in bed, drowsy, slow to answer questions or respond  Skin:  Pink, warm and dry.  No rashes  HEENT: NC/AT. PERRL. EOMI.  Neck:  no rigidity  Lungs:  Symmetrical.  CTAB with no W/R/R.  Cardiovascular:  S1/S2 RRR without M/R/G.  Abdomen:  Abdomen is soft NT/ND. +BS  Extremities: No gross deformities noted. 2+ pulses in all extremities.  CNS:  Muscle tone is normal. Cranial nerves II-XII grossly intact.     LAB TESTS:   Results for orders placed or performed during the hospital encounter of 22   Lactic acid (lactate)   Result Value Ref Range    Lactic Acid 4.7 (HH) 0.5 - 2.0 mmol/L   CBC WITH DIFFERENTIAL   Result Value Ref Range    WBC 5.3 4.8 - 10.8 K/uL    RBC 5.07 4.20 - 5.40  M/uL    Hemoglobin 15.5 12.0 - 16.0 g/dL    Hematocrit 45.3 37.0 - 47.0 %    MCV 89.3 81.4 - 97.8 fL    MCH 30.6 27.0 - 33.0 pg    MCHC 34.2 33.6 - 35.0 g/dL    RDW 39.9 35.9 - 50.0 fL    Platelet Count 167 164 - 446 K/uL    MPV 10.6 9.0 - 12.9 fL    Neutrophils-Polys 69.30 44.00 - 72.00 %    Lymphocytes 20.90 (L) 22.00 - 41.00 %    Monocytes 8.20 0.00 - 13.40 %    Eosinophils 0.20 0.00 - 6.90 %    Basophils 0.80 0.00 - 1.80 %    Immature Granulocytes 0.60 0.00 - 0.90 %    Nucleated RBC 0.00 /100 WBC    Neutrophils (Absolute) 3.66 2.00 - 7.15 K/uL    Lymphs (Absolute) 1.10 1.00 - 4.80 K/uL    Monos (Absolute) 0.43 0.00 - 0.85 K/uL    Eos (Absolute) 0.01 0.00 - 0.51 K/uL    Baso (Absolute) 0.04 0.00 - 0.12 K/uL    Immature Granulocytes (abs) 0.03 0.00 - 0.11 K/uL    NRBC (Absolute) 0.00 K/uL   COMP METABOLIC PANEL   Result Value Ref Range    Sodium 136 135 - 145 mmol/L    Potassium 3.8 3.6 - 5.5 mmol/L    Chloride 105 96 - 112 mmol/L    Co2 18 (L) 20 - 33 mmol/L    Anion Gap 13.0 7.0 - 16.0    Glucose 114 (H) 65 - 99 mg/dL    Bun 12 8 - 22 mg/dL    Creatinine 0.80 0.50 - 1.40 mg/dL    Calcium 9.0 8.5 - 10.5 mg/dL    AST(SGOT) 13 12 - 45 U/L    ALT(SGPT) 11 2 - 50 U/L    Alkaline Phosphatase 66 30 - 99 U/L    Total Bilirubin 0.4 0.1 - 1.5 mg/dL    Albumin 4.4 3.2 - 4.9 g/dL    Total Protein 6.8 6.0 - 8.2 g/dL    Globulin 2.4 1.9 - 3.5 g/dL    A-G Ratio 1.8 g/dL   CSF GLUCOSE   Result Value Ref Range    Glucose CSF 52 40 - 80 mg/dL   CSF PROTEIN   Result Value Ref Range    Total Protein,  (H) 15 - 45 mg/dL   ESTIMATED GFR   Result Value Ref Range    GFR (CKD-EPI) 100 >60 mL/min/1.73 m 2       CULTURES:   Results       Procedure Component Value Units Date/Time    CSF CULTURE [[id number]] Collected: 09/06/22 1023    Order Status: Sent Specimen: CSF from Tap Updated: 09/06/22 1036    BLOOD CULTURE [[id number]] Collected: 09/06/22 0909    Order Status: Sent Specimen: Blood from Peripheral Updated: 09/06/22 1025     "Narrative:      Per Hospital Policy: Only change Specimen Src: to \"Line\" if  specified by physician order.    BLOOD CULTURE [[id number]] Collected: 09/06/22 0845    Order Status: Sent Specimen: Blood from Peripheral Updated: 09/06/22 0905    Narrative:      Per Hospital Policy: Only change Specimen Src: to \"Line\" if  specified by physician order.    URINALYSIS [[id number]]     Order Status: Sent Specimen: Urine     URINE CULTURE(NEW) [[id number]]     Order Status: Sent Specimen: Urine             IMAGES:  DX-CHEST-PORTABLE (1 VIEW)   Final Result      No acute cardiopulmonary abnormality.             CONSULTS:   ID    ASSESSMENT/PLAN: 32 y.o. female admitted for new seizure.    # seizure  Patient without a history of seizure or neurologic concerns. She has had about 1 week history of headache and body aches. It sounds like she had a seizure today with upper extremity shaking. Per EMS blood sugar en route was 109. Etiology most likely meningitis. ED spoke with Neurology who recommended starting Keppra 1000 mg BID. We will admit patient to the floor for further management.   - admit to inpatient  - continue Keppra 1000 mg IV BID, switch to PO once patient more awake, per Neurology recs can likely wean after resolution of infection  - seizure precautions, fall precautions, aspiration precautions, Q4 neuro checks  - MR brain with and without  - check UDS  - lorazepam PRN seizure  - anticipate follow-up in [facility] Epilepsy clinic    # aseptic meningitis  CSF studies suggestive of aseptic meningitis. ID has been consulted. We will start IV acyclovir and await CSF PCR.   - follow-up CSF PCR  - check [place] virus studies  - isolation precautions  - maintenance IVF while on acyclovir  - HIV and RPR collected and normal  - ID recommendations appreciated ----> after discussing with Dr. Hampton, this is not HSV or zoster infection and thus we will stop acyclovir and continue with supportive care    # elevated lactic acid  Likely " secondary to seizure. Did have 1 low temperature, but otherwise SIRS criteria reassuring.     # dispo- inpatient for seizure

## 2022-09-06 NOTE — PROGRESS NOTES
Neurology brief note    Called by ERP for possible seizure event in Ms. Calderón in setting of infectious prodrome, headache.  I requested LP to evaluate for intracranial infection.      On CSF study- she as 343 WBC, 93% lymphocytic predominance- she has meningitis.    Will defer to medicine/ID regarding management/treatment of her meningitis.  Her meningoencephalitis panel is pending.  She is not actively seizing, but given possible seizure event, recommend keppra 1000mg BID prophylaxis.  She likely can wean keppra therapy in the future after resolution of her infection.  May follow up in [facility] Epilepsy clinic as outpatient.  Neurology does not recommend EEG or additional diagnostics at this time.  Please reconsult our service with additional questions or concerns.    Matthew Morris MD  Neurology

## 2022-09-06 NOTE — ED PROVIDER NOTES
"ED Provider Note    Scribed for Alverto Guzman M.D. by Rob Lindsey. 9/6/2022  6:07 AM    Primary care provider: Adilene Cho P.A.-C.  Means of arrival: EMS  History obtained from: Patient  History limited by: None    CHIEF COMPLAINT  Chief Complaint   Patient presents with    ALOC     PT with reported 5 min of being unresponsive with no reported Trauma.  PT now tearful and A/O x 4 with a GCS of 15.       HPI  Celestine Calderón is a 32 y.o. female who presents to the Emergency Department via EMS for episode of unresponsiveness.  states the patient has been experiencing body aches and chills for the last couple days. She was here on the ED yesterday where she had a negative CT scan and given muscle relaxers. This morning around 2-3 hours ago her  was getting ready for work when he observed the patient \"breathing strange with locked jaw\" and tried waking her up but was unsuccessful for around 5 minutes. After waking up she began feeling generally unwell. Upon arrival to ED patient is tearful and states she feels \"awful.\" When asked what symptoms she is feeling she states \"I have a headache and nausea, I don't know...\" No dyspnea.     REVIEW OF SYSTEMS  Pertinent positives include ALOC, body aches, chills, headache.   Pertinent negatives include no dyspnea.    All other systems reviewed and negative. See HPI for further details.       PAST MEDICAL HISTORY       SURGICAL HISTORY   has a past surgical history that includes other surgical procedure; tonsillectomy (7/9/08); ethmoidectomy (7/9/08); antrostomy (7/9/08); and turbinoplasty (7/9/08).    SOCIAL HISTORY  Social History     Tobacco Use    Smoking status: Never    Smokeless tobacco: Never   Vaping Use    Vaping Use: Never used   Substance Use Topics    Alcohol use: Not Currently    Drug use: Never      Social History     Substance and Sexual Activity   Drug Use Never       FAMILY HISTORY  No family history on file.    CURRENT " "MEDICATIONS  Home Medications    **Home medications have not yet been reviewed for this encounter**         ALLERGIES  Allergies   Allergen Reactions    Penicillins Hives       PHYSICAL EXAM  VITAL SIGNS: /77   Pulse 97   Temp 36.8 °C (98.2 °F) (Temporal)   Resp 20   Ht 1.626 m (5' 4.02\")   Wt 75 kg (165 lb 5.5 oz)   LMP 08/21/2022 (Approximate)   SpO2 95%   BMI 28.37 kg/m²     Nursing note and vitals reviewed.  Constitutional: Well-developed and well-nourished. No distress.   HENT: Head is normocephalic and atraumatic. Oropharynx is clear and moist without exudate or erythema.   Eyes: Pupils are equal, round, and reactive to light. Conjunctiva are normal.   Neck: Able to fully range neck with no meningismus.   Cardiovascular: Normal rate and regular rhythm. No murmur heard. Normal radial pulses.  Pulmonary/Chest: Breath sounds normal. No wheezes or rales.   Abdominal: Soft and non-tender. No distention    Musculoskeletal: Extremities exhibit normal range of motion without edema or tenderness.   Neurological: Answers all questions appropriately. Awake, alert and oriented to person, place, and time. No focal deficits noted.  Skin: Skin is warm and dry. No rash.   Psychiatric: Normal mood and affect. Appropriate for clinical situation.      COURSE & MEDICAL DECISION MAKING  Nursing notes, VS, PMSFHx reviewed in chart.     Review of past medical records shows the patient was seen at [facility] yesterday for headache and body aches. Work up including blood labs, urine, and CT were negative and she was thought to have a viral syndrome.     6:07 AM - Patient seen and examined at bedside. We reviewed her extensive work up yesterday which did not demonstrate any \"red flags\" and was consistent with viral syndrome. Patient will be treated with Percocet 1 Tab, Zofran ODT 4 mg, and Motrin 600 mg.  The differential diagnoses include but are not limited to: Viral illness, malaise, psychosomatic symptoms, anxiety    6:50 " AM - Patient feeling improved after medications. She is able to ambulate with steady gait and tolerate PO without issue. Discussed discharge instructions and return precautions with the patient and they were cleared for discharge. Patient was given the opportunity to ask any further questions. Patient is comfortable with discharge at this time.      Patient presents today after an episode of altered level of consciousness.  She had no tongue biting.  No urinary incontinence.  She has complaints that her consistent with a viral syndrome.  Overall malaise, fatigue, headache.  Physical exam is remarkable for no evidence of meningismus.  She has noted very extensive work-up performed yesterday.  This work-up is reassuring.  Your symptoms are all likely related to an acute viral illness.  COVID and influenza were negative.  Recommended over-the-counter medications for symptom control.  Discharged in stable condition.    The patient will return for new or worsening symptoms and is stable at the time of discharge.    The patient is referred to a primary physician for blood pressure management, diabetic screening, and for all other preventative health concerns.    DISPOSITION:  Patient will be discharged home in stable condition.    FOLLOW UP:  Adilene Cho P.A.-C.  [address] [facility] [id number] [phone]    Schedule an appointment as soon as possible for a visit       [facility], Emergency Dept  [address] [facility] [address] [address] [phone]    If symptoms worsen      OUTPATIENT MEDICATIONS:  Discharge Medication List as of 9/6/2022  7:38 AM          FINAL IMPRESSION  1. Acute viral syndrome          Rob HUYNH (Navi), am scribing for, and in the presence of, Alverto Guzman M.D..    Electronically signed by: Rob Parisi), 9/6/2022    Alverto HUYNH M.D. personally performed the services described in this documentation, as scribed by Rob ROMANO  César in my presence, and it is both accurate and complete.    The note accurately reflects work and decisions made by me.  Alverto Guzman M.D.  9/6/2022  8:45 AM

## 2022-09-06 NOTE — ED NOTES
Pt BIB EMS.  Pt seen here this morning after reported Seizure at 4 am. Pt discharged with dx of Viral illness.  Pt was going home with  when  reported pt had another seizure lasting 2 mins.  No h/o seizures.  Pt postictal.  .  ERP to see.

## 2022-09-07 ENCOUNTER — APPOINTMENT (OUTPATIENT)
Dept: RADIOLOGY | Facility: MEDICAL CENTER | Age: 32
DRG: 098 | End: 2022-09-07
Attending: STUDENT IN AN ORGANIZED HEALTH CARE EDUCATION/TRAINING PROGRAM
Payer: COMMERCIAL

## 2022-09-07 LAB
ALBUMIN SERPL BCP-MCNC: 4 G/DL (ref 3.2–4.9)
ALBUMIN/GLOB SERPL: 1.8 G/DL
ALP SERPL-CCNC: 62 U/L (ref 30–99)
ALT SERPL-CCNC: 8 U/L (ref 2–50)
ANION GAP SERPL CALC-SCNC: 12 MMOL/L (ref 7–16)
AST SERPL-CCNC: 13 U/L (ref 12–45)
BASOPHILS # BLD AUTO: 0.7 % (ref 0–1.8)
BASOPHILS # BLD: 0.04 K/UL (ref 0–0.12)
BILIRUB SERPL-MCNC: 0.5 MG/DL (ref 0.1–1.5)
BUN SERPL-MCNC: 11 MG/DL (ref 8–22)
CALCIUM SERPL-MCNC: 8.4 MG/DL (ref 8.5–10.5)
CHLORIDE SERPL-SCNC: 106 MMOL/L (ref 96–112)
CO2 SERPL-SCNC: 18 MMOL/L (ref 20–33)
CREAT SERPL-MCNC: 0.65 MG/DL (ref 0.5–1.4)
EOSINOPHIL # BLD AUTO: 0.02 K/UL (ref 0–0.51)
EOSINOPHIL NFR BLD: 0.3 % (ref 0–6.9)
ERYTHROCYTE [DISTWIDTH] IN BLOOD BY AUTOMATED COUNT: 39.5 FL (ref 35.9–50)
GFR SERPLBLD CREATININE-BSD FMLA CKD-EPI: 120 ML/MIN/1.73 M 2
GLOBULIN SER CALC-MCNC: 2.2 G/DL (ref 1.9–3.5)
GLUCOSE SERPL-MCNC: 77 MG/DL (ref 65–99)
HCT VFR BLD AUTO: 40.1 % (ref 37–47)
HGB BLD-MCNC: 14.1 G/DL (ref 12–16)
IMM GRANULOCYTES # BLD AUTO: 0.01 K/UL (ref 0–0.11)
IMM GRANULOCYTES NFR BLD AUTO: 0.2 % (ref 0–0.9)
LYMPHOCYTES # BLD AUTO: 1.46 K/UL (ref 1–4.8)
LYMPHOCYTES NFR BLD: 24.7 % (ref 22–41)
MCH RBC QN AUTO: 31.1 PG (ref 27–33)
MCHC RBC AUTO-ENTMCNC: 35.2 G/DL (ref 33.6–35)
MCV RBC AUTO: 88.5 FL (ref 81.4–97.8)
MONOCYTES # BLD AUTO: 0.41 K/UL (ref 0–0.85)
MONOCYTES NFR BLD AUTO: 6.9 % (ref 0–13.4)
NEUTROPHILS # BLD AUTO: 3.96 K/UL (ref 2–7.15)
NEUTROPHILS NFR BLD: 67.2 % (ref 44–72)
NRBC # BLD AUTO: 0 K/UL
NRBC BLD-RTO: 0 /100 WBC
PLATELET # BLD AUTO: 160 K/UL (ref 164–446)
PMV BLD AUTO: 10.5 FL (ref 9–12.9)
POTASSIUM SERPL-SCNC: 3.6 MMOL/L (ref 3.6–5.5)
PROT SERPL-MCNC: 6.2 G/DL (ref 6–8.2)
RBC # BLD AUTO: 4.53 M/UL (ref 4.2–5.4)
SODIUM SERPL-SCNC: 136 MMOL/L (ref 135–145)
WBC # BLD AUTO: 5.9 K/UL (ref 4.8–10.8)

## 2022-09-07 PROCEDURE — A9270 NON-COVERED ITEM OR SERVICE: HCPCS | Performed by: STUDENT IN AN ORGANIZED HEALTH CARE EDUCATION/TRAINING PROGRAM

## 2022-09-07 PROCEDURE — A9576 INJ PROHANCE MULTIPACK: HCPCS | Performed by: STUDENT IN AN ORGANIZED HEALTH CARE EDUCATION/TRAINING PROGRAM

## 2022-09-07 PROCEDURE — 700102 HCHG RX REV CODE 250 W/ 637 OVERRIDE(OP): Performed by: STUDENT IN AN ORGANIZED HEALTH CARE EDUCATION/TRAINING PROGRAM

## 2022-09-07 PROCEDURE — 85025 COMPLETE CBC W/AUTO DIFF WBC: CPT

## 2022-09-07 PROCEDURE — 770001 HCHG ROOM/CARE - MED/SURG/GYN PRIV*

## 2022-09-07 PROCEDURE — 80053 COMPREHEN METABOLIC PANEL: CPT

## 2022-09-07 PROCEDURE — 76856 US EXAM PELVIC COMPLETE: CPT

## 2022-09-07 PROCEDURE — 70553 MRI BRAIN STEM W/O & W/DYE: CPT

## 2022-09-07 PROCEDURE — 700111 HCHG RX REV CODE 636 W/ 250 OVERRIDE (IP): Performed by: STUDENT IN AN ORGANIZED HEALTH CARE EDUCATION/TRAINING PROGRAM

## 2022-09-07 PROCEDURE — 99232 SBSQ HOSP IP/OBS MODERATE 35: CPT | Mod: GC | Performed by: STUDENT IN AN ORGANIZED HEALTH CARE EDUCATION/TRAINING PROGRAM

## 2022-09-07 PROCEDURE — 36415 COLL VENOUS BLD VENIPUNCTURE: CPT

## 2022-09-07 PROCEDURE — 700117 HCHG RX CONTRAST REV CODE 255: Performed by: STUDENT IN AN ORGANIZED HEALTH CARE EDUCATION/TRAINING PROGRAM

## 2022-09-07 RX ORDER — IBUPROFEN 800 MG/1
400 TABLET ORAL EVERY 6 HOURS
Status: DISCONTINUED | OUTPATIENT
Start: 2022-09-07 | End: 2022-09-09 | Stop reason: HOSPADM

## 2022-09-07 RX ORDER — ACETAMINOPHEN 325 MG/1
650 TABLET ORAL EVERY 6 HOURS
Status: DISCONTINUED | OUTPATIENT
Start: 2022-09-07 | End: 2022-09-09 | Stop reason: HOSPADM

## 2022-09-07 RX ORDER — LEVETIRACETAM 500 MG/1
1000 TABLET ORAL 2 TIMES DAILY
Status: DISCONTINUED | OUTPATIENT
Start: 2022-09-07 | End: 2022-09-09 | Stop reason: HOSPADM

## 2022-09-07 RX ADMIN — IBUPROFEN 400 MG: 800 TABLET, FILM COATED ORAL at 23:39

## 2022-09-07 RX ADMIN — LEVETIRACETAM 1000 MG: 500 TABLET, FILM COATED ORAL at 18:49

## 2022-09-07 RX ADMIN — RIVAROXABAN 10 MG: 10 TABLET, FILM COATED ORAL at 18:49

## 2022-09-07 RX ADMIN — GADOTERIDOL 15 ML: 279.3 INJECTION, SOLUTION INTRAVENOUS at 11:55

## 2022-09-07 RX ADMIN — ACETAMINOPHEN 650 MG: 325 TABLET, FILM COATED ORAL at 16:00

## 2022-09-07 RX ADMIN — IBUPROFEN 400 MG: 800 TABLET, FILM COATED ORAL at 16:00

## 2022-09-07 RX ADMIN — LEVETIRACETAM 1000 MG: 100 INJECTION, SOLUTION INTRAVENOUS at 04:46

## 2022-09-07 RX ADMIN — ACETAMINOPHEN 650 MG: 325 TABLET, FILM COATED ORAL at 23:38

## 2022-09-07 ASSESSMENT — PATIENT HEALTH QUESTIONNAIRE - PHQ9
5. POOR APPETITE OR OVEREATING: SEVERAL DAYS
SUM OF ALL RESPONSES TO PHQ QUESTIONS 1-9: 4
3. TROUBLE FALLING OR STAYING ASLEEP OR SLEEPING TOO MUCH: NOT AT ALL
4. FEELING TIRED OR HAVING LITTLE ENERGY: SEVERAL DAYS
2. FEELING DOWN, DEPRESSED, IRRITABLE, OR HOPELESS: SEVERAL DAYS
SUM OF ALL RESPONSES TO PHQ9 QUESTIONS 1 AND 2: 1
6. FEELING BAD ABOUT YOURSELF - OR THAT YOU ARE A FAILURE OR HAVE LET YOURSELF OR YOUR FAMILY DOWN: NOT AL ALL
1. LITTLE INTEREST OR PLEASURE IN DOING THINGS: NOT AT ALL
7. TROUBLE CONCENTRATING ON THINGS, SUCH AS READING THE NEWSPAPER OR WATCHING TELEVISION: SEVERAL DAYS
8. MOVING OR SPEAKING SO SLOWLY THAT OTHER PEOPLE COULD HAVE NOTICED. OR THE OPPOSITE, BEING SO FIGETY OR RESTLESS THAT YOU HAVE BEEN MOVING AROUND A LOT MORE THAN USUAL: NOT AT ALL
9. THOUGHTS THAT YOU WOULD BE BETTER OFF DEAD, OR OF HURTING YOURSELF: NOT AT ALL

## 2022-09-07 ASSESSMENT — PAIN DESCRIPTION - PAIN TYPE
TYPE: ACUTE PAIN
TYPE: ACUTE PAIN

## 2022-09-07 NOTE — PROGRESS NOTES
Rounding done. Pt is asleep with regular chest rise and fall. Call light within reach. Bed alarm on.

## 2022-09-07 NOTE — DISCHARGE PLANNING
Current documentation reveals a potential for acute inpatient rehabilitation, please consider a PMR referral to assist with discharge planning.

## 2022-09-07 NOTE — PROGRESS NOTES
Alarm went off, Pt stated she's just turning in the bed. Pt is conversing with this RN. She said she's fine but just a little tired. Needs attended. Call light within reach. Bed alarm on.

## 2022-09-07 NOTE — PROGRESS NOTES
[facility] PROGRESS NOTE        Attending:   Marge Brock M.D,    Resident:   Olivier Sheehan M.D.  Marino Snyder DO    PATIENT:   Celestine Calderón; [id number]; 1990    ID:   32 y.o. female with no past medical history admitted for new onset seizure.     SUBJECTIVE:    No acute events overnight. Patient reports she is feeling better today. Is not in pain. No neck pain, headache, nausea, vomiting, vision changes, weakness, difficulty breathing.     OBJECTIVE:  Vitals:    09/06/22 1600 09/06/22 1632 09/06/22 2008 09/07/22 0410   BP: 122/76 122/78 118/71 131/78   Pulse: 86 84 81 89   Resp: 20 20 19 18   Temp:  37.2 °C (99 °F) 36.9 °C (98.5 °F) 36.3 °C (97.4 °F)   TempSrc:  Temporal Temporal Temporal   SpO2: 100% 100% 97% 99%   Weight:       Height:           Intake/Output Summary (Last 24 hours) at 9/7/2022 0546  Last data filed at 9/6/2022 2000  Gross per 24 hour   Intake 690 ml   Output 200 ml   Net 490 ml       PHYSICAL EXAM:   General: Pt laying in bed, drowsy, slow to answer questions or respond  Skin:  Pink, warm and dry.  No rashes  HEENT: NC/AT. PERRL. EOMI.  Neck:  no rigidity  Lungs:  Symmetrical.  CTAB with no W/R/R.  Cardiovascular:  S1/S2 RRR without M/R/G.  Abdomen:  Abdomen is soft NT/ND. +BS  Extremities: No gross deformities noted. 2+ pulses in all extremities.  CNS:  Muscle tone is normal. Cranial nerves II-XII grossly intact.     LABS:  Recent Labs     09/05/22  1127 09/06/22  0845 09/07/22  0443   WBC 6.2 5.3 5.9   RBC 4.88 5.07 4.53   HEMOGLOBIN 14.9 15.5 14.1   HEMATOCRIT 43.4 45.3 40.1   MCV 88.9 89.3 88.5   MCH 30.5 30.6 31.1   RDW 39.1 39.9 39.5   PLATELETCT 164 167 160*   MPV 10.7 10.6 10.5   NEUTSPOLYS 56.90 69.30 67.20   LYMPHOCYTES 30.60 20.90* 24.70   MONOCYTES 11.10 8.20 6.90   EOSINOPHILS 0.30 0.20 0.30   BASOPHILS 0.80 0.80 0.70     Recent Labs     09/05/22  1127 09/06/22  0845 09/07/22  0443   SODIUM 134* 136 136   POTASSIUM 3.8 3.8 3.6   CHLORIDE 103 105 106   CO2  23 18* 18*   BUN 11 12 11   CREATININE 0.74 0.80 0.65   CALCIUM 8.8 9.0 8.4*   MAGNESIUM  --  2.1  --    ALBUMIN 4.4 4.4 4.0     Estimated GFR/CRCL = Estimated Creatinine Clearance: 123.2 mL/min (by C-G formula based on SCr of 0.65 mg/dL).  Recent Labs     09/05/22  1127 09/06/22  0845 09/07/22  0443   GLUCOSE 107* 114* 77     Recent Labs     09/05/22  1127 09/06/22  0845 09/07/22  0443   ASTSGOT 14 13 13   ALTSGPT 10 11 8   TBILIRUBIN 0.5 0.4 0.5   ALKPHOSPHAT 64 66 62   GLOBULIN 2.5 2.4 2.2           IMAGING:  DX-CHEST-PORTABLE (1 VIEW)   Final Result      No acute cardiopulmonary abnormality.         MR-BRAIN-WITH & W/O    (Results Pending)       MEDS:  Current Facility-Administered Medications   Medication Last Admin    senna-docusate (PERICOLACE or SENOKOT S) 8.6-50 MG per tablet 2 Tablet 2 Tablet at 09/06/22 1820    And    polyethylene glycol/lytes (MIRALAX) PACKET 1 Packet      And    magnesium hydroxide (MILK OF MAGNESIA) suspension 30 mL      And    bisacodyl (DULCOLAX) suppository 10 mg      acetaminophen (Tylenol) tablet 650 mg      enoxaparin (Lovenox) inj 40 mg 40 mg at 09/06/22 1820    ondansetron (ZOFRAN) syringe/vial injection 4 mg 4 mg at 09/06/22 1819    ondansetron (ZOFRAN ODT) dispertab 4 mg      promethazine (PHENERGAN) tablet 12.5-25 mg      promethazine (PHENERGAN) suppository 12.5-25 mg      prochlorperazine (COMPAZINE) injection 5-10 mg      LORazepam (ATIVAN) injection 4 mg      ibuprofen (MOTRIN) tablet 400 mg 400 mg at 09/06/22 1651    levETIRAcetam (Keppra) injection 1,000 mg 1,000 mg at 09/07/22 0446       ASSESSMENT/PLAN:  # seizure  Patient without a history of seizure or neurologic concerns. She has had about 1 week history of headache and body aches. Had one witnessed episode afternoon 9/6 of upper extremity shaking, eye rolling, prolonged confusion witnessed 9/6. Possibly one other episode the morning of 9/6 that was unwitnessed. Per EMS blood sugar en route was 109. Etiology most  likely meningitis. ED spoke with Neurology who recommended starting Keppra 1000 mg BID. UDS with benzo, opiate, oxycodone positive -- all received by medical providers.   - continue Keppra 1000 mg IV BID, switch to PO once patient more awake, per Neurology recs can likely wean after resolution of infection  - seizure precautions, fall precautions, aspiration precautions, Q4 neuro checks  - MR brain with and without f/u   - lorazepam PRN seizure  - anticipate follow-up in [facility] Epilepsy clinic     # aseptic meningitis  CSF studies suggestive of aseptic meningitis. ID has been consulted. Started 9/6 IV acyclovir. CSF , Glucose 52, Protein 101. CSF meninigitis panel negative including HSV and VSV. Valacyclovir d/c'd 9/6 at ID recommendation. HIV and RPR collected and normal  - check [place] virus studies  - isolation precautions  - ID recommendations appreciated      # elevated lactic acid -- resolved  Likely secondary to seizure. Did have 1 low temperature, but otherwise SIRS criteria reassuring.   -LA downtrend to 1.0 on 9/6     # dispo- inpatient for seizure

## 2022-09-07 NOTE — CARE PLAN
The patient is Stable - Low risk of patient condition declining or worsening    Shift Goals  Clinical Goals: prevent falls and able to go back to baseline  Patient Goals: ADDY  Family Goals: able to go back to baseline    Progress made toward(s) clinical / shift goals:  Pt is calm and cooperative. Pt is asleep with regular chest rise and fall. Placed on bed alarm.     Patient is not progressing towards the following goals:

## 2022-09-07 NOTE — PROGRESS NOTES
Received on her room awake, pt only respond to Yes and No questions. Family @ bedside. Updates given. Seizures precaution in placed. Placed on bed alarm.

## 2022-09-08 LAB
BACTERIA UR CULT: NORMAL
SIGNIFICANT IND 70042: NORMAL
SITE SITE: NORMAL
SOURCE SOURCE: NORMAL
WNV IGG CSF IA-ACNC: 0.14 IV
WNV IGM CSF IA-ACNC: 0.07 IV

## 2022-09-08 PROCEDURE — A9270 NON-COVERED ITEM OR SERVICE: HCPCS | Performed by: STUDENT IN AN ORGANIZED HEALTH CARE EDUCATION/TRAINING PROGRAM

## 2022-09-08 PROCEDURE — A9270 NON-COVERED ITEM OR SERVICE: HCPCS

## 2022-09-08 PROCEDURE — 700102 HCHG RX REV CODE 250 W/ 637 OVERRIDE(OP)

## 2022-09-08 PROCEDURE — 99232 SBSQ HOSP IP/OBS MODERATE 35: CPT | Mod: GC | Performed by: FAMILY MEDICINE

## 2022-09-08 PROCEDURE — 700102 HCHG RX REV CODE 250 W/ 637 OVERRIDE(OP): Performed by: STUDENT IN AN ORGANIZED HEALTH CARE EDUCATION/TRAINING PROGRAM

## 2022-09-08 PROCEDURE — 770001 HCHG ROOM/CARE - MED/SURG/GYN PRIV*

## 2022-09-08 RX ADMIN — ACETAMINOPHEN 650 MG: 325 TABLET, FILM COATED ORAL at 17:23

## 2022-09-08 RX ADMIN — LEVETIRACETAM 1000 MG: 500 TABLET, FILM COATED ORAL at 17:24

## 2022-09-08 RX ADMIN — LEVETIRACETAM 1000 MG: 500 TABLET, FILM COATED ORAL at 04:55

## 2022-09-08 RX ADMIN — IBUPROFEN 400 MG: 800 TABLET, FILM COATED ORAL at 04:56

## 2022-09-08 RX ADMIN — IBUPROFEN 400 MG: 800 TABLET, FILM COATED ORAL at 17:24

## 2022-09-08 RX ADMIN — SENNOSIDES AND DOCUSATE SODIUM 2 TABLET: 50; 8.6 TABLET ORAL at 04:56

## 2022-09-08 RX ADMIN — RIVAROXABAN 10 MG: 10 TABLET, FILM COATED ORAL at 17:24

## 2022-09-08 RX ADMIN — SENNOSIDES AND DOCUSATE SODIUM 2 TABLET: 50; 8.6 TABLET ORAL at 17:24

## 2022-09-08 RX ADMIN — ACETAMINOPHEN 650 MG: 325 TABLET, FILM COATED ORAL at 11:42

## 2022-09-08 RX ADMIN — IBUPROFEN 400 MG: 800 TABLET, FILM COATED ORAL at 11:42

## 2022-09-08 RX ADMIN — ACETAMINOPHEN 650 MG: 325 TABLET, FILM COATED ORAL at 04:56

## 2022-09-08 NOTE — CARE PLAN
The patient is Stable - Low risk of patient condition declining or worsening    Shift Goals  Clinical Goals: seizure precautions  Patient Goals: DC, rest, safety  Family Goals: safety, rest    Progress made toward(s) clinical / shift goals:    Problem: Pain - Standard  Goal: Alleviation of pain or a reduction in pain to the patient’s comfort goal  Outcome: Progressing     Problem: Knowledge Deficit - Standard  Goal: Patient and family/care givers will demonstrate understanding of plan of care, disease process/condition, diagnostic tests and medications  Outcome: Progressing     Problem: Skin Integrity  Goal: Skin integrity is maintained or improved  Outcome: Progressing

## 2022-09-08 NOTE — DISCHARGE PLANNING
[facility] Transitional [facility] Coordination    Referral from: Dr. Snyder    Insurance Provider on Facesheet: [employer]    Potential Rehab Diagnosis: NTBI    Chart review indicates patient may have on going medical management and may have therapy needs to possibly meet inpatient rehab facility criteria with the goal of returning to community.    D/C support will need to be verified: Mother    Physiatry consultation pended per protocol. TX pending.     Thank you for the referral.

## 2022-09-08 NOTE — CARE PLAN
The patient is Watcher - Medium risk of patient condition declining or worsening    Shift Goals  Clinical Goals: Get MRI done  Patient Goals: rest  Family Goals: able to go back to baseline    Progress made toward(s) clinical / shift goals:  MRI done patient resting.    Patient is not progressing towards the following goals:

## 2022-09-08 NOTE — NON-PROVIDER
[facility] [place] MEDICINE PROGRESS NOTE     Attending:   Dr. Lena Obregon MD    Resident:   Dr. Marino Snyder MD    PATIENT:   Celestine Calderón; [id number]; 1990    SUBJECTIVE:   No acute events overnight, patient slept well and is feeling better this morning. This morning she is able to answer questions with complete sentences. She reports no headache, nausea, or myalgias. She is complaining of slight dizziness. She is eating and drinking normally and having normal voiding and bowel movements. She does report some irregular menstrual bleeding, but is unconcerned. She recently removed the Kyleena IUD, per patient, and had a period 2 weeks ago and then started bleeding again last night. She reports no other concerns or questions.     OBJECTIVE:  Vitals:    09/07/22 1601 09/07/22 1903 09/08/22 0455 09/08/22 0838   BP: 104/59 102/67 116/77 110/68   Pulse: 93 77 70 63   Resp: 14 16 18 18   Temp: 37.3 °C (99.1 °F) 36.4 °C (97.6 °F) 36.5 °C (97.7 °F) 36.2 °C (97.2 °F)   TempSrc: Oral Temporal Temporal Temporal   SpO2: 97% 97% 96% 97%   Weight:       Height:         No intake or output data in the 24 hours ending 09/08/22 0844    PHYSICAL EXAM: patient resting in bed, watching TV  General: No acute distress, afebrile, resting comfortably, conversational   HEENT: NC/AT. EOMI.   Cardiovascular: RRR without murmurs, rubs, heaves.   Respiratory: CTAB, no tachypnea or retractions   Abdomen: normal bowel sounds, soft, nontender, nondistended  EXT:  GARDNER, no edema  Skin: No erythema/lesions on face/arms/legs visible- did not look where gown covered  Neuro: Non-focal, alert and orientated. CN II-XII intact with 5+ strength in upper and lower extremities bilaterally      LABS:  Recent Labs     09/05/22  1127 09/06/22  0845 09/07/22  0443   WBC 6.2 5.3 5.9   RBC 4.88 5.07 4.53   HEMOGLOBIN 14.9 15.5 14.1   HEMATOCRIT 43.4 45.3 40.1   MCV 88.9 89.3 88.5   MCH 30.5 30.6 31.1   RDW 39.1 39.9 39.5   PLATELETCT 164 167 160*   MPV 10.7 10.6  10.5   NEUTSPOLYS 56.90 69.30 67.20   LYMPHOCYTES 30.60 20.90* 24.70   MONOCYTES 11.10 8.20 6.90   EOSINOPHILS 0.30 0.20 0.30   BASOPHILS 0.80 0.80 0.70     Recent Labs     09/05/22  1127 09/06/22  0845 09/07/22  0443   SODIUM 134* 136 136   POTASSIUM 3.8 3.8 3.6   CHLORIDE 103 105 106   CO2 23 18* 18*   BUN 11 12 11   CREATININE 0.74 0.80 0.65   CALCIUM 8.8 9.0 8.4*   MAGNESIUM  --  2.1  --    ALBUMIN 4.4 4.4 4.0     Estimated GFR/CRCL = Estimated Creatinine Clearance: 123.2 mL/min (by C-G formula based on SCr of 0.65 mg/dL).  Recent Labs     09/05/22 1127 09/06/22  0845 09/07/22  0443   GLUCOSE 107* 114* 77     Recent Labs     09/05/22 1127 09/06/22  0845 09/07/22  0443   ASTSGOT 14 13 13   ALTSGPT 10 11 8   TBILIRUBIN 0.5 0.4 0.5   ALKPHOSPHAT 64 66 62   GLOBULIN 2.5 2.4 2.2             No results for input(s): INR, APTT, FIBRINOGEN in the last 72 hours.    Invalid input(s): DIMER    MICROBIOLOGY:   No results found for: BLOODCULTU, BLDCULT, BCHOLD     IMAGING:   US-PELVIC TRANSABDOMINAL ONLY   Final Result      1.  Normal transabdominal appearance of the pelvis.      MR-BRAIN-WITH & W/O   Final Result         Edema of the left medial temporal lobe and hippocampus noted involving the cortex and adjacent white matter. This extends to the anterior medial temporal cortex as well. Differential diagnosis includes encephalitis (herpes and atypical encephalitides),    inflammatory/autoimmune conditions such as limbic encephalitis.      DX-CHEST-PORTABLE (1 VIEW)   Final Result      No acute cardiopulmonary abnormality.             CULTURES:   Results       Procedure Component Value Units Date/Time    URINE CULTURE(NEW) [[id number]] Collected: 09/06/22 2000    Order Status: Completed Specimen: Urine Updated: 09/08/22 0803     Significant Indicator NEG     Source UR     Site -     Culture Result Usual urogenital armani 10-50,000 cfu/mL    Narrative:      Indication for culture:->Evaluation for sepsis without a  clear  "source of infection  Indication for culture:->Evaluation for sepsis without a    CSF CULTURE [[id number]] Collected: 09/06/22 1023    Order Status: Completed Specimen: CSF from Tap Updated: 09/07/22 1305     Significant Indicator NEG     Source CSF     Site TAP     Culture Result No growth at 24 hours.     Gram Stain Result No organisms seen.    BLOOD CULTURE [[id number]] Collected: 09/06/22 0845    Order Status: Completed Specimen: Blood from Peripheral Updated: 09/07/22 0839     Significant Indicator NEG     Source BLD     Site PERIPHERAL     Culture Result No Growth  Note: Blood cultures are incubated for 5 days and  are monitored continuously.Positive blood cultures  are called to the RN and reported as soon as  they are identified.      Narrative:      Per Hospital Policy: Only change Specimen Src: to \"Line\" if  specified by physician order.  Right AC    BLOOD CULTURE [[id number]] Collected: 09/06/22 0909    Order Status: Completed Specimen: Blood from Peripheral Updated: 09/07/22 0839     Significant Indicator NEG     Source BLD     Site PERIPHERAL     Culture Result No Growth  Note: Blood cultures are incubated for 5 days and  are monitored continuously.Positive blood cultures  are called to the RN and reported as soon as  they are identified.      Narrative:      Per Hospital Policy: Only change Specimen Src: to \"Line\" if  specified by physician order.  No site indicated    URINALYSIS [[id number]]  (Abnormal) Collected: 09/06/22 2000    Order Status: Completed Specimen: Urine Updated: 09/06/22 2128     Color Yellow     Character Turbid     Specific Gravity 1.022     Ph 5.5     Glucose Negative mg/dL      Ketones 40 mg/dL      Protein Negative mg/dL      Bilirubin Negative     Urobilinogen, Urine 0.2     Nitrite Negative     Leukocyte Esterase Small     Occult Blood Negative     Micro Urine Req Microscopic    Narrative:      Indication for culture:->Evaluation for sepsis without a  clear source of infection    " GRAM STAIN [[id number]] Collected: 09/06/22 1023    Order Status: Completed Specimen: CSF Updated: 09/06/22 1238     Significant Indicator .     Source CSF     Site TAP     Gram Stain Result No organisms seen.            MEDS:  Current Facility-Administered Medications   Medication Last Admin    acetaminophen (Tylenol) tablet 650 mg 650 mg at 09/08/22 0456    ibuprofen (MOTRIN) tablet 400 mg 400 mg at 09/08/22 0456    rivaroxaban (XARELTO) tablet 10 mg 10 mg at 09/07/22 1849    levETIRAcetam (KEPPRA) tablet 1,000 mg 1,000 mg at 09/08/22 0455    senna-docusate (PERICOLACE or SENOKOT S) 8.6-50 MG per tablet 2 Tablet 2 Tablet at 09/08/22 0456    And    polyethylene glycol/lytes (MIRALAX) PACKET 1 Packet      And    magnesium hydroxide (MILK OF MAGNESIA) suspension 30 mL      And    bisacodyl (DULCOLAX) suppository 10 mg      ondansetron (ZOFRAN) syringe/vial injection 4 mg 4 mg at 09/06/22 1819    ondansetron (ZOFRAN ODT) dispertab 4 mg      promethazine (PHENERGAN) tablet 12.5-25 mg      promethazine (PHENERGAN) suppository 12.5-25 mg      prochlorperazine (COMPAZINE) injection 5-10 mg      LORazepam (ATIVAN) injection 4 mg         ASSESSMENT/PLAN: 32 y.o. female admitted on 9/6/22 for generalized clonic tonic seizures following a week long history of myalgia and severe headache on hospital day 2, diagnosed with encephalitis of unknown etiology, supported by MRI, being treated supportively.    #Encephalitis, unknown etiology  #seizure, new onset  #altered mental status  The patient's MRI shows encephalitis of the left medial temporal lobe and hippocampus. Encephalitis is the likely cause of the seizures and altered mental status. The cranial nerve exam has consistently showed no focal deficits. The elevated CSF WBC and protein is indicative of viral meningitis however the PCR panel came back entirely negative. The [place] lab is still pending. Considering the patient's improvement today, a strong clinical suspicious  of viral encephalitis, and the likelihood of negative PCR panels, I suspect this is viral encephalitis and should be managed as such. However, autoimmune encephalitis is still a possibility, with a lower level of suspicion. The patient did have a seizure, but there has been no continued AMS, change of personality, or psychiatric decline. Additionally, the patient is improving. The lack of additional psychiatric dysfunction with the relatively quick improvement makes autoimmune encephalitis less likely. However we did order and NMDAR antibody test with her CSF serum and that is still pending. We also ordered a pelvic ultrasound due to some abdominal tenderness and because of the high incidence of ovarian teratomas with NMDA-encephalitis, the US was WNL.    -continue keppra and symptom management  -consult neurology for seizure prognosis, keppra continuation, and potential need for f/u  -engage in shared decision making with patient and her family on how much more they would like to investigate and rule out other causes of encephalitis- autoimmune panel, redo lumbar puncture, f/u neurology    #dizziness  Patient reports some dizziness when getting up out of bed. This has improved from yesterday.    -continue drinking fluids  -take precautions- sit up slowly out of bed, wait before standing up    #intermenstrual vaginal bleeding  Patient reports starting vaginal bleeding 9/7/22 evening. Last menstrual period was 2 weeks ago. Yesterday there was some abdominal tenderness, this is best explained my onset of menses. The patient had the IUD removed about a month ago. There is little concern for clinical significance given the recent illness and removal of IUD.    -f/u GYN    Disposition: patient will remain in hospital for an additional night to monitor symptoms, but is medically stable    Nancy Vargas, MS3  [facility]

## 2022-09-08 NOTE — PROGRESS NOTES
This RN received a call from [employer] with instructions for pt's scheduled US. Educated parents of pt who were at bedside. US is now  complete. Pt is currently sleeping and appears comfortable. Bedside table and call light are within reach.

## 2022-09-08 NOTE — PROGRESS NOTES
[facility] MEDICINE PROGRESS NOTE     Attending:   Lena Obregon MD    Resident:   Marino Snyder DO    PATIENT: Celestine Calderón; [id number]; 1990    ID: 32 y.o. female admitted for seizure    SUBJECTIVE: No acute events overnight, this morning she feels improved, clearer mentation and less headache    OBJECTIVE:     Vitals:    09/07/22 0801 09/07/22 1601 09/07/22 1903 09/08/22 0455   BP: 125/74 104/59 102/67 116/77   Pulse: 83 93 77 70   Resp: 17 14 16 18   Temp: 37.1 °C (98.7 °F) 37.3 °C (99.1 °F) 36.4 °C (97.6 °F) 36.5 °C (97.7 °F)   TempSrc: Temporal Oral Temporal Temporal   SpO2: 97% 97% 97% 96%   Weight:       Height:         No intake or output data in the 24 hours ending 09/08/22 0732    PE:  General: No acute distress, resting comfortably in bed. Much more awake and alert this morning than previously.   HEENT: Normocephalic, atraumatic. EOMI. MMM  Cardiovascular: RRR with no M/R/G.  Respiratory: Symmetrical chest. Clear to auscultation bilaterally with no wheezes, rales or rhonchi  Abdomen: soft, non-tender, non-distended no masses, +BS   EXT:  Moves all extremities, grossly normal. No edema or cyanosis. 2+ pulses in all extremities  Neuro: non focal, sensation grossly intact    LABS: reviewed    MICROBIOLOGY: CSF and blood culture no growth to date    IMAGING:   US-PELVIC TRANSABDOMINAL ONLY   Final Result      1.  Normal transabdominal appearance of the pelvis.      MR-BRAIN-WITH & W/O   Final Result         Edema of the left medial temporal lobe and hippocampus noted involving the cortex and adjacent white matter. This extends to the anterior medial temporal cortex as well. Differential diagnosis includes encephalitis (herpes and atypical encephalitides),    inflammatory/autoimmune conditions such as limbic encephalitis.      DX-CHEST-PORTABLE (1 VIEW)   Final Result      No acute cardiopulmonary abnormality.             MEDS: reviewed    ASSESSMENT/PLAN: 32 yoF with new seizure and encephalitis.     #  seizure  # encephalitis  MR brain demonstrated findings concerning for encephalitis. Thus far nothing in CSF studies is treatable. [place] and NMDA receptor antibodies are pending and we will follow these up. We will continue supportive care in the meantime. She is much improved today from a headache and overall feeling standpoint. Discussed with Neurology who recommended continuing Keppra for 3-6 months and for follow-up with [facility]. She cannot drive and we will complete DMV form.   - PM&R to evaluate for possible inpatient rehab if patient feels like it might be beneficial    Core Measures:  DVT PPX: Xarelto  ABX: none  Lines: PIV  Fluids: none  PCP: LISE Cho  CODE STATUS: FULL CODE    Dispo: inpatient following seizure

## 2022-09-08 NOTE — CARE PLAN
The patient is Stable - Low risk of patient condition declining or worsening    Shift Goals  Clinical Goals: safety  Patient Goals: dc, rest  Family Goals: dc    Progress made toward(s) clinical / shift goals:  no changes. Pt's neuro remained stable. No complaints of pain.      Problem: Pain - Standard  Goal: Alleviation of pain or a reduction in pain to the patient’s comfort goal  Outcome: Progressing     Problem: Knowledge Deficit - Standard  Goal: Patient and family/care givers will demonstrate understanding of plan of care, disease process/condition, diagnostic tests and medications  Outcome: Progressing     Problem: Skin Integrity  Goal: Skin integrity is maintained or improved  Outcome: Progressing         Patient is not progressing towards the following goals:

## 2022-09-08 NOTE — DISCHARGE PLANNING
Case Management Discharge Planning    Admission Date: 9/6/2022  GMLOS: 5.6  ALOS: 2    6-Clicks ADL Score: 19  6-Clicks Mobility Score: 17  PT and/or OT Eval ordered: No  Post-acute Referrals Ordered: No  Post-acute Choice Obtained: No  Has referral(s) been sent to post-acute provider:  No      Anticipated Discharge Dispo: Discharge Disposition: Disch to  rehab facility or distinct part unit (62)  Pending PMR referral to assist with discharge planning.    DME Needed: No    Action(s) Taken: Updated Provider/Nurse on Discharge Plan and OTHER: Reviewed chart and updated discharge plan. Reviewed treatment and discharge plans, needs, and barriers with medical and nursing teams during IDT rounds.    RN CM notified RN Charge of note by Jadiel PAUL Clinical Admissions Coordinator at [facility] that indicates a potential for acute IPR and to consider a PMR referral to assist with discharge planning.    Escalations Completed: Provider, Pending Discharge Destination, and Bedside RN    Medically Clear: No    Next Steps: f/u with medical and nursing teams regarding discharge planning needs/barriers and assist as indicated. f/u with patient and family regarding discharge planning needs/barriers and update discharge plan as indicated.    Barriers to Discharge: Medical clearance, Pending Placement, Pending PT Evaluation, and Pending Procedures    Is the patient up for discharge tomorrow: No

## 2022-09-09 VITALS
HEART RATE: 94 BPM | BODY MASS INDEX: 28.23 KG/M2 | SYSTOLIC BLOOD PRESSURE: 121 MMHG | DIASTOLIC BLOOD PRESSURE: 86 MMHG | RESPIRATION RATE: 18 BRPM | WEIGHT: 165.34 LBS | OXYGEN SATURATION: 96 % | HEIGHT: 64 IN | TEMPERATURE: 97.9 F

## 2022-09-09 LAB
BACTERIA CSF CULT: NORMAL
GRAM STN SPEC: NORMAL
SIGNIFICANT IND 70042: NORMAL
SITE SITE: NORMAL
SOURCE SOURCE: NORMAL
TEST NAME 95000: NORMAL

## 2022-09-09 PROCEDURE — RXMED WILLOW AMBULATORY MEDICATION CHARGE: Performed by: STUDENT IN AN ORGANIZED HEALTH CARE EDUCATION/TRAINING PROGRAM

## 2022-09-09 PROCEDURE — 700102 HCHG RX REV CODE 250 W/ 637 OVERRIDE(OP)

## 2022-09-09 PROCEDURE — A9270 NON-COVERED ITEM OR SERVICE: HCPCS

## 2022-09-09 PROCEDURE — A9270 NON-COVERED ITEM OR SERVICE: HCPCS | Performed by: STUDENT IN AN ORGANIZED HEALTH CARE EDUCATION/TRAINING PROGRAM

## 2022-09-09 PROCEDURE — 700102 HCHG RX REV CODE 250 W/ 637 OVERRIDE(OP): Performed by: STUDENT IN AN ORGANIZED HEALTH CARE EDUCATION/TRAINING PROGRAM

## 2022-09-09 PROCEDURE — 99238 HOSP IP/OBS DSCHRG MGMT 30/<: CPT | Mod: GC | Performed by: FAMILY MEDICINE

## 2022-09-09 PROCEDURE — 700111 HCHG RX REV CODE 636 W/ 250 OVERRIDE (IP)

## 2022-09-09 RX ORDER — LEVETIRACETAM 1000 MG/1
1000 TABLET ORAL 2 TIMES DAILY
Qty: 60 TABLET | Refills: 0 | Status: SHIPPED | OUTPATIENT
Start: 2022-09-09 | End: 2023-12-12

## 2022-09-09 RX ORDER — ONDANSETRON 4 MG/1
4 TABLET, ORALLY DISINTEGRATING ORAL EVERY 6 HOURS PRN
Qty: 10 TABLET | Refills: 0 | Status: SHIPPED | OUTPATIENT
Start: 2022-09-09 | End: 2023-07-17

## 2022-09-09 RX ADMIN — IBUPROFEN 400 MG: 800 TABLET, FILM COATED ORAL at 05:30

## 2022-09-09 RX ADMIN — ONDANSETRON 4 MG: 4 TABLET, ORALLY DISINTEGRATING ORAL at 09:09

## 2022-09-09 RX ADMIN — SENNOSIDES AND DOCUSATE SODIUM 2 TABLET: 50; 8.6 TABLET ORAL at 05:30

## 2022-09-09 RX ADMIN — LEVETIRACETAM 1000 MG: 500 TABLET, FILM COATED ORAL at 05:30

## 2022-09-09 RX ADMIN — ACETAMINOPHEN 650 MG: 325 TABLET, FILM COATED ORAL at 00:16

## 2022-09-09 RX ADMIN — ACETAMINOPHEN 650 MG: 325 TABLET, FILM COATED ORAL at 05:30

## 2022-09-09 RX ADMIN — IBUPROFEN 400 MG: 800 TABLET, FILM COATED ORAL at 00:16

## 2022-09-09 NOTE — CARE PLAN
The patient is Stable - Low risk of patient condition declining or worsening    Shift Goals  Clinical Goals: safety  Patient Goals: dc, rest  Family Goals: dc    Progress made toward(s) clinical / shift goals:  Problem: Pain - Standard  Goal: Alleviation of pain or a reduction in pain to the patient’s comfort goal  Outcome: Progressing     Problem: Knowledge Deficit - Standard  Goal: Patient and family/care givers will demonstrate understanding of plan of care, disease process/condition, diagnostic tests and medications  Outcome: Progressing     Problem: Skin Integrity  Goal: Skin integrity is maintained or improved  Outcome: Progressing     Problem: Seizure Precautions  Goal: Implementation of seizure precautions  Outcome: Progressing     Problem: Physical Regulation  Goal: Decrease in complications related to the disease process, condition or treatment  Outcome: Progressing     Problem: Knowledge Deficit - Seizures  Goal: Knowledge of seizure treatment regimen will improve  Outcome: Progressing       Patient is not progressing towards the following goals:

## 2022-09-09 NOTE — PROGRESS NOTES
Pt education complete, Epic wouldn't chart.    Education done on the following subjects with verbal teach back:  Seizure pecautions  Neuro checks  Fall prevention  Pain scale/control  Mobility  Oral care

## 2022-09-09 NOTE — DISCHARGE SUMMARY
"[facility] [place] FAMILY MEDICINE PROGRESS NOTE     Attending:   Lena Obregon MD    Resident:   Olivier Sheehan MD    PATIENT: Celestine Calderón; [id number]; 1990    ID: 32 y.o. previously healthy female admitted for seizure, encephalitis.     24 Hour Events: No acute events overnight. Patient reports she continues to feel better. States headache is resolved, reports some lightheadedness. States her intake of water has been low.  Denies sensation that room is spinning, visual changes, abdominal pain, nausea vomiting.  Reports bowel movement this morning that was \"normal.\"    Reports improved strength.  Patient states she is ready for discharge home.    OBJECTIVE:     Vitals:    09/08/22 1303 09/08/22 1950 09/09/22 0326 09/09/22 0800   BP: 107/69 119/71 116/61 121/86   Pulse: 61 63 68 94   Resp: 18 17 16 18   Temp: 37.3 °C (99.1 °F) 36.7 °C (98 °F) 36.2 °C (97.2 °F) 36.6 °C (97.9 °F)   TempSrc: Temporal Temporal Temporal Temporal   SpO2: 97% 96% 98% 96%   Weight:       Height:           Intake/Output Summary (Last 24 hours) at 9/9/2022 1559  Last data filed at 9/9/2022 1300  Gross per 24 hour   Intake 360 ml   Output --   Net 360 ml       PE:   General: No acute distress, resting comfortably in bed.  HEENT: Normocephalic, atraumatic. EOMI. MMM  Cardiovascular: RRR with no M/R/G.  Respiratory: CTAB. No wheezes, rales or rhonchi  Abdomen: soft, non-tender, non-distended no masses.  EXT:  No edema or cyanosis. 2+ pulses in all extremities  Neuro: non focal, sensation grossly intact    LABS:  Recent Labs     09/07/22  0443   WBC 5.9   RBC 4.53   HEMOGLOBIN 14.1   HEMATOCRIT 40.1   MCV 88.5   MCH 31.1   RDW 39.5   PLATELETCT 160*   MPV 10.5   NEUTSPOLYS 67.20   LYMPHOCYTES 24.70   MONOCYTES 6.90   EOSINOPHILS 0.30   BASOPHILS 0.70     Recent Labs     09/07/22  0443   SODIUM 136   POTASSIUM 3.6   CHLORIDE 106   CO2 18*   BUN 11   CREATININE 0.65   CALCIUM 8.4*   ALBUMIN 4.0     Estimated GFR/CRCL = Estimated Creatinine Clearance: 123.2 " "mL/min (by C-G formula based on SCr of 0.65 mg/dL).  Recent Labs     09/07/22  0443   GLUCOSE 77     Recent Labs     09/07/22  0443   ASTSGOT 13   ALTSGPT 8   TBILIRUBIN 0.5   ALKPHOSPHAT 62   GLOBULIN 2.2             No results for input(s): INR, APTT, FIBRINOGEN in the last 72 hours.    Invalid input(s): DIMER    MICROBIOLOGY: reviewed    IMAGING:   US-PELVIC TRANSABDOMINAL ONLY   Final Result      1.  Normal transabdominal appearance of the pelvis.      MR-BRAIN-WITH & W/O   Final Result         Edema of the left medial temporal lobe and hippocampus noted involving the cortex and adjacent white matter. This extends to the anterior medial temporal cortex as well. Differential diagnosis includes encephalitis (herpes and atypical encephalitides),    inflammatory/autoimmune conditions such as limbic encephalitis.      DX-CHEST-PORTABLE (1 VIEW)   Final Result      No acute cardiopulmonary abnormality.                 DISCHARGE SUMMARY:   Brief HPI from H&P:     \"Celestine Calderón is a 32 y.o. female with no past medical history who presented with suspected seizure. Patient's  gives most of the history during interview. He notes patient was feeling malaise, body aches, headache for the last 7 days or so. They went to Urgent Care two days ago. Symptoms worsened so they went to ED yesterday. This morning patient was hunched over in bed and \"locked-up\" and so came into the ED. They were discharged home but on the drive home patient felt nauseated and they had to pull the car over so she could vomit.  notes that patient looked \"stiff\" and appeared to have a seizure with shaking of her upper extremities. They called an ambulance and came back to the ED.      No recent fevers, illness, sick contacts, travel, cough, chest pain, diarrhea. No history of seizures or neurologic concerns.  thinks she is up to date on childhood vaccinations but she is not vaccinated against COVID. No new medications. Has allergy to " "penicillin.      ERCourse:  Recent ED workup with normal vitals, elevated lactic acid to 4.7, negative beta hcg, reassuring CT head, CSF with elevated protein, normal glucose and elevated WBC.\"    Admit Date:  9/6/2022         Discharge Date:  9/9/2022    Admission Diagnosis:   Encephalitis, seizure      Discharge Diagnosis:  Encephalitis    Chronic Diagnoses:               None       Hospital Course by Problem:    # seizure  # encephalitis  Patient with new onset seizure prior to admission. CSF with viral versus aseptic encephalitis picture. MR brain demonstrated findings concerning for encephalitis with hippocampal enhancement. [place], HSV, VSV, viral panel negative. NMDA receptor antibodies are pending. Patient was initially placed on acyclovir which was shortly discontinued after negative HSV CSF study. Patient was treated with supportive care throughout duration of her hospitalization with significant improvement.  Encephalitis likely viral in context of resolution with supportive care only. Less likely autoimmune encephalitis in the context of lack of preceding personality changes, natural course of disease improving without steroids.  Patient does have family history of autoimmune disease. Discussed case with Neurology who recommended continuing Keppra for 3-6 months and for follow-up with [facility]psy clinic. Patient with no neurologic deficits at discharge. With some dizziness, likely related to Keppra treatment versus decreased fluid intake. Patient legally unable until seizure-free for 3 months.    #Lactic Acidosis  Lactic acid elevated to 4.7 on admission.  Was likely due to seizure.  Follow-up lactic acid two hours after initial returned to normal at 1.0.       Consultants:      Neurology    Procedures:        Lumbar Puncture     Disposition:   Home    Diet:   Regular    Activity:   Ad wilder     Discharge Medications:           Medication List        START taking these medications        " Instructions   levetiracetam 1000 MG tablet  Commonly known as: KEPPRA   Take 1 Tablet by mouth 2 times a day.  Dose: 1,000 mg     ondansetron 4 MG Tbdp  Commonly known as: Zofran ODT   Take 1 Tablet by mouth every 6 hours as needed for Nausea.  Dose: 4 mg            CONTINUE taking these medications        Instructions   acetaminophen 500 MG Tabs  Commonly known as: TYLENOL   Take 1,000 mg by mouth one time. Indications: Pain  Dose: 1,000 mg     cyclobenzaprine 10 mg Tabs  Commonly known as: Flexeril   Take 10 mg by mouth 3 times a day as needed.  Dose: 10 mg     ibuprofen 200 MG Tabs  Commonly known as: MOTRIN   Take 200 mg by mouth every 6 hours as needed. Indications: Pain  Dose: 200 mg              Instructions:        Patient discharged on Keppra 1000 mg twice daily at recommendation of neurology, for a course of 3 to 6 months.  Follow-up with neurology in epilepsy clinic in 1 to 2 months.    Follow-up with primary care in 1 to 2 weeks.     The patient was instructed to return to the ER in the event of worsening symptoms. I have counseled the patient on the importance of compliance and the patient has agreed to proceed with all medical recommendations and follow up plan indicated above.   The patient understands that all medications come with benefits and risks. Risks may include permanent injury or death and these risks can be minimized with close reassessment and monitoring.        Please CC: Adilene Cho P.A.-C.    Follow up appointment details:      Follow with neurology in 1-2 months in epilepsy clinic     Follow with PCP in 1-2 weeks     Pending Studies:        Anti-NDMA antibody CSF study

## 2022-09-09 NOTE — NON-PROVIDER
[facility] [place] FAMILY MEDICINE PROGRESS NOTE     Attending:   Dr. Lena Obregon MD    Resident:   Dr. Marino Snyder MD PGY-3  Dr. Vickey Good PGY-1    Medical Student  Nancy Vargas, MS3    PATIENT:   Celestine Calderón; [id number]; 1990    SUBJECTIVE:   No acute events overnight, patient slept well and states she is feeling a little better than yesterday. She has no headache, myalgia, She only complains of dizziness, like she is spinning, when she sits up/gets out of bed.     OBJECTIVE:  Vitals:    09/08/22 0838 09/08/22 1303 09/08/22 1950 09/09/22 0326   BP: 110/68 107/69 119/71 116/61   Pulse: 63 61 63 68   Resp: 18 18 17 16   Temp: 36.2 °C (97.2 °F) 37.3 °C (99.1 °F) 36.7 °C (98 °F) 36.2 °C (97.2 °F)   TempSrc: Temporal Temporal Temporal Temporal   SpO2: 97% 97% 96% 98%   Weight:       Height:         No intake or output data in the 24 hours ending 09/09/22 0830    PHYSICAL EXAM: patient resting in bed  General: No acute distress, afebrile, resting comfortably, conversational   HEENT: NC/AT.   Cardiovascular: RRR without murmurs, rubs, heaves. Normal capillary refill   Neuro: Non-focal, alert and orientated       LABS:  Recent Labs     09/06/22  0845 09/07/22  0443   WBC 5.3 5.9   RBC 5.07 4.53   HEMOGLOBIN 15.5 14.1   HEMATOCRIT 45.3 40.1   MCV 89.3 88.5   MCH 30.6 31.1   RDW 39.9 39.5   PLATELETCT 167 160*   MPV 10.6 10.5   NEUTSPOLYS 69.30 67.20   LYMPHOCYTES 20.90* 24.70   MONOCYTES 8.20 6.90   EOSINOPHILS 0.20 0.30   BASOPHILS 0.80 0.70     Recent Labs     09/06/22  0845 09/07/22  0443   SODIUM 136 136   POTASSIUM 3.8 3.6   CHLORIDE 105 106   CO2 18* 18*   BUN 12 11   CREATININE 0.80 0.65   CALCIUM 9.0 8.4*   MAGNESIUM 2.1  --    ALBUMIN 4.4 4.0     Estimated GFR/CRCL = Estimated Creatinine Clearance: 123.2 mL/min (by C-G formula based on SCr of 0.65 mg/dL).  Recent Labs     09/06/22  0845 09/07/22  0443   GLUCOSE 114* 77     Recent Labs     09/06/22  0845 09/07/22  0443   ASTSGOT 13 13   ALTSGPT 11 8   TBILIRUBIN 0.4  0.5   ALKPHOSPHAT 66 62   GLOBULIN 2.4 2.2             No results for input(s): INR, APTT, FIBRINOGEN in the last 72 hours.    Invalid input(s): DIMER    MICROBIOLOGY:   No results found for: BLOODCULTU, BLDCULT, BCHOLD     IMAGING:   US-PELVIC TRANSABDOMINAL ONLY   Final Result      1.  Normal transabdominal appearance of the pelvis.      MR-BRAIN-WITH & W/O   Final Result         Edema of the left medial temporal lobe and hippocampus noted involving the cortex and adjacent white matter. This extends to the anterior medial temporal cortex as well. Differential diagnosis includes encephalitis (herpes and atypical encephalitides),    inflammatory/autoimmune conditions such as limbic encephalitis.      DX-CHEST-PORTABLE (1 VIEW)   Final Result      No acute cardiopulmonary abnormality.             CULTURES:   Results       Procedure Component Value Units Date/Time    CSF CULTURE [[id number]] Collected: 09/06/22 1023    Order Status: Completed Specimen: CSF from Tap Updated: 09/09/22 0819     Significant Indicator NEG     Source CSF     Site TAP     Culture Result No growth at 72 hours.     Gram Stain Result No organisms seen.    URINE CULTURE(NEW) [[id number]] Collected: 09/06/22 2000    Order Status: Completed Specimen: Urine Updated: 09/08/22 0803     Significant Indicator NEG     Source UR     Site -     Culture Result Usual urogenital armani 10-50,000 cfu/mL    Narrative:      Indication for culture:->Evaluation for sepsis without a  clear source of infection  Indication for culture:->Evaluation for sepsis without a    BLOOD CULTURE [[id number]] Collected: 09/06/22 0845    Order Status: Completed Specimen: Blood from Peripheral Updated: 09/07/22 0839     Significant Indicator NEG     Source BLD     Site PERIPHERAL     Culture Result No Growth  Note: Blood cultures are incubated for 5 days and  are monitored continuously.Positive blood cultures  are called to the RN and reported as soon as  they are identified.       "Narrative:      Per Hospital Policy: Only change Specimen Src: to \"Line\" if  specified by physician order.  Right AC    BLOOD CULTURE [[id number]] Collected: 09/06/22 0909    Order Status: Completed Specimen: Blood from Peripheral Updated: 09/07/22 0839     Significant Indicator NEG     Source BLD     Site PERIPHERAL     Culture Result No Growth  Note: Blood cultures are incubated for 5 days and  are monitored continuously.Positive blood cultures  are called to the RN and reported as soon as  they are identified.      Narrative:      Per Hospital Policy: Only change Specimen Src: to \"Line\" if  specified by physician order.  No site indicated    URINALYSIS [[id number]]  (Abnormal) Collected: 09/06/22 2000    Order Status: Completed Specimen: Urine Updated: 09/06/22 2128     Color Yellow     Character Turbid     Specific Gravity 1.022     Ph 5.5     Glucose Negative mg/dL      Ketones 40 mg/dL      Protein Negative mg/dL      Bilirubin Negative     Urobilinogen, Urine 0.2     Nitrite Negative     Leukocyte Esterase Small     Occult Blood Negative     Micro Urine Req Microscopic    Narrative:      Indication for culture:->Evaluation for sepsis without a  clear source of infection    GRAM STAIN [[id number]] Collected: 09/06/22 1023    Order Status: Completed Specimen: CSF Updated: 09/06/22 1238     Significant Indicator .     Source CSF     Site TAP     Gram Stain Result No organisms seen.            MEDS:  Current Facility-Administered Medications   Medication Last Admin    acetaminophen (Tylenol) tablet 650 mg 650 mg at 09/09/22 0530    ibuprofen (MOTRIN) tablet 400 mg 400 mg at 09/09/22 0530    rivaroxaban (XARELTO) tablet 10 mg 10 mg at 09/08/22 1724    levETIRAcetam (KEPPRA) tablet 1,000 mg 1,000 mg at 09/09/22 0530    senna-docusate (PERICOLACE or SENOKOT S) 8.6-50 MG per tablet 2 Tablet 2 Tablet at 09/09/22 0530    And    polyethylene glycol/lytes (MIRALAX) PACKET 1 Packet      And    magnesium hydroxide (MILK OF " MAGNESIA) suspension 30 mL      And    bisacodyl (DULCOLAX) suppository 10 mg      ondansetron (ZOFRAN) syringe/vial injection 4 mg 4 mg at 09/06/22 1819    ondansetron (ZOFRAN ODT) dispertab 4 mg      promethazine (PHENERGAN) tablet 12.5-25 mg      promethazine (PHENERGAN) suppository 12.5-25 mg      prochlorperazine (COMPAZINE) injection 5-10 mg      LORazepam (ATIVAN) injection 4 mg         ASSESSMENT/PLAN: 32 y.o. female admitted for new onset tonic clonic seizure on 9/6/22 and one week of myalgia and headache who is being treated for encephalitis of unknown etiology.    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 There is little clinical suspicion for autoimmune encephalitis because there was no evidence of personality changes, altered mental status prior to her post-ictal state, and she has improved without significant intervention. The patient is currently medically stable.  -continue keppra 3-6 months, per neurology. This means she cannot drive for that time period  -f/u epilepsy clinic to taper of keppra  -patient made aware that keppra is safe during pregnancy, but not all seizure medications are. Make sure to inform obgyn of new medication and neurologist on plans to conceive    #dizziness  The patient reports dizziness for the 2nd day, mostly when she gets up out of bed. She states it feels like she is spinning, not the room. I suspect this is orthostatic hypotension. The patients blood pressure remains around her normal. The seizure and encephalitis could be worsening this. I do not think there is enough concern for it to be further evaluated because it only occurs when she stands up and she does not have any neurologic deficits. Patient states she feels like she is drinking the same amount of water that she would at home.  -recommend patient drink more fluids that usual  -sit  up slowly  -examine for benign proxysmal positional vertigo    #discharge planning  Patient still feels timid to go home considering that she had gone to the hospital three times prior to getting admitted and given the fact we don't have a known etiology. Will have a conversation with her and her  later today on comfort to return home.      Disposition: patient is medically stable to go home with f/u with epilepsy clinic    Nancy Vargas, MS3  [facility] [employer] [facility]

## 2022-09-09 NOTE — DISCHARGE INSTRUCTIONS
Discharge Instructions    Discharged to home by car with relative. Discharged via wheelchair, hospital escort: Yes.  Special equipment needed: Not Applicable    Be sure to schedule a follow-up appointment with your primary care doctor or any specialists as instructed.     Discharge Plan:   Diet Plan: Discussed  Activity Level: Discussed  Confirmed Follow up Appointment: Patient to Call and Schedule Appointment  Confirmed Symptoms Management: Discussed  Medication Reconciliation Updated: Yes    I understand that a diet low in cholesterol, fat, and sodium is recommended for good health. Unless I have been given specific instructions below for another diet, I accept this instruction as my diet prescription.   Other diet: as tolerated    Special Instructions: None    -Is this patient being discharged with medication to prevent blood clots?  No    Is patient discharged on Warfarin / Coumadin?   No     Seizure, Adult  A seizure is a sudden burst of abnormal electrical activity in the brain. Seizures usually last from 30 seconds to 2 minutes. They can cause many different symptoms.  Usually, seizures are not harmful unless they last a long time.  What are the causes?  Common causes of this condition include:  Fever or infection.  Conditions that affect the brain, such as:  A brain abnormality that you were born with.  A brain or head injury.  Bleeding in the brain.  A tumor.  Stroke.  Brain disorders such as autism or cerebral palsy.  Low blood sugar.  Conditions that are passed from parent to child (are inherited).  Problems with substances, such as:  Having a reaction to a drug or a medicine.  Suddenly stopping the use of a substance (withdrawal).  In some cases, the cause may not be known. A person who has repeated seizures over time without a clear cause has a condition called epilepsy.  What increases the risk?  You are more likely to get this condition if you have:  A family history of epilepsy.  Had a seizure in the  past.  A brain disorder.  A history of head injury, lack of oxygen at birth, or strokes.  What are the signs or symptoms?  There are many types of seizures. The symptoms vary depending on the type of seizure you have. Examples of symptoms during a seizure include:  Shaking (convulsions).  Stiffness in the body.  Passing out (losing consciousness).  Head nodding.  Staring.  Not responding to sound or touch.  Loss of bladder control and bowel control.  Some people have symptoms right before and right after a seizure happens.  Symptoms before a seizure may include:  Fear.  Worry (anxiety).  Feeling like you may vomit (nauseous).  Feeling like the room is spinning (vertigo).  Feeling like you saw or heard something before (déjà vu).  Odd tastes or smells.  Changes in how you see. You may see flashing lights or spots.  Symptoms after a seizure happens can include:  Confusion.  Sleepiness.  Headache.  Weakness on one side of the body.  How is this treated?  Most seizures will stop on their own in under 5 minutes. In these cases, no treatment is needed. Seizures that last longer than 5 minutes will usually need treatment. Treatment can include:  Medicines given through an IV tube.  Avoiding things that are known to cause your seizures. These can include medicines that you take for another condition.  Medicines to treat epilepsy.  Surgery to stop the seizures. This may be needed if medicines do not help.  Follow these instructions at home:  Medicines  Take over-the-counter and prescription medicines only as told by your doctor.  Do not eat or drink anything that may keep your medicine from working, such as alcohol.  Activity  Do not do any activities that would be dangerous if you had another seizure, like driving or swimming. Wait until your doctor says it is safe for you to do them.  If you live in the [place] ask your local DMV (department of [place]) when you can drive.  Get plenty of rest.  Teaching others  Teach  friends and family what to do when you have a seizure. They should:  Lay you on the ground.  Protect your head and body.  Loosen any tight clothing around your neck.  Turn you on your side.  Not hold you down.  Not put anything into your mouth.  Know whether or not you need emergency care.  Stay with you until you are better.    General instructions  Contact your doctor each time you have a seizure.  Avoid anything that gives you seizures.  Keep a seizure diary. Write down:  What you think caused each seizure.  What you remember about each seizure.  Keep all follow-up visits as told by your doctor. This is important.  Contact a doctor if:  You have another seizure.  You have seizures more often.  There is any change in what happens during your seizures.  You keep having seizures with treatment.  You have symptoms of being sick or having an infection.  Get help right away if:  You have a seizure that:  Lasts longer than 5 minutes.  Is different than seizures you had before.  Makes it harder to breathe.  Happens after you hurt your head.  You have any of these symptoms after a seizure:  Not being able to speak.  Not being able to use a part of your body.  Confusion.  A bad headache.  You have two or more seizures in a row.  You do not wake up right after a seizure.  You get hurt during a seizure.  These symptoms may be an emergency. Do not wait to see if the symptoms will go away. Get medical help right away. Call your local emergency services (911 in the [place] Do not drive yourself to the hospital.  Summary  Seizures usually last from 30 seconds to 2 minutes. Usually, they are not harmful unless they last a long time.  Do not eat or drink anything that may keep your medicine from working, such as alcohol.  Teach friends and family what to do when you have a seizure.  Contact your doctor each time you have a seizure.  This information is not intended to replace advice given to you by your health care provider. Make  sure you discuss any questions you have with your health care provider.  Document Released: 06/05/2009 Document Revised: 03/06/2020 Document Reviewed: 03/06/2020  Elsevier Patient Education © 2020 [employer]

## 2022-09-10 ENCOUNTER — PHARMACY VISIT (OUTPATIENT)
Dept: PHARMACY | Facility: MEDICAL CENTER | Age: 32
End: 2022-09-10
Payer: COMMERCIAL

## 2022-09-11 ENCOUNTER — APPOINTMENT (OUTPATIENT)
Dept: RADIOLOGY | Facility: MEDICAL CENTER | Age: 32
End: 2022-09-11
Attending: INTERNAL MEDICINE
Payer: COMMERCIAL

## 2022-09-11 ENCOUNTER — HOSPITAL ENCOUNTER (OUTPATIENT)
Facility: MEDICAL CENTER | Age: 32
End: 2022-09-12
Attending: EMERGENCY MEDICINE | Admitting: INTERNAL MEDICINE
Payer: COMMERCIAL

## 2022-09-11 DIAGNOSIS — R41.82 ALTERED MENTAL STATUS, UNSPECIFIED ALTERED MENTAL STATUS TYPE: ICD-10-CM

## 2022-09-11 DIAGNOSIS — G93.40 ENCEPHALOPATHY: ICD-10-CM

## 2022-09-11 DIAGNOSIS — R52 PAIN RELATED TO VAGINAL DELIVERY: ICD-10-CM

## 2022-09-11 DIAGNOSIS — E83.51 HYPOCALCEMIA: ICD-10-CM

## 2022-09-11 DIAGNOSIS — G04.90 ENCEPHALITIS: ICD-10-CM

## 2022-09-11 LAB
AMPHET UR QL SCN: NEGATIVE
ANION GAP SERPL CALC-SCNC: 16 MMOL/L (ref 7–16)
BACTERIA BLD CULT: NORMAL
BACTERIA BLD CULT: NORMAL
BARBITURATES UR QL SCN: NEGATIVE
BASOPHILS # BLD AUTO: 0.5 % (ref 0–1.8)
BASOPHILS # BLD: 0.03 K/UL (ref 0–0.12)
BENZODIAZ UR QL SCN: NEGATIVE
BUN SERPL-MCNC: 14 MG/DL (ref 8–22)
BZE UR QL SCN: NEGATIVE
CALCIUM SERPL-MCNC: 9.6 MG/DL (ref 8.5–10.5)
CANNABINOIDS UR QL SCN: NEGATIVE
CHLORIDE SERPL-SCNC: 103 MMOL/L (ref 96–112)
CO2 SERPL-SCNC: 20 MMOL/L (ref 20–33)
CREAT SERPL-MCNC: 0.84 MG/DL (ref 0.5–1.4)
EOSINOPHIL # BLD AUTO: 0.08 K/UL (ref 0–0.51)
EOSINOPHIL NFR BLD: 1.5 % (ref 0–6.9)
ERYTHROCYTE [DISTWIDTH] IN BLOOD BY AUTOMATED COUNT: 37.2 FL (ref 35.9–50)
GFR SERPLBLD CREATININE-BSD FMLA CKD-EPI: 95 ML/MIN/1.73 M 2
GLUCOSE SERPL-MCNC: 85 MG/DL (ref 65–99)
HCT VFR BLD AUTO: 46.1 % (ref 37–47)
HGB BLD-MCNC: 16.7 G/DL (ref 12–16)
IMM GRANULOCYTES # BLD AUTO: 0.02 K/UL (ref 0–0.11)
IMM GRANULOCYTES NFR BLD AUTO: 0.4 % (ref 0–0.9)
LYMPHOCYTES # BLD AUTO: 1.68 K/UL (ref 1–4.8)
LYMPHOCYTES NFR BLD: 30.5 % (ref 22–41)
MCH RBC QN AUTO: 31 PG (ref 27–33)
MCHC RBC AUTO-ENTMCNC: 36.2 G/DL (ref 33.6–35)
MCV RBC AUTO: 85.7 FL (ref 81.4–97.8)
METHADONE UR QL SCN: NEGATIVE
MONOCYTES # BLD AUTO: 0.28 K/UL (ref 0–0.85)
MONOCYTES NFR BLD AUTO: 5.1 % (ref 0–13.4)
NEUTROPHILS # BLD AUTO: 3.41 K/UL (ref 2–7.15)
NEUTROPHILS NFR BLD: 62 % (ref 44–72)
NRBC # BLD AUTO: 0 K/UL
NRBC BLD-RTO: 0 /100 WBC
OPIATES UR QL SCN: NEGATIVE
OXYCODONE UR QL SCN: NEGATIVE
PCP UR QL SCN: NEGATIVE
PLATELET # BLD AUTO: 203 K/UL (ref 164–446)
PMV BLD AUTO: 10.5 FL (ref 9–12.9)
POTASSIUM SERPL-SCNC: 3.6 MMOL/L (ref 3.6–5.5)
PROPOXYPH UR QL SCN: NEGATIVE
RBC # BLD AUTO: 5.38 M/UL (ref 4.2–5.4)
SIGNIFICANT IND 70042: NORMAL
SIGNIFICANT IND 70042: NORMAL
SITE SITE: NORMAL
SITE SITE: NORMAL
SODIUM SERPL-SCNC: 139 MMOL/L (ref 135–145)
SOURCE SOURCE: NORMAL
SOURCE SOURCE: NORMAL
WBC # BLD AUTO: 5.5 K/UL (ref 4.8–10.8)

## 2022-09-11 PROCEDURE — 99285 EMERGENCY DEPT VISIT HI MDM: CPT

## 2022-09-11 PROCEDURE — 96375 TX/PRO/DX INJ NEW DRUG ADDON: CPT

## 2022-09-11 PROCEDURE — 700111 HCHG RX REV CODE 636 W/ 250 OVERRIDE (IP): Performed by: EMERGENCY MEDICINE

## 2022-09-11 PROCEDURE — G0378 HOSPITAL OBSERVATION PER HR: HCPCS

## 2022-09-11 PROCEDURE — 99220 PR INITIAL OBSERVATION CARE,LEVL III: CPT | Performed by: INTERNAL MEDICINE

## 2022-09-11 PROCEDURE — 80307 DRUG TEST PRSMV CHEM ANLYZR: CPT

## 2022-09-11 PROCEDURE — A9270 NON-COVERED ITEM OR SERVICE: HCPCS | Performed by: INTERNAL MEDICINE

## 2022-09-11 PROCEDURE — 70450 CT HEAD/BRAIN W/O DYE: CPT

## 2022-09-11 PROCEDURE — 96374 THER/PROPH/DIAG INJ IV PUSH: CPT

## 2022-09-11 PROCEDURE — 700105 HCHG RX REV CODE 258: Performed by: EMERGENCY MEDICINE

## 2022-09-11 PROCEDURE — 71045 X-RAY EXAM CHEST 1 VIEW: CPT

## 2022-09-11 PROCEDURE — 700111 HCHG RX REV CODE 636 W/ 250 OVERRIDE (IP)

## 2022-09-11 PROCEDURE — 36415 COLL VENOUS BLD VENIPUNCTURE: CPT

## 2022-09-11 PROCEDURE — 96376 TX/PRO/DX INJ SAME DRUG ADON: CPT

## 2022-09-11 PROCEDURE — 80048 BASIC METABOLIC PNL TOTAL CA: CPT

## 2022-09-11 PROCEDURE — 700102 HCHG RX REV CODE 250 W/ 637 OVERRIDE(OP): Performed by: INTERNAL MEDICINE

## 2022-09-11 PROCEDURE — 85025 COMPLETE CBC W/AUTO DIFF WBC: CPT

## 2022-09-11 RX ORDER — PROCHLORPERAZINE EDISYLATE 5 MG/ML
5-10 INJECTION INTRAMUSCULAR; INTRAVENOUS EVERY 4 HOURS PRN
Status: DISCONTINUED | OUTPATIENT
Start: 2022-09-11 | End: 2022-09-12 | Stop reason: HOSPADM

## 2022-09-11 RX ORDER — BISACODYL 10 MG
10 SUPPOSITORY, RECTAL RECTAL
Status: DISCONTINUED | OUTPATIENT
Start: 2022-09-11 | End: 2022-09-12 | Stop reason: HOSPADM

## 2022-09-11 RX ORDER — LEVETIRACETAM 500 MG/1
1000 TABLET ORAL 2 TIMES DAILY
Status: DISCONTINUED | OUTPATIENT
Start: 2022-09-11 | End: 2022-09-12 | Stop reason: HOSPADM

## 2022-09-11 RX ORDER — ONDANSETRON 2 MG/ML
4 INJECTION INTRAMUSCULAR; INTRAVENOUS EVERY 4 HOURS PRN
Status: DISCONTINUED | OUTPATIENT
Start: 2022-09-11 | End: 2022-09-12 | Stop reason: HOSPADM

## 2022-09-11 RX ORDER — TRAMADOL HYDROCHLORIDE 50 MG/1
50 TABLET ORAL EVERY 6 HOURS PRN
Status: DISCONTINUED | OUTPATIENT
Start: 2022-09-11 | End: 2022-09-12 | Stop reason: HOSPADM

## 2022-09-11 RX ORDER — ONDANSETRON 4 MG/1
4 TABLET, ORALLY DISINTEGRATING ORAL EVERY 4 HOURS PRN
Status: DISCONTINUED | OUTPATIENT
Start: 2022-09-11 | End: 2022-09-12 | Stop reason: HOSPADM

## 2022-09-11 RX ORDER — PROMETHAZINE HYDROCHLORIDE 25 MG/1
12.5-25 TABLET ORAL EVERY 4 HOURS PRN
Status: DISCONTINUED | OUTPATIENT
Start: 2022-09-11 | End: 2022-09-12 | Stop reason: HOSPADM

## 2022-09-11 RX ORDER — HYDROMORPHONE HYDROCHLORIDE 1 MG/ML
0.5 INJECTION, SOLUTION INTRAMUSCULAR; INTRAVENOUS; SUBCUTANEOUS EVERY 4 HOURS PRN
Status: DISCONTINUED | OUTPATIENT
Start: 2022-09-11 | End: 2022-09-12 | Stop reason: HOSPADM

## 2022-09-11 RX ORDER — PROMETHAZINE HYDROCHLORIDE 12.5 MG/1
12.5-25 SUPPOSITORY RECTAL EVERY 4 HOURS PRN
Status: DISCONTINUED | OUTPATIENT
Start: 2022-09-11 | End: 2022-09-12 | Stop reason: HOSPADM

## 2022-09-11 RX ORDER — HYDROMORPHONE HYDROCHLORIDE 1 MG/ML
0.5 INJECTION, SOLUTION INTRAMUSCULAR; INTRAVENOUS; SUBCUTANEOUS ONCE
Status: COMPLETED | OUTPATIENT
Start: 2022-09-11 | End: 2022-09-11

## 2022-09-11 RX ORDER — ACETAMINOPHEN 325 MG/1
650 TABLET ORAL EVERY 6 HOURS PRN
Status: DISCONTINUED | OUTPATIENT
Start: 2022-09-11 | End: 2022-09-12 | Stop reason: HOSPADM

## 2022-09-11 RX ORDER — POLYETHYLENE GLYCOL 3350 17 G/17G
1 POWDER, FOR SOLUTION ORAL
Status: DISCONTINUED | OUTPATIENT
Start: 2022-09-11 | End: 2022-09-12 | Stop reason: HOSPADM

## 2022-09-11 RX ORDER — ONDANSETRON 2 MG/ML
4 INJECTION INTRAMUSCULAR; INTRAVENOUS ONCE
Status: COMPLETED | OUTPATIENT
Start: 2022-09-11 | End: 2022-09-11

## 2022-09-11 RX ORDER — SODIUM CHLORIDE 9 MG/ML
1000 INJECTION, SOLUTION INTRAVENOUS ONCE
Status: COMPLETED | OUTPATIENT
Start: 2022-09-11 | End: 2022-09-11

## 2022-09-11 RX ORDER — AMOXICILLIN 250 MG
2 CAPSULE ORAL 2 TIMES DAILY
Status: DISCONTINUED | OUTPATIENT
Start: 2022-09-11 | End: 2022-09-12 | Stop reason: HOSPADM

## 2022-09-11 RX ADMIN — LEVETIRACETAM 1000 MG: 500 TABLET, FILM COATED ORAL at 18:51

## 2022-09-11 RX ADMIN — SODIUM CHLORIDE 1000 ML: 9 INJECTION, SOLUTION INTRAVENOUS at 13:41

## 2022-09-11 RX ADMIN — HYDROMORPHONE HYDROCHLORIDE 0.5 MG: 1 INJECTION, SOLUTION INTRAMUSCULAR; INTRAVENOUS; SUBCUTANEOUS at 22:06

## 2022-09-11 RX ADMIN — ACETAMINOPHEN 650 MG: 325 TABLET, FILM COATED ORAL at 17:03

## 2022-09-11 RX ADMIN — TRAMADOL HYDROCHLORIDE 50 MG: 50 TABLET, COATED ORAL at 18:36

## 2022-09-11 RX ADMIN — HYDROMORPHONE HYDROCHLORIDE 0.5 MG: 1 INJECTION, SOLUTION INTRAMUSCULAR; INTRAVENOUS; SUBCUTANEOUS at 13:39

## 2022-09-11 RX ADMIN — ONDANSETRON 4 MG: 2 INJECTION INTRAMUSCULAR; INTRAVENOUS at 13:39

## 2022-09-11 ASSESSMENT — ENCOUNTER SYMPTOMS
HEADACHES: 1
HALLUCINATIONS: 0
NAUSEA: 0
BLURRED VISION: 0
FEVER: 0
DOUBLE VISION: 0
CHILLS: 0
VOMITING: 0
SORE THROAT: 0
ABDOMINAL PAIN: 0
FALLS: 0
SEIZURES: 0
COUGH: 0
SHORTNESS OF BREATH: 0
LOSS OF CONSCIOUSNESS: 0
PALPITATIONS: 0
BACK PAIN: 0

## 2022-09-11 ASSESSMENT — FIBROSIS 4 INDEX: FIB4 SCORE: 0.92

## 2022-09-11 ASSESSMENT — LIFESTYLE VARIABLES
EVER HAD A DRINK FIRST THING IN THE MORNING TO STEADY YOUR NERVES TO GET RID OF A HANGOVER: NO
TOTAL SCORE: 0
CONSUMPTION TOTAL: NEGATIVE
HAVE YOU EVER FELT YOU SHOULD CUT DOWN ON YOUR DRINKING: NO
HAVE PEOPLE ANNOYED YOU BY CRITICIZING YOUR DRINKING: NO
SUBSTANCE_ABUSE: 0
EVER FELT BAD OR GUILTY ABOUT YOUR DRINKING: NO
AVERAGE NUMBER OF DAYS PER WEEK YOU HAVE A DRINK CONTAINING ALCOHOL: 0
TOTAL SCORE: 0
ON A TYPICAL DAY WHEN YOU DRINK ALCOHOL HOW MANY DRINKS DO YOU HAVE: 0
DO YOU DRINK ALCOHOL: NO
HOW MANY TIMES IN THE PAST YEAR HAVE YOU HAD 5 OR MORE DRINKS IN A DAY: 0
TOTAL SCORE: 0

## 2022-09-11 ASSESSMENT — PATIENT HEALTH QUESTIONNAIRE - PHQ9
SUM OF ALL RESPONSES TO PHQ9 QUESTIONS 1 AND 2: 0
2. FEELING DOWN, DEPRESSED, IRRITABLE, OR HOPELESS: NOT AT ALL
1. LITTLE INTEREST OR PLEASURE IN DOING THINGS: NOT AT ALL

## 2022-09-11 NOTE — ED NOTES
"Pt's  reporting pt pt is acting \"off\" and is now non-verbal. Neuro assessment performed and Dr. Espinoza updated. Pt pursing her lips, hands are tense. Pt able to follow some command appropriately and nods her head to respond to questions. Dr. Espinoza at bedside.   "

## 2022-09-11 NOTE — ED PROVIDER NOTES
"ED Provider Note    Scribed for Alverto Guzman M.D. by Juany Gomez. 9/11/2022  1:13 PM    Primary care provider: Adilene Cho P.A.-C.  Means of arrival: Walk-in  History obtained from: Spouse  History limited by: Acute medical condition    CHIEF COMPLAINT  Chief Complaint   Patient presents with    Headache     Pt admitted for viral meningitis x 4 days, discharged on Sept 9. Woke up with no headache today, but started having a bad headache 1 hour ago. Pt alert but answering \"I don't know\" to A&O questions. Mild nausea, neck supple, no fevers. Pt tearful, not answering specific questions, unable to directly answer whether headache is better or worse that past headaches. Took 1g tylenol & 4mg zofran 1 hour ago.       HPI  Celesitne Calderón is a 32 y.o. female who presents to the Emergency Department  for evaluation of a headache onset 1 hour ago. She was admitted 4 days ago for viral meningitis and encephalitis and discharged on September 9th. When she woke up this morning, she did not have a headache. She also started to become increasingly confused starting 2 hours ago to the point where she does not recognize her . She denies anything hurting besides her head. She has associated symptoms of nausea. She denies any fevers. She denies any alleviating or exacerbating factors.    REVIEW OF SYSTEMS  Pertinent positives include nausea, headache, and confusion. Pertinent negatives include no fevers . All other systems reviewed and negative. See HPI for further details.       PAST MEDICAL HISTORY  None noted     SURGICAL HISTORY   has a past surgical history that includes other surgical procedure; tonsillectomy (7/9/08); ethmoidectomy (7/9/08); antrostomy (7/9/08); and turbinoplasty (7/9/08).    SOCIAL HISTORY  Social History     Tobacco Use    Smoking status: Never    Smokeless tobacco: Never   Vaping Use    Vaping Use: Never used   Substance Use Topics    Alcohol use: Not Currently    Drug use: " "Never      Social History     Substance and Sexual Activity   Drug Use Never       FAMILY HISTORY  None noted    CURRENT MEDICATIONS  Home Medications       Reviewed by Denise Antunez (Pharmacy Intern) on 09/11/22 at 1329  Med List Status: Complete     Medication Last Dose Status   acetaminophen (TYLENOL) 500 MG Tab 9/11/2022 Active   ibuprofen (MOTRIN) 200 MG Tab 9/11/2022 Active   levetiracetam (KEPPRA) 1000 MG tablet 9/11/2022 Active   ondansetron (ZOFRAN ODT) 4 MG TABLET DISPERSIBLE 9/11/2022 Active                    ALLERGIES  Allergies   Allergen Reactions    Penicillins Hives     Family member states reaction occurred in childhood.       PHYSICAL EXAM  VITAL SIGNS: /84   Pulse 84   Temp 36.1 °C (96.9 °F) (Temporal)   Resp 20   Ht 1.626 m (5' 4\")   Wt 75 kg (165 lb 5.5 oz)   LMP 08/21/2022 (Approximate)   SpO2 98%   BMI 28.38 kg/m²     Nursing note and vitals reviewed.  Constitutional: Well-developed and well-nourished. No distress. Confused.  HENT: Head is normocephalic and atraumatic. Oropharynx is clear and moist without exudate or erythema.  Eyes: Pupils are equal, round, and reactive to light. Conjunctiva are normal. Dry mucus membranes.   Cardiovascular: Normal rate and regular rhythm. No murmur heard. Normal radial pulses.  Pulmonary/Chest: Breath sounds normal. No wheezes or rales.   Abdominal: Soft and non-tender. No distention    Musculoskeletal: Extremities exhibit normal range of motion without edema or tenderness.   Neurological: Awake, alert and oriented to person, place, and time. No focal deficits noted. AxO x2 but does not recognize her spouse. Moving all extremities  Skin: Skin is warm and dry. No rash.   Psychiatric: Normal mood and affect. Appropriate for clinical situation.    DIAGNOSTIC STUDIES / PROCEDURES    LABS  Results for orders placed or performed during the hospital encounter of 09/11/22   CBC WITH DIFFERENTIAL   Result Value Ref Range    WBC 5.5 4.8 - 10.8 " K/uL    RBC 5.38 4.20 - 5.40 M/uL    Hemoglobin 16.7 (H) 12.0 - 16.0 g/dL    Hematocrit 46.1 37.0 - 47.0 %    MCV 85.7 81.4 - 97.8 fL    MCH 31.0 27.0 - 33.0 pg    MCHC 36.2 (H) 33.6 - 35.0 g/dL    RDW 37.2 35.9 - 50.0 fL    Platelet Count 203 164 - 446 K/uL    MPV 10.5 9.0 - 12.9 fL    Neutrophils-Polys 62.00 44.00 - 72.00 %    Lymphocytes 30.50 22.00 - 41.00 %    Monocytes 5.10 0.00 - 13.40 %    Eosinophils 1.50 0.00 - 6.90 %    Basophils 0.50 0.00 - 1.80 %    Immature Granulocytes 0.40 0.00 - 0.90 %    Nucleated RBC 0.00 /100 WBC    Neutrophils (Absolute) 3.41 2.00 - 7.15 K/uL    Lymphs (Absolute) 1.68 1.00 - 4.80 K/uL    Monos (Absolute) 0.28 0.00 - 0.85 K/uL    Eos (Absolute) 0.08 0.00 - 0.51 K/uL    Baso (Absolute) 0.03 0.00 - 0.12 K/uL    Immature Granulocytes (abs) 0.02 0.00 - 0.11 K/uL    NRBC (Absolute) 0.00 K/uL   BASIC METABOLIC PANEL   Result Value Ref Range    Sodium 139 135 - 145 mmol/L    Potassium 3.6 3.6 - 5.5 mmol/L    Chloride 103 96 - 112 mmol/L    Co2 20 20 - 33 mmol/L    Glucose 85 65 - 99 mg/dL    Bun 14 8 - 22 mg/dL    Creatinine 0.84 0.50 - 1.40 mg/dL    Calcium 9.6 8.5 - 10.5 mg/dL    Anion Gap 16.0 7.0 - 16.0   ESTIMATED GFR   Result Value Ref Range    GFR (CKD-EPI) 95 >60 mL/min/1.73 m 2       All labs reviewed by me.    COURSE & MEDICAL DECISION MAKING  Nursing notes, VS, PMSFHx reviewed in chart.       1:13 PM - Patient seen and examined at bedside. Patient will be treated with NS infusion 1,000 mL, Dilaudid 0.5 mg, and Zofran 4 mg . Intravenous fluids were administered for evidence of dehydration.  Ordered CBC w/ diff and BMP to evaluate her symptoms.     1:22 PM - Paged Hospitalist.       1:45 PM - 1:56 PM I discussed the patient's case and the above findings with Dr. Espinoza (Hospitalist) who will admit the patient    Patient returns the emergency department with worsening confusion in setting of known encephalitis.  Thought to be viral in nature.  All diagnostic studies on CSF have  been negative.  Patient will be admitted for further evaluation and treatment.      HYDRATION: Based on the patient's presentation of Other decreased oral intake the patient was given IV fluids. IV Hydration was used because oral hydration was not adequate alone. Upon recheck following hydration, the patient was improved.        FINAL IMPRESSION  1. Encephalitis    2. Altered mental status, unspecified altered mental status type          I, Juany Gomez (Navi), am scribing for, and in the presence of, Alverto Guzman M.D..    Electronically signed by: Juany Gomez (Navi), 9/11/2022    IAlverto M.D. personally performed the services described in this documentation, as scribed by Juany Gomez in my presence, and it is both accurate and complete.    The note accurately reflects work and decisions made by me.  Alverto Guzman M.D.  9/11/2022  2:47 PM

## 2022-09-11 NOTE — ED TRIAGE NOTES
"Celestine Calderón  32 y.o.  female  Chief Complaint   Patient presents with   • Headache     Pt admitted for viral meningitis x 4 days, discharged on Sept 9. Woke up with no headache today, but started having a bad headache 1 hour ago. Pt alert but answering \"I don't know\" to A&O questions. Mild nausea, neck supple, no fevers. Pt tearful, not answering specific questions, unable to directly answer whether headache is better or worse that past headaches. Took 1g tylenol & 4mg zofran 1 hour ago.       Patient educated on triage process, to alert staff if any changes in condition.    "

## 2022-09-11 NOTE — ED NOTES
Med rec completed per significant other at bedside.    Allergies reviewed.    No abx in the last 30 days.    Home pharmacy is [facility] on [address].

## 2022-09-11 NOTE — ED NOTES
" came out and stated that pt is \"having  a hard time breathing.\" Assessed pt. Pt pursing her lips, no acute distress at this time, and VSS on the monitor. Updated Dr. Espinoza, orders received.   "

## 2022-09-11 NOTE — H&P
"Hospital Medicine History & Physical Note    Date of Service  9/11/2022    Primary Care Physician  Adilene Cho P.A.-C.    Code Status  Full Code    Chief Complaint  Chief Complaint   Patient presents with    Headache     Pt admitted for viral meningitis x 4 days, discharged on Sept 9. Woke up with no headache today, but started having a bad headache 1 hour ago. Pt alert but answering \"I don't know\" to A&O questions. Mild nausea, neck supple, no fevers. Pt tearful, not answering specific questions, unable to directly answer whether headache is better or worse that past headaches. Took 1g tylenol & 4mg zofran 1 hour ago.       History of Presenting Illness  Celestine Calderón is a 32 y.o. female who presented 9/11/2022 with headache, confusion.  Recently admitted 9/6-9/9 for new onset seizure, encephalitis.  Lumbar puncture was negative for West Nile, HSV, VSV.  Patient was discharged on keppra BID after improvement in headaches and return to baseline mentation.  Over the last 48 hours while at home patient has had waxing and waning headaches as well as changes in mentation.  Upon presentation to the ED today patient is ANO x1 and does not recognize her  at bedside.    I discussed the plan of care with patient, family, bedside RN, and ER provider .    Review of Systems  Review of Systems   Constitutional:  Negative for chills and fever.   HENT:  Negative for congestion and sore throat.    Eyes:  Negative for blurred vision and double vision.   Respiratory:  Negative for cough and shortness of breath.    Cardiovascular:  Negative for chest pain and palpitations.   Gastrointestinal:  Negative for abdominal pain, nausea and vomiting.   Genitourinary:  Negative for dysuria and frequency.   Musculoskeletal:  Negative for back pain and falls.   Skin:  Negative for rash.   Neurological:  Positive for headaches. Negative for seizures and loss of consciousness.   Psychiatric/Behavioral:  Negative for hallucinations " and substance abuse.      Past Medical History   has no past medical history on file.    Surgical History   has a past surgical history that includes other surgical procedure; tonsillectomy (7/9/08); ethmoidectomy (7/9/08); antrostomy (7/9/08); and turbinoplasty (7/9/08).     Family History  family history is not on file.   Family history reviewed with patient. There is no family history that is pertinent to the chief complaint.     Social History   reports that she has never smoked. She has never used smokeless tobacco. She reports that she does not currently use alcohol. She reports that she does not use drugs.    Allergies  Allergies   Allergen Reactions    Penicillins Hives     Family member states reaction occurred in childhood.       Medications  Prior to Admission Medications   Prescriptions Last Dose Informant Patient Reported? Taking?   acetaminophen (TYLENOL) 500 MG Tab 9/11/2022 at 13:00 Significant Other Yes No   Sig: Take 1,000 mg by mouth 1 time a day as needed. Indications: Pain   ibuprofen (MOTRIN) 200 MG Tab 9/11/2022 at 06:00 Significant Other Yes No   Sig: Take 400 mg by mouth every 6 hours as needed. Indications: Pain   levetiracetam (KEPPRA) 1000 MG tablet 9/11/2022 at 06:00 Significant Other No No   Sig: Take 1 Tablet by mouth 2 times a day.   ondansetron (ZOFRAN ODT) 4 MG TABLET DISPERSIBLE 9/11/2022 at 13:00 Significant Other No No   Sig: Take 1 Tablet by mouth every 6 hours as needed for Nausea.      Facility-Administered Medications: None       Physical Exam  Temp:  [36.1 °C (96.9 °F)] 36.1 °C (96.9 °F)  Pulse:  [60-84] 60  Resp:  [18-20] 18  BP: (121-126)/(69-84) 126/69  SpO2:  [95 %-100 %] 100 %  Blood Pressure: 121/84   Temperature: 36.1 °C (96.9 °F)   Pulse: 84   Respiration: 20   Pulse Oximetry: 98 %       Physical Exam  Vitals and nursing note reviewed.   Constitutional:       General: She is not in acute distress.     Appearance: She is ill-appearing. She is not toxic-appearing.    HENT:      Head: Normocephalic.      Right Ear: External ear normal.      Left Ear: External ear normal.      Nose: No congestion.      Mouth/Throat:      Mouth: Mucous membranes are moist.      Pharynx: No oropharyngeal exudate.   Eyes:      General: No scleral icterus.     Pupils: Pupils are equal, round, and reactive to light.   Cardiovascular:      Rate and Rhythm: Normal rate and regular rhythm.      Heart sounds: No murmur heard.  Pulmonary:      Breath sounds: No wheezing.   Abdominal:      Palpations: Abdomen is soft.      Tenderness: There is no abdominal tenderness. There is no guarding or rebound.   Musculoskeletal:         General: No swelling or deformity.      Cervical back: Neck supple. No rigidity or tenderness.      Comments: Lumbar puncture site is well-healed, dressing is dry, no surrounding erythema   Skin:     Coloration: Skin is not jaundiced.      Findings: No bruising.   Neurological:      General: No focal deficit present.      Mental Status: She is alert.      Cranial Nerves: No cranial nerve deficit.      Comments: A&O x0 (waxing and waning mentation)  No pronator drift  No dysdiadochokinesia  Non verbal   Psychiatric:         Mood and Affect: Mood normal.         Behavior: Behavior normal.       Laboratory:  Recent Labs     09/11/22  1344   WBC 5.5   RBC 5.38   HEMOGLOBIN 16.7*   HEMATOCRIT 46.1   MCV 85.7   MCH 31.0   MCHC 36.2*   RDW 37.2   PLATELETCT 203   MPV 10.5     Recent Labs     09/11/22  1344   SODIUM 139   POTASSIUM 3.6   CHLORIDE 103   CO2 20   GLUCOSE 85   BUN 14   CREATININE 0.84   CALCIUM 9.6     Recent Labs     09/11/22  1344   GLUCOSE 85         No results for input(s): NTPROBNP in the last 72 hours.      No results for input(s): TROPONINT in the last 72 hours.    Imaging:  DX-CHEST-LIMITED (1 VIEW)   Final Result      No radiographic evidence of acute cardiopulmonary process.      CT-HEAD W/O    (Results Pending)       Assessment/Plan:  Justification for Admission  Status  I anticipate this patient is appropriate for observation status at this time because encephalitis    Patient will need a  bed on EMERGENCY service .  The need is secondary to encephalitis.    * Encephalopathy- (present on admission)  Assessment & Plan  Patient recently hospitalized 9/6-9/9 with new onset seizure  Suspected to be viral encephalitis versus aseptic meningitis  Patient was seen by neuro and placed on Keppra BID  Patient now presents with waxing and waning mentation - non verbal, follows commands, moves all extremities   Possibly aseptic meningitis in setting of recent lumbar puncture, could be precipitated by NSAID use  No leukocytosis, afebrile no indication for antibiotic or antiviral treatment at this time  CT head ordered  EEG ordered    Seizure (HCC)- (present on admission)  Assessment & Plan  Recent history of  Continue Keppra        VTE prophylaxis: SCDs/TEDs

## 2022-09-11 NOTE — ED NOTES
Break RN: This RN called to room by . Patient tearful with cramps in hands. Patient unable to answer orientation questions due to tearfulness. Vital signs stable. Patient to CT.

## 2022-09-11 NOTE — ASSESSMENT & PLAN NOTE
Patient recently hospitalized 9/6-9/9 with new onset seizure  Suspected to be viral encephalitis versus aseptic meningitis  Patient was seen by neuro and placed on Keppra BID  Patient now presents with waxing and waning mentation - non verbal, follows commands, moves all extremities   Possibly aseptic meningitis in setting of recent lumbar puncture, could be precipitated by NSAID use  No leukocytosis, afebrile no indication for antibiotic or antiviral treatment at this time  CT head ordered  EEG ordered

## 2022-09-12 VITALS
HEART RATE: 77 BPM | SYSTOLIC BLOOD PRESSURE: 122 MMHG | BODY MASS INDEX: 28.23 KG/M2 | HEIGHT: 64 IN | WEIGHT: 165.34 LBS | RESPIRATION RATE: 16 BRPM | OXYGEN SATURATION: 94 % | TEMPERATURE: 98.7 F | DIASTOLIC BLOOD PRESSURE: 74 MMHG

## 2022-09-12 PROBLEM — R56.9 SEIZURE (HCC): Status: RESOLVED | Noted: 2022-09-06 | Resolved: 2022-09-12

## 2022-09-12 LAB
ALBUMIN SERPL BCP-MCNC: 4 G/DL (ref 3.2–4.9)
ALBUMIN/GLOB SERPL: 2 G/DL
ALP SERPL-CCNC: 57 U/L (ref 30–99)
ALT SERPL-CCNC: 11 U/L (ref 2–50)
ANION GAP SERPL CALC-SCNC: 11 MMOL/L (ref 7–16)
AST SERPL-CCNC: 15 U/L (ref 12–45)
BASOPHILS # BLD AUTO: 0.6 % (ref 0–1.8)
BASOPHILS # BLD: 0.04 K/UL (ref 0–0.12)
BILIRUB SERPL-MCNC: 0.6 MG/DL (ref 0.1–1.5)
BUN SERPL-MCNC: 13 MG/DL (ref 8–22)
CALCIUM SERPL-MCNC: 8.4 MG/DL (ref 8.5–10.5)
CHLORIDE SERPL-SCNC: 107 MMOL/L (ref 96–112)
CO2 SERPL-SCNC: 17 MMOL/L (ref 20–33)
CREAT SERPL-MCNC: 0.59 MG/DL (ref 0.5–1.4)
EOSINOPHIL # BLD AUTO: 0.08 K/UL (ref 0–0.51)
EOSINOPHIL NFR BLD: 1.1 % (ref 0–6.9)
ERYTHROCYTE [DISTWIDTH] IN BLOOD BY AUTOMATED COUNT: 37.6 FL (ref 35.9–50)
GFR SERPLBLD CREATININE-BSD FMLA CKD-EPI: 123 ML/MIN/1.73 M 2
GLOBULIN SER CALC-MCNC: 2 G/DL (ref 1.9–3.5)
GLUCOSE SERPL-MCNC: 88 MG/DL (ref 65–99)
HCT VFR BLD AUTO: 38.6 % (ref 37–47)
HGB BLD-MCNC: 13.7 G/DL (ref 12–16)
IMM GRANULOCYTES # BLD AUTO: 0.03 K/UL (ref 0–0.11)
IMM GRANULOCYTES NFR BLD AUTO: 0.4 % (ref 0–0.9)
LYMPHOCYTES # BLD AUTO: 1.89 K/UL (ref 1–4.8)
LYMPHOCYTES NFR BLD: 26.5 % (ref 22–41)
MAGNESIUM SERPL-MCNC: 1.9 MG/DL (ref 1.5–2.5)
MCH RBC QN AUTO: 30.8 PG (ref 27–33)
MCHC RBC AUTO-ENTMCNC: 35.5 G/DL (ref 33.6–35)
MCV RBC AUTO: 86.7 FL (ref 81.4–97.8)
MONOCYTES # BLD AUTO: 0.43 K/UL (ref 0–0.85)
MONOCYTES NFR BLD AUTO: 6 % (ref 0–13.4)
NEUTROPHILS # BLD AUTO: 4.65 K/UL (ref 2–7.15)
NEUTROPHILS NFR BLD: 65.4 % (ref 44–72)
NRBC # BLD AUTO: 0 K/UL
NRBC BLD-RTO: 0 /100 WBC
PHOSPHATE SERPL-MCNC: 3.6 MG/DL (ref 2.5–4.5)
PLATELET # BLD AUTO: 184 K/UL (ref 164–446)
PMV BLD AUTO: 11.2 FL (ref 9–12.9)
POTASSIUM SERPL-SCNC: 3.7 MMOL/L (ref 3.6–5.5)
PROT SERPL-MCNC: 6 G/DL (ref 6–8.2)
RBC # BLD AUTO: 4.45 M/UL (ref 4.2–5.4)
SODIUM SERPL-SCNC: 135 MMOL/L (ref 135–145)
WBC # BLD AUTO: 7.1 K/UL (ref 4.8–10.8)

## 2022-09-12 PROCEDURE — 95819 EEG AWAKE AND ASLEEP: CPT | Mod: 26 | Performed by: STUDENT IN AN ORGANIZED HEALTH CARE EDUCATION/TRAINING PROGRAM

## 2022-09-12 PROCEDURE — 80053 COMPREHEN METABOLIC PANEL: CPT

## 2022-09-12 PROCEDURE — 700102 HCHG RX REV CODE 250 W/ 637 OVERRIDE(OP): Performed by: INTERNAL MEDICINE

## 2022-09-12 PROCEDURE — 85025 COMPLETE CBC W/AUTO DIFF WBC: CPT

## 2022-09-12 PROCEDURE — G0378 HOSPITAL OBSERVATION PER HR: HCPCS

## 2022-09-12 PROCEDURE — 84100 ASSAY OF PHOSPHORUS: CPT

## 2022-09-12 PROCEDURE — 95819 EEG AWAKE AND ASLEEP: CPT | Performed by: STUDENT IN AN ORGANIZED HEALTH CARE EDUCATION/TRAINING PROGRAM

## 2022-09-12 PROCEDURE — 99217 PR OBSERVATION CARE DISCHARGE: CPT | Performed by: NURSE PRACTITIONER

## 2022-09-12 PROCEDURE — A9270 NON-COVERED ITEM OR SERVICE: HCPCS | Performed by: INTERNAL MEDICINE

## 2022-09-12 PROCEDURE — 83735 ASSAY OF MAGNESIUM: CPT

## 2022-09-12 RX ORDER — CALCIUM CARBONATE 500(1250)
500 TABLET ORAL 2 TIMES DAILY WITH MEALS
Status: DISCONTINUED | OUTPATIENT
Start: 2022-09-12 | End: 2022-09-12 | Stop reason: HOSPADM

## 2022-09-12 RX ORDER — CALCIUM CARBONATE 500(1250)
500 TABLET ORAL 2 TIMES DAILY WITH MEALS
Qty: 4 TABLET | Refills: 0 | Status: SHIPPED | OUTPATIENT
Start: 2022-09-12 | End: 2022-09-14

## 2022-09-12 RX ORDER — AMOXICILLIN 250 MG
2 CAPSULE ORAL 2 TIMES DAILY
Qty: 30 TABLET | Refills: 0 | Status: SHIPPED | OUTPATIENT
Start: 2022-09-12 | End: 2023-07-17

## 2022-09-12 RX ORDER — TRAMADOL HYDROCHLORIDE 50 MG/1
50 TABLET ORAL EVERY 6 HOURS PRN
Qty: 20 TABLET | Refills: 0 | Status: SHIPPED | OUTPATIENT
Start: 2022-09-12 | End: 2022-09-17

## 2022-09-12 RX ADMIN — TRAMADOL HYDROCHLORIDE 50 MG: 50 TABLET, COATED ORAL at 09:57

## 2022-09-12 RX ADMIN — LEVETIRACETAM 1000 MG: 500 TABLET, FILM COATED ORAL at 05:40

## 2022-09-12 ASSESSMENT — PAIN DESCRIPTION - PAIN TYPE: TYPE: ACUTE PAIN

## 2022-09-12 NOTE — ED NOTES
called this RN into room.  states that pt's headache is worse. Asked pt if she is in pain, pt nodded head yes. Gave pt tylenol per MAR. Hospital bed ordered for pt.

## 2022-09-12 NOTE — DISCHARGE SUMMARY
"Discharge Summary    CHIEF COMPLAINT ON ADMISSION  Chief Complaint   Patient presents with    Headache     Pt admitted for viral meningitis x 4 days, discharged on Sept 9. Woke up with no headache today, but started having a bad headache 1 hour ago. Pt alert but answering \"I don't know\" to A&O questions. Mild nausea, neck supple, no fevers. Pt tearful, not answering specific questions, unable to directly answer whether headache is better or worse that past headaches. Took 1g tylenol & 4mg zofran 1 hour ago.       Reason for Admission  headache     Admission Date  9/11/2022    CODE STATUS  Full Code    HPI & HOSPITAL COURSE  Celestine Calderón is a 32 y.o. female who presented 9/11/2022 with headache, confusion.  Recently admitted 9/6-9/9 for new onset seizure, encephalitis.  Lumbar puncture was negative for West Nile, HSV, VSV.  Patient was discharged on keppra BID after improvement in headaches and return to baseline mentation.  Over the last 48 hours while at home patient has had waxing and waning headaches as well as changes in mentation.  Per H&P, upon presentation to the ED patient was oriented x1 and did not recognize her  at bedside.    The patient is lying in bed, fully alert and oriented.   at bedside.  The patient does not appear to be in acute distress.  She does endorse lethargy which began after her initial seizure episode.  She reports her headaches have resolved, tramadol has been helpful.   reports the patient eats very little.  The patient endorses a poor appetite.    -Vital signs within normal limits  -Mild hypocalcemia, repleted p.o. otherwise electrolytes within normal limits  -CT head negative for acute abnormalities  -EEG shows encephalopathy, however negative for seizure activity  -Refer to primary care for continued work-up.  Discussed with Dr. Morris, neurology, no further inpatient work-up indicated at this time.  -Supportive care    Therefore, she is discharged in good and " stable condition to home with close outpatient follow-up.    The patient recovered much more quickly than anticipated on admission.    Discharge Date  09/12/22    FOLLOW UP ITEMS POST DISCHARGE  Rest, nutrition, Primary care    DISCHARGE DIAGNOSES  Principal Problem:    Encephalopathy POA: Yes  Active Problems:    Seizure (HCC) POA: Yes  Resolved Problems:    * No resolved hospital problems. *      FOLLOW UP  No future appointments.  Adilene Cho P.A.-C.  [address]  [facility] [id number] [phone]    Schedule an appointment as soon as possible for a visit in 1 week(s)      MEDICATIONS ON DISCHARGE     Medication List        START taking these medications        Instructions   calcium carbonate 500 MG Tabs  Commonly known as: OS-ISIDRO 500   Take 1 Tablet by mouth 2 times a day with meals for 2 days.  Dose: 500 mg     senna-docusate 8.6-50 MG Tabs  Commonly known as: PERICOLACE or SENOKOT S   Take 2 Tablets by mouth 2 times a day.  Dose: 2 Tablet     traMADol 50 MG Tabs  Commonly known as: Ultram   Take 1 Tablet by mouth every 6 hours as needed for Moderate Pain or Severe Pain for up to 5 days.  Dose: 50 mg            CONTINUE taking these medications        Instructions   acetaminophen 500 MG Tabs  Commonly known as: TYLENOL   Take 1,000 mg by mouth 1 time a day as needed. Indications: Pain  Dose: 1,000 mg     ibuprofen 200 MG Tabs  Commonly known as: MOTRIN   Take 400 mg by mouth every 6 hours as needed. Indications: Pain  Dose: 400 mg     levetiracetam 1000 MG tablet  Commonly known as: KEPPRA   Take 1 Tablet by mouth 2 times a day.  Dose: 1,000 mg     ondansetron 4 MG Tbdp  Commonly known as: Zofran ODT   Take 1 Tablet by mouth every 6 hours as needed for Nausea.  Dose: 4 mg              Allergies  Allergies   Allergen Reactions    Penicillins Hives     Family member states reaction occurred in childhood.       DIET  Orders Placed This Encounter   Procedures    Diet Order Diet: Regular      Standing Status:   Standing     Number of Occurrences:   1     Order Specific Question:   Diet:     Answer:   Regular [1]       ACTIVITY  As tolerated.  Weight bearing as tolerated    CONSULTATIONS  Neurology    PROCEDURES  none    LABORATORY  Lab Results   Component Value Date    SODIUM 135 09/12/2022    POTASSIUM 3.7 09/12/2022    CHLORIDE 107 09/12/2022    CO2 17 (L) 09/12/2022    GLUCOSE 88 09/12/2022    BUN 13 09/12/2022    CREATININE 0.59 09/12/2022    CREATININE 0.7 02/24/2006        Lab Results   Component Value Date    WBC 7.1 09/12/2022    HEMOGLOBIN 13.7 09/12/2022    HEMATOCRIT 38.6 09/12/2022    PLATELETCT 184 09/12/2022              CT-HEAD W/O  Narrative: 9/11/2022 4:23 PM    HISTORY/REASON FOR EXAM:  Memory Loss.  Waxing and waning headaches, confusion, oriented x1    TECHNIQUE/EXAM DESCRIPTION AND NUMBER OF VIEWS:    CT of the head without contrast.    Contiguous 5 mm axial sections were obtained from the skull base through the vertex.    Up to date radiation dose reduction adjustments have been utilized to meet ALARA standards for radiation dose reduction.    COMPARISON:   9/5/2022.    FINDINGS:  No acute intracranial hemorrhage is seen.    There is no midline shift.    Ventricles are normal in size and configuration.    Gray white junction differentiation is distinct.    Visualized paranasal sinuses and mastoid air cells are clear.    No acute calvarium abnormality is noted.  Impression: No acute intracranial abnormality is identified.  DX-CHEST-LIMITED (1 VIEW)  Narrative: 9/11/2022 3:07 PM    HISTORY/REASON FOR EXAM: Shortness of Breath.    TECHNIQUE/EXAM DESCRIPTION AND NUMBER OF VIEWS:  Single portable view of the chest.    COMPARISON: September 6.    FINDINGS:  Cardiomediastinal contours are normal.    Lungs demonstrate no consolidation.    No pneumothorax is seen.  Impression: No radiographic evidence of acute cardiopulmonary process.      Total time of the discharge process exceeds 31  josette.    JOSE Reveles.

## 2022-09-12 NOTE — PROGRESS NOTES
4 Eyes Skin Assessment Completed by ARABELLA Sanders and Rod RN.    Head WDL  Ears WDL  Nose WDL  Mouth WDL  Neck WDL  Breast/Chest WDL  Shoulder Blades WDL  Spine WDL  (R) Arm/Elbow/Hand WDL  (L) Arm/Elbow/Hand WDL  Abdomen WDL  Groin WDL  Scrotum/Coccyx/Buttocks WDL  (R) Leg WDL  (L) Leg WDL  (R) Heel/Foot/Toe WDL  (L) Heel/Foot/Toe WDL          Devices In Places Tele Box, Blood Pressure Cuff, and Pulse Ox      Interventions In Place N/A    Possible Skin Injury No    Pictures Uploaded Into Epic N/A  Wound Consult Placed N/A  RN Wound Prevention Protocol Ordered No

## 2022-09-12 NOTE — PROCEDURES
INPATIENT ROUTINE VIDEO ELECTROENCEPHALOGRAM REPORT      Referring provider:   Alexis    DOS: 09/12/22 (0 hours and 32 minutes of total recording time).     INDICATION:  Celestine Calderón 32 y.o. female presenting with altered mental status    CURRENT ANTIEPILEPTIC AND/OR SEDATING REGIMEN:   Keppra    TECHNIQUE: Routine VEEG was set up by a Neurodiagnostic technologist who performed education to the patient and staff. A minimum of 23 electrodes and 23 channel recording was setup and performed by Neurodiagnostic technologist, in accordance with the international 10-20 system. The study was reviewed in bipolar and referential montages. The recording examined the patient in the  awake, drowsy, and sleep state(s).     DESCRIPTION OF THE RECORD:  During maximal wakefulness, the background was continuous and showed a 6-7 Hz posterior dominant rhythm, with a mixture of theta and delta activity .  Reactivity and state changes were present .  During drowsiness, theta/delta frequencies were seen.     EEG Sleep: Occasional N2 sleep transients in the form of rudimentary sleep spindles fragments and vertex waves were seen in the leads over the central regions.     ACTIVATION PROCEDURES:   NA    ICTAL AND INTERICTAL FINDINGS:   Intermittent runs of generalized rhythmic 1.5 to 2.5 Hz rhythmic or quasirhythmic slowing was noted, often asymmetrical and dyssynchronous, however.  Occasionally generalized slowing was followed by rhythmic left temporal greater than central 1 to 2 Hz activity (TIRDA).    No definite electrographic or electroclinical seizures.     EKG: Sampling of the EKG recording did not demonstrate any abnormalities      EVENTS:  None    INTERPRETATION:   Abnormal video EEG recording in the awake, drowsy, and sleep state(s):  - Mild to moderate background slowing suggestive of diffuse/multifocal cerebral dysfunction and consistent with a nonspecific encephalopathy.  - Intermittent runs of generalized 1.5 to 2.5 Hz  rhythmic or quasirhythmic slowing was noted, often asymmetrical and dyssynchronous.  Occasionally generalized slowing was followed by rhythmic left temporal maximal l 1 to 2 Hz rhythmic activity.  These findings may reflect a mild encephalopathy as noted above.  However the dyssynchronous generalized slowing and at times focal nature of the slowing over the left temporal lobe may suggest underlying epileptogenic potential with an increased risk for seizures.  However, no seizures were seen.  Clinical and radiographic correlation recommended.  -There is ongoing concern for seizure activity consider longer-term EEG monitoring .        Willis De MD  Department of Neurology at [facility]  General Neurologist and Epileptologist  Director of [facility] Level III Comprehensive Epilepsy Program  Professor of Clinical Neurology, [facility].   Phone: [phone]  Fax: [phone]  E-mail: [email or web address]

## 2022-09-12 NOTE — ED NOTES
Assumed care of patient. Pt resting in bed- continues to complain of headache 8/10 after tramadol. Neuro exam WDL- equal strength bilaterally. Denies photophobia. PERRLA

## 2022-09-13 ENCOUNTER — OFFICE VISIT (OUTPATIENT)
Dept: MEDICAL GROUP | Facility: PHYSICIAN GROUP | Age: 32
End: 2022-09-13
Payer: COMMERCIAL

## 2022-09-13 VITALS
WEIGHT: 157 LBS | TEMPERATURE: 97.9 F | HEART RATE: 84 BPM | SYSTOLIC BLOOD PRESSURE: 110 MMHG | DIASTOLIC BLOOD PRESSURE: 78 MMHG | RESPIRATION RATE: 18 BRPM | OXYGEN SATURATION: 98 % | BODY MASS INDEX: 26.8 KG/M2 | HEIGHT: 64 IN

## 2022-09-13 DIAGNOSIS — G93.40 ENCEPHALOPATHY: ICD-10-CM

## 2022-09-13 DIAGNOSIS — A87.9 VIRAL MENINGITIS: ICD-10-CM

## 2022-09-13 PROCEDURE — 99215 OFFICE O/P EST HI 40 MIN: CPT | Performed by: PHYSICIAN ASSISTANT

## 2022-09-13 ASSESSMENT — FIBROSIS 4 INDEX: FIB4 SCORE: 0.79

## 2022-09-13 NOTE — PROGRESS NOTES
Assumed care of patient this AM. Patient A&Ox3 with a flat affect, but cooperative with care. EEG completed as ordered. PRN medications administered as indicated for pain. Patient discharged in no distress.

## 2022-09-13 NOTE — PROGRESS NOTES
Subjective:     CC: Hospital follow-up, encephalopathy, viral meningitis    HPI:   Celestine presents today for a hospital follow-up.  Briefly, patient was originally seen in the emergency room multiple times in the last week for concerns of altered level of consciousness, possible seizure activity.  She was eventually admitted to the hospital.  At that time she had a head CT that was negative for any acute issues, blood work was overall unremarkable, an EEG completed that showed no seizure activity but encephalopathy.  She was also diagnosed with viral meningitis.  Here today for follow-up and states that she has been very fatigued and with an intermittent headache.  Has not had much of an appetite and has been vomiting intermittently.      History reviewed. No pertinent past medical history.    Social History     Tobacco Use    Smoking status: Never    Smokeless tobacco: Never   Vaping Use    Vaping Use: Never used   Substance Use Topics    Alcohol use: Not Currently    Drug use: Never       Current Outpatient Medications Ordered in Epic   Medication Sig Dispense Refill    calcium carbonate (OS-ISIDRO 500) 500 MG Tab Take 1 Tablet by mouth 2 times a day with meals for 2 days. 4 Tablet 0    traMADol (ULTRAM) 50 MG Tab Take 1 Tablet by mouth every 6 hours as needed for Moderate Pain or Severe Pain for up to 5 days. 20 Tablet 0    levetiracetam (KEPPRA) 1000 MG tablet Take 1 Tablet by mouth 2 times a day. 60 Tablet 0    ondansetron (ZOFRAN ODT) 4 MG TABLET DISPERSIBLE Take 1 Tablet by mouth every 6 hours as needed for Nausea. 10 Tablet 0    acetaminophen (TYLENOL) 500 MG Tab Take 1,000 mg by mouth 1 time a day as needed. Indications: Pain      senna-docusate (PERICOLACE OR SENOKOT S) 8.6-50 MG Tab Take 2 Tablets by mouth 2 times a day. (Patient not taking: Reported on 9/13/2022) 30 Tablet 0    ibuprofen (MOTRIN) 200 MG Tab Take 400 mg by mouth every 6 hours as needed. Indications: Pain (Patient not taking: Reported on  "9/13/2022)       No current Epic-ordered facility-administered medications on file.       Allergies:  Penicillins    Health Maintenance: Completed    ROS:  Gen: no fevers/chills  Eyes: no changes in vision  ENT: no sore throat  Pulm: no sob, no cough  CV: no chest pain  GI: Positive for nausea/vomiting  : no dysuria  MSk: no myalgias  Skin: no rash  Neuro: Positive for headaches  Psych: no depression, no anxiety      Objective:       Exam:  /78   Pulse 84   Temp 36.6 °C (97.9 °F) (Temporal)   Resp 18   Ht 1.626 m (5' 4\")   Wt 71.2 kg (157 lb)   LMP 08/21/2022 (Approximate)   SpO2 98%   Breastfeeding No   BMI 26.95 kg/m²  Body mass index is 26.95 kg/m².    Gen: Alert and oriented x 4, Appears ill, laying down in the chair but able to converse with me when specifically asked  Skin: Warm, dry, good turgor, no rashes in visible areas.  HEENT: Normocephalic. Eyes conjunctiva clear lids without ptosis, pupils equal and reactive to light accommodation, ears normal shape and contour  Neck: Trachea midline, no masses, no thyromegaly  Lungs: Normal effort, CTA bilaterally, no wheezes, rhonchi, or rales  CV: Regular rate and rhythm. No murmurs, rubs, or gallops.  MSK: Normal gait, moves all extremities.  Neuro: CN II-XII intact, strength 5/5 in all muscle groups, sensation intact bilaterally, reflexes 2+ bilaterally, coordination intact bilaterally, Romberg negative, pronator drift negative  Ext: No clubbing, cyanosis, edema.  Psych: Alert and oriented x 4    Labs: Labs from 9/6-9/7 and 9/11/2022 were reviewed and discussed with the patient. All questions were answered.     Imaging:  MRI brain from 9/6/2022    IMPRESSION:    Edema of the left medial temporal lobe and hippocampus noted involving the cortex and adjacent white matter. This extends to the anterior medial temporal cortex as well. Differential diagnosis includes encephalitis (herpes and atypical encephalitides),   inflammatory/autoimmune conditions " such as limbic encephalitis.    Assessment & Plan:     32 y.o. female with the following -     1. Viral meningitis  Patient was recently discharged from the hospital.  On exam today, her vitals are within normal limits and her neurological exam is normal, over the patient does look like she is in some degree of distress and has to lay back for majority of the exam because she states she is fatigued.   states that when they were taken back to the emergency room he witnessed full convulsions in the car and was very concerned about a seizure.  She did have an EEG completed while she was in the hospital that was negative for seizure activity but showed encephalopathy.  He denies any potential seizure activity since then.  Lumbar puncture showed elevated white blood cell count and total protein she was placed on Keppra while in the hospital and is supposed to continue with it for 3 to 6 months until discontinued by neurology.  Has been is very frustrated because they have had multiple trips back to the hospital and him were not happy with the care that they received.  Did discuss that given her clinical presentation, I do have some concerns for the amount of distress she appears to be in so formally recommended that she would go back to the emergency room for further evaluation given her continued vomiting and headache, however patient and her  declined.  Her  is adamant that this is the appearance she was in when she was discharged from the hospital and if anything she looks somewhat improved today.    gave majority of the history, however when I specifically asked questions to her she was very reasonable and was able to answer all of my questions appropriately.  She was recently discharged but did discuss that some degree of fatigue is not uncommon after being discharged from the hospital, however given her atypical presentation would have a low threshold to go back to the emergency room.   Also discussed possibility that it could be a side effect to starting Keppra as well as normal recovery from having encephalitis.  I did discuss this case in detail with my supervising physician who recommended a stat referral to neurology and infectious disease for further evaluation and management which I have placed today.  Unfortunately they are not able to see our neurology team given her insurance coverage, but will call to try to get her an appointment this week with another neurologist in town.   - Referral to Neurology  - Referral to Infectious Disease    2. Encephalopathy  - Referral to Neurology  - Referral to Infectious Disease    I spent a total of 45 minutes with record review (including external notes and labs), exam, communication with the patient, communication with other providers, and documentation of this encounter.     Return for After specialist.    Please note that this dictation was created using voice recognition software. I have made every reasonable attempt to correct obvious errors, but I expect that there are errors of grammar and possibly content that I did not discover before finalizing the note.    Electronically signed by Adilene Cho PA-C on September 13, 2022

## 2022-09-16 ENCOUNTER — HOSPITAL ENCOUNTER (OUTPATIENT)
Dept: LAB | Facility: MEDICAL CENTER | Age: 32
End: 2022-09-16
Attending: PSYCHIATRY & NEUROLOGY
Payer: COMMERCIAL

## 2022-09-16 PROCEDURE — 84443 ASSAY THYROID STIM HORMONE: CPT

## 2022-09-16 PROCEDURE — 36415 COLL VENOUS BLD VENIPUNCTURE: CPT

## 2022-09-16 PROCEDURE — 86038 ANTINUCLEAR ANTIBODIES: CPT

## 2022-09-16 PROCEDURE — 86431 RHEUMATOID FACTOR QUANT: CPT

## 2022-09-16 PROCEDURE — 84432 ASSAY OF THYROGLOBULIN: CPT

## 2022-09-16 PROCEDURE — 86800 THYROGLOBULIN ANTIBODY: CPT

## 2022-09-16 PROCEDURE — 82607 VITAMIN B-12: CPT

## 2022-09-16 PROCEDURE — 86235 NUCLEAR ANTIGEN ANTIBODY: CPT

## 2022-09-16 PROCEDURE — 86341 ISLET CELL ANTIBODY: CPT

## 2022-09-16 PROCEDURE — 86376 MICROSOMAL ANTIBODY EACH: CPT

## 2022-09-17 LAB
RHEUMATOID FACT SER IA-ACNC: <10 IU/ML (ref 0–14)
THYROPEROXIDASE AB SERPL-ACNC: 9 IU/ML (ref 0–9)
TSH SERPL DL<=0.005 MIU/L-ACNC: 0.42 UIU/ML (ref 0.38–5.33)
VIT B12 SERPL-MCNC: 1250 PG/ML (ref 211–911)

## 2022-09-20 ENCOUNTER — APPOINTMENT (OUTPATIENT)
Dept: LAB | Facility: MEDICAL CENTER | Age: 32
End: 2022-09-20
Payer: COMMERCIAL

## 2022-09-20 LAB
ENA SS-B IGG SER IA-ACNC: 0 AU/ML (ref 0–40)
NUCLEAR IGG SER QL IA: NORMAL
SSA52 R0ENA AB IGG Q0420: 0 AU/ML (ref 0–40)
SSA60 R0ENA AB IGG Q0419: 0 AU/ML (ref 0–40)
THYROGLOB AB SERPL-ACNC: <0.9 IU/ML (ref 0–4)
THYROGLOB SERPL-MCNC: 14 NG/ML (ref 1.3–31.8)
THYROGLOB SERPL-MCNC: NORMAL NG/ML (ref 1.3–31.8)

## 2022-09-21 LAB — GAD65 AB SER IA-ACNC: <5 IU/ML (ref 0–5)

## 2022-10-03 ENCOUNTER — TELEPHONE (OUTPATIENT)
Dept: MEDICAL GROUP | Facility: PHYSICIAN GROUP | Age: 32
End: 2022-10-03
Payer: COMMERCIAL

## 2022-10-03 NOTE — TELEPHONE ENCOUNTER
Pt called on September 29th and LVM stating if she still need to see infectious disease as she not sure if Adilene Cho PA-C has put in the referral.     Called pt to let her know that she should ask her neurologist if she should still see infectious disease per her symptoms. Pt understood.     Pt also requesting a new neurology referral to get a second opinions once she find a neurologist. Let pt know to let us know who the provider is and we will send one in for pt.

## 2023-02-12 ENCOUNTER — HOSPITAL ENCOUNTER (OUTPATIENT)
Dept: RADIOLOGY | Facility: MEDICAL CENTER | Age: 33
End: 2023-02-12
Attending: PSYCHIATRY & NEUROLOGY
Payer: COMMERCIAL

## 2023-02-12 DIAGNOSIS — G40.211 LOCALIZATION-RELATED SYMPTOMATIC EPILEPSY AND EPILEPTIC SYNDROMES WITH COMPLEX PARTIAL SEIZURES, INTRACTABLE, WITH STATUS EPILEPTICUS (HCC): ICD-10-CM

## 2023-02-12 DIAGNOSIS — G04.81 OTHER ENCEPHALITIS AND ENCEPHALOMYELITIS: ICD-10-CM

## 2023-02-12 PROCEDURE — A9579 GAD-BASE MR CONTRAST NOS,1ML: HCPCS | Performed by: PSYCHIATRY & NEUROLOGY

## 2023-02-12 PROCEDURE — 70553 MRI BRAIN STEM W/O & W/DYE: CPT

## 2023-02-12 PROCEDURE — 700117 HCHG RX CONTRAST REV CODE 255: Performed by: PSYCHIATRY & NEUROLOGY

## 2023-02-12 RX ADMIN — GADOTERIDOL 15 ML: 279.3 INJECTION, SOLUTION INTRAVENOUS at 10:27

## 2023-02-14 NOTE — CARE PLAN
Problem: Communication  Goal: The ability to communicate needs accurately and effectively will improve  Outcome: MET     Problem: Safety  Goal: Will remain free from injury  Outcome: MET  Goal: Will remain free from falls  Outcome: MET     Problem: Infection  Goal: Will remain free from infection  Outcome: MET     Problem: Venous Thromboembolism (VTW)/Deep Vein Thrombosis (DVT) Prevention:  Goal: Patient will participate in Venous Thrombosis (VTE)/Deep Vein Thrombosis (DVT)Prevention Measures  Outcome: MET     Problem: Bowel/Gastric:  Goal: Normal bowel function is maintained or improved  Outcome: MET  Goal: Will not experience complications related to bowel motility  Outcome: MET     Problem: Knowledge Deficit  Goal: Knowledge of disease process/condition, treatment plan, diagnostic tests, and medications will improve  Outcome: MET  Goal: Knowledge of the prescribed therapeutic regimen will improve  Outcome: MET     Problem: Discharge Barriers/Planning  Goal: Patient's continuum of care needs will be met  Outcome: MET     Problem: Safety  Goal: Free from accidental injury  Outcome: MET  Goal: Free from intentional harm  Outcome: MET  Goal: Free from self harm  Outcome: MET  Goal: Isolation Precautions for patient and staff safety  Outcome: MET     Problem: Knowledge Deficit  Goal: Patient/Support person demonstrates understanding regarding the progression of labor, available options and participates in decision-making process  Outcome: MET     Problem: Psychosocial needs  Goal: Spiritual needs incorporated in hospitalization  Outcome: MET  Goal: Cultural needs incorporated in hospitalization  Outcome: MET  Goal: Anxiety reduction  Outcome: MET     Problem: Discharge Barriers/Planning  Goal: Patient's Continuum of care needs are met  Outcome: MET     Problem: Pain  Goal: Alleviation of Pain or a reduction in pain to the patient's comfort goal  Outcome: MET  Goal: Patient will have relaxed facial expressions and be  MRN [id number] able to rest between uterine contractions  Outcome: MET     Problem: Risk for Fluid Imbalance  Goal: Promotion of Fluid Balance  Outcome: MET     Problem: Risk for Infection, Impaired Wound Healing  Goal: Remain free from signs and symptoms of infection  Outcome: MET  Goal: Promotion of Wound Healing  Outcome: MET     Problem: Risk for injury  Goal: Patient and fetus will be free of preventable injury/complications  Outcome: MET  Goal: Fetus will be free of preventable trauma or other complications  Outcome: MET     Problem: Fluid Volume:  Goal: Will maintain balanced intake and output  Outcome: MET     Problem: Altered physiologic condition related to immediate post-delivery state and potential for bleeding/hemorrhage  Goal: Patient physiologically stable as evidenced by normal lochia, palpable uterine involution and vital signs within normal limits  Outcome: MET     Problem: Potential for postpartum infection related to presence of episiotomy/vaginal tear and/or uterine contamination  Goal: Patient will be absent from signs and symptoms of infection  Outcome: MET     Problem: Alteration in comfort related to episiotomy, vaginal repair and/or after birth pains  Goal: Patient is able to ambulate, care for self and infant  Outcome: MET  Goal: Patient verbalizes acceptable pain level  Outcome: MET     Problem: Potential knowledge deficit related to lack of understanding of self and  care  Goal: Patient will verbalize understanding of self and infant care  Outcome: MET  Goal: Patient will demonstrate ability to care for self and infant  Outcome: MET     Problem: Potential anxiety related to difficulty adapting to parental role  Goal: Patient will verbalize and demonstrate effective bonding and parenting behavior  Outcome: MET     Problem: Pain Management  Goal: Pain level will decrease to patient's comfort goal  Outcome: MET

## 2023-03-13 ENCOUNTER — HOSPITAL ENCOUNTER (OUTPATIENT)
Dept: LAB | Facility: MEDICAL CENTER | Age: 33
End: 2023-03-13
Attending: PSYCHIATRY & NEUROLOGY
Payer: COMMERCIAL

## 2023-03-13 LAB
APTT PPP: 27.6 SEC (ref 24.7–36)
BASOPHILS # BLD AUTO: 0.8 % (ref 0–1.8)
BASOPHILS # BLD: 0.06 K/UL (ref 0–0.12)
EOSINOPHIL # BLD AUTO: 0.15 K/UL (ref 0–0.51)
EOSINOPHIL NFR BLD: 1.9 % (ref 0–6.9)
ERYTHROCYTE [DISTWIDTH] IN BLOOD BY AUTOMATED COUNT: 42 FL (ref 35.9–50)
HCT VFR BLD AUTO: 44.3 % (ref 37–47)
HGB BLD-MCNC: 14.7 G/DL (ref 12–16)
IMM GRANULOCYTES # BLD AUTO: 0.04 K/UL (ref 0–0.11)
IMM GRANULOCYTES NFR BLD AUTO: 0.5 % (ref 0–0.9)
INR PPP: 1.07 (ref 0.87–1.13)
LYMPHOCYTES # BLD AUTO: 1.85 K/UL (ref 1–4.8)
LYMPHOCYTES NFR BLD: 23.8 % (ref 22–41)
MCH RBC QN AUTO: 30.4 PG (ref 27–33)
MCHC RBC AUTO-ENTMCNC: 33.2 G/DL (ref 33.6–35)
MCV RBC AUTO: 91.7 FL (ref 81.4–97.8)
MONOCYTES # BLD AUTO: 0.5 K/UL (ref 0–0.85)
MONOCYTES NFR BLD AUTO: 6.4 % (ref 0–13.4)
NEUTROPHILS # BLD AUTO: 5.18 K/UL (ref 2–7.15)
NEUTROPHILS NFR BLD: 66.6 % (ref 44–72)
NRBC # BLD AUTO: 0 K/UL
NRBC BLD-RTO: 0 /100 WBC
PLATELET # BLD AUTO: 168 K/UL (ref 164–446)
PMV BLD AUTO: 12 FL (ref 9–12.9)
PROTHROMBIN TIME: 13.8 SEC (ref 12–14.6)
RBC # BLD AUTO: 4.83 M/UL (ref 4.2–5.4)
WBC # BLD AUTO: 7.8 K/UL (ref 4.8–10.8)

## 2023-03-13 PROCEDURE — 85730 THROMBOPLASTIN TIME PARTIAL: CPT

## 2023-03-13 PROCEDURE — 85025 COMPLETE CBC W/AUTO DIFF WBC: CPT

## 2023-03-13 PROCEDURE — 36415 COLL VENOUS BLD VENIPUNCTURE: CPT

## 2023-03-13 PROCEDURE — 85610 PROTHROMBIN TIME: CPT

## 2023-03-20 ENCOUNTER — HOSPITAL ENCOUNTER (OUTPATIENT)
Dept: RADIOLOGY | Facility: MEDICAL CENTER | Age: 33
End: 2023-03-20
Attending: PSYCHIATRY & NEUROLOGY
Payer: COMMERCIAL

## 2023-03-20 ENCOUNTER — HOSPITAL ENCOUNTER (OUTPATIENT)
Facility: MEDICAL CENTER | Age: 33
End: 2023-03-20
Attending: PSYCHIATRY & NEUROLOGY
Payer: COMMERCIAL

## 2023-03-20 DIAGNOSIS — G04.81 BORRIES' SYNDROME: ICD-10-CM

## 2023-03-20 DIAGNOSIS — G40.211 LOCALZ-RLTED SYMPTOMATIC EPILEPSY W CMPLX PARTL SZ, INTRACT, W STATUS (HCC): ICD-10-CM

## 2023-03-20 LAB
BURR CELLS/RBC NFR CSF MANUAL: 0 %
CLARITY CSF: CLEAR
COLOR CSF: COLORLESS
COLOR SPUN CSF: COLORLESS
GLUCOSE CSF-MCNC: 48 MG/DL (ref 40–80)
PROT CSF-MCNC: 41 MG/DL (ref 15–45)
RBC # CSF: 1 CELLS/UL
SPECIMEN VOL CSF: 8 ML
TUBE # CSF: 3
TUBE # CSF: 4
WBC # CSF: 4 CELLS/UL (ref 0–10)

## 2023-03-20 PROCEDURE — 83916 OLIGOCLONAL BANDS: CPT

## 2023-03-20 PROCEDURE — 86341 ISLET CELL ANTIBODY: CPT

## 2023-03-20 PROCEDURE — 86255 FLUORESCENT ANTIBODY SCREEN: CPT | Mod: 91

## 2023-03-20 PROCEDURE — 82945 GLUCOSE OTHER FLUID: CPT

## 2023-03-20 PROCEDURE — 77003 FLUOROGUIDE FOR SPINE INJECT: CPT

## 2023-03-20 PROCEDURE — 84157 ASSAY OF PROTEIN OTHER: CPT

## 2023-03-20 PROCEDURE — 89051 BODY FLUID CELL COUNT: CPT

## 2023-03-22 ENCOUNTER — HOSPITAL ENCOUNTER (OUTPATIENT)
Dept: RADIOLOGY | Facility: MEDICAL CENTER | Age: 33
End: 2023-03-22
Attending: RADIOLOGY
Payer: COMMERCIAL

## 2023-03-22 DIAGNOSIS — G97.1 SPINAL PUNCTURE HEADACHE: ICD-10-CM

## 2023-03-22 PROCEDURE — 700117 HCHG RX CONTRAST REV CODE 255: Performed by: RADIOLOGY

## 2023-03-22 PROCEDURE — 62273 INJECT EPIDURAL PATCH: CPT

## 2023-03-22 RX ADMIN — IOHEXOL 6 ML: 300 INJECTION, SOLUTION INTRAVENOUS at 17:45

## 2023-03-22 NOTE — PROGRESS NOTES
Pt presents to IR 2. Pt was consented by MD at bedside, confirmed by this RN and consent at bedside. Pt transferred to IR table in prone position. Patient underwent a spinal blood patch by Dr. Peralta. Procedure site was marked by MD and verified using imaging guidance. Pt placed on monitor, prepped and draped in a sterile fashion. IV removed and pt discharged through [facility] with family.

## 2023-03-23 LAB
OLIGOCLONAL BANDS CSF ELPH-IMP: ABNORMAL
OLIGOCLONAL BANDS CSF IEF: 3 BANDS (ref 0–1)
OLIGOCLONAL BANDS.IT SER+CSF QL: POSITIVE

## 2023-03-23 NOTE — OR SURGEON
Immediate Post- Operative Note        Findings: NEEDLE CONFIRMED AT L3-4 DORSAL EPIDURAL SPACE WITH EPIDUROGRAM      Procedure(s): LUMBAR EPIDUROGRAM AND SPINAL BLOOD PATCH    11 ML AUTOLOGOUS BLOOD INJECTED      Estimated Blood Loss: 12 ml (BLOOD DRAW FOR BLOOD PATCH)        Complications: None            3/22/2023     5:34 PM     Judson Peralta M.D.

## 2023-04-02 LAB — TEST NAME 95000: NORMAL

## 2023-07-17 ENCOUNTER — OFFICE VISIT (OUTPATIENT)
Dept: MEDICAL GROUP | Facility: PHYSICIAN GROUP | Age: 33
End: 2023-07-17
Payer: COMMERCIAL

## 2023-07-17 VITALS
OXYGEN SATURATION: 98 % | SYSTOLIC BLOOD PRESSURE: 106 MMHG | RESPIRATION RATE: 16 BRPM | TEMPERATURE: 98.7 F | HEART RATE: 77 BPM | DIASTOLIC BLOOD PRESSURE: 70 MMHG | BODY MASS INDEX: 25.78 KG/M2 | HEIGHT: 64 IN | WEIGHT: 151 LBS

## 2023-07-17 DIAGNOSIS — G93.40 ENCEPHALOPATHY: ICD-10-CM

## 2023-07-17 DIAGNOSIS — Z13.0 SCREENING FOR ENDOCRINE, METABOLIC AND IMMUNITY DISORDER: ICD-10-CM

## 2023-07-17 DIAGNOSIS — Z13.29 SCREENING FOR ENDOCRINE, METABOLIC AND IMMUNITY DISORDER: ICD-10-CM

## 2023-07-17 DIAGNOSIS — Z13.228 SCREENING FOR ENDOCRINE, METABOLIC AND IMMUNITY DISORDER: ICD-10-CM

## 2023-07-17 DIAGNOSIS — Z11.59 NEED FOR HEPATITIS C SCREENING TEST: ICD-10-CM

## 2023-07-17 DIAGNOSIS — L68.0 HIRSUTISM: ICD-10-CM

## 2023-07-17 PROBLEM — R52 PAIN RELATED TO VAGINAL DELIVERY: Status: RESOLVED | Noted: 2020-11-24 | Resolved: 2023-07-17

## 2023-07-17 PROCEDURE — 99214 OFFICE O/P EST MOD 30 MIN: CPT | Performed by: PHYSICIAN ASSISTANT

## 2023-07-17 PROCEDURE — 3074F SYST BP LT 130 MM HG: CPT | Performed by: PHYSICIAN ASSISTANT

## 2023-07-17 PROCEDURE — 3078F DIAST BP <80 MM HG: CPT | Performed by: PHYSICIAN ASSISTANT

## 2023-07-17 RX ORDER — PREGABALIN 100 MG/1
100 CAPSULE ORAL 2 TIMES DAILY
COMMUNITY
End: 2023-12-12

## 2023-07-17 ASSESSMENT — FIBROSIS 4 INDEX: FIB4 SCORE: 0.86

## 2023-07-17 ASSESSMENT — PATIENT HEALTH QUESTIONNAIRE - PHQ9: CLINICAL INTERPRETATION OF PHQ2 SCORE: 0

## 2023-07-17 NOTE — PROGRESS NOTES
"Subjective:     CC: Encephalopathy    HPI:   Celestine presents today requesting lab work, particularly autoimmune work-up/hormone check.  Patient has been seeing her neurologist regularly after being diagnosed with viral meningitis and encephalopathy back in September.  Patient is currently on Keppra 1000 mg 3 times daily as well as Lyrica 100 mg twice daily.  Been feeling improved on the medication but still does not have an underlying diagnosis yet.  States her neurologist thinks it may be related to an autoimmune issue.     History reviewed. No pertinent past medical history.    Social History     Tobacco Use    Smoking status: Never    Smokeless tobacco: Never   Vaping Use    Vaping Use: Never used   Substance Use Topics    Alcohol use: Not Currently    Drug use: Never       Current Outpatient Medications Ordered in Epic   Medication Sig Dispense Refill    pregabalin (LYRICA) 100 MG Cap Take 100 mg by mouth 2 times a day.      levetiracetam (KEPPRA) 1000 MG tablet Take 1 Tablet by mouth 2 times a day. (Patient taking differently: Take 1,000 mg by mouth 3 times a day.) 60 Tablet 0     No current Epic-ordered facility-administered medications on file.       Allergies:  Penicillins and Penicillin g    Health Maintenance: Completed    ROS:  Gen: no fevers/chills  Eyes: no changes in vision  ENT: no sore throat  Pulm: no sob, no cough  CV: no chest pain  GI: no nausea/vomiting  : no dysuria  MSk: no myalgias  Skin: no rash    Objective:     Exam:  /70   Pulse 77   Temp 37.1 °C (98.7 °F) (Temporal)   Resp 16   Ht 1.626 m (5' 4\")   Wt 68.5 kg (151 lb)   SpO2 98%   BMI 25.92 kg/m²  Body mass index is 25.92 kg/m².    Constitutional: Alert, no distress, well-groomed.  Skin: Warm, dry, good turgor, no rashes in visible areas.  Eye: Equal, round and reactive, conjunctiva clear, lids normal.  ENMT: Lips without lesions, good dentition, moist mucous membranes.  Neck: Trachea midline, no masses, no " thyromegaly.  Respiratory: Unlabored respiratory effort, no cough.  MSK: Normal gait, moves all extremities.  Neuro: Grossly non-focal.   Psych: Alert and oriented x3, normal affect and mood.    Labs: Labs from 3/20/2023, 9/21/2022 were reviewed and discussed with the patient.    Imaging:   MR brain from 2/12/2023  IMPRESSION:     There is abnormal T2 hyperintensity in the left temporal lobe and hippocampus. There is no abnormal contrast enhancement. There is no significant mass effect. There is no hippocampal volume loss. When compared with the previous MRI, there has been mild   interval increase in the extent of the T2 hyperintensity. The differential diagnosis includes artery autoimmune/limbic encephalitis and glioma. Continued follow-up is recommended.    Assessment & Plan:     32 y.o. female with the following -     1. Encephalopathy  Chronic, managed by neurology.  Patient still does not have an underlying diagnosis for what caused her symptoms starting back in September, but has been feeling improved on Keppra and Lyrica.  Patient did have an MRI completed in February of this year that showed a mild interval increase in the T2 hyperintensity which could be artery autoimmune/limbic encephalitis versus glioma.  Patient just saw her neurologist who is ordering a follow-up MRI to further evaluate.    2. Hirsutism  Chronic, worsening.  Patient reports that she has increased hair growth around her face.  Does have regular menstrual cycle.  Is already established with OB/GYN and is going to follow-up with them to discuss further.  Did have an appointment with high risk OB to discuss the possibility of having another baby given the medications that she is on.  - ESTRADIOL; Future  - FSH/LH; Future  - TESTOSTERONE F&T FEMALES/CHILD; Future    3. Screening for endocrine, metabolic and immunity disorder  - Comp Metabolic Panel; Future  - VITAMIN D,25 HYDROXY (DEFICIENCY); Future  - Lipid Profile; Future  - TSH WITH  REFLEX TO FT4; Future  - HEMOGLOBIN A1C; Future  - CBC WITHOUT DIFFERENTIAL; Future    4. Need for hepatitis C screening test  - HEP C VIRUS ANTIBODY; Future    I spent a total of 30 minutes with record review (including external notes and labs), exam, communication with the patient, communication with other providers, and documentation of this encounter.     Return for follow up labs.    Please note that this dictation was created using voice recognition software. I have made every reasonable attempt to correct obvious errors, but I expect that there are errors of grammar and possibly content that I did not discover before finalizing the note.    Electronically signed by Adilene Cho PA-C on July 17, 2023

## 2023-07-22 ENCOUNTER — HOSPITAL ENCOUNTER (OUTPATIENT)
Dept: LAB | Facility: MEDICAL CENTER | Age: 33
End: 2023-07-22
Attending: PHYSICIAN ASSISTANT
Payer: COMMERCIAL

## 2023-07-22 DIAGNOSIS — Z13.0 SCREENING FOR ENDOCRINE, METABOLIC AND IMMUNITY DISORDER: ICD-10-CM

## 2023-07-22 DIAGNOSIS — L68.0 HIRSUTISM: ICD-10-CM

## 2023-07-22 DIAGNOSIS — Z13.29 SCREENING FOR ENDOCRINE, METABOLIC AND IMMUNITY DISORDER: ICD-10-CM

## 2023-07-22 DIAGNOSIS — Z13.228 SCREENING FOR ENDOCRINE, METABOLIC AND IMMUNITY DISORDER: ICD-10-CM

## 2023-07-22 DIAGNOSIS — Z11.59 NEED FOR HEPATITIS C SCREENING TEST: ICD-10-CM

## 2023-07-22 LAB
25(OH)D3 SERPL-MCNC: 38 NG/ML (ref 30–100)
ALBUMIN SERPL BCP-MCNC: 4.5 G/DL (ref 3.2–4.9)
ALBUMIN/GLOB SERPL: 1.9 G/DL
ALP SERPL-CCNC: 65 U/L (ref 30–99)
ALT SERPL-CCNC: 17 U/L (ref 2–50)
ANION GAP SERPL CALC-SCNC: 10 MMOL/L (ref 7–16)
AST SERPL-CCNC: 18 U/L (ref 12–45)
BILIRUB SERPL-MCNC: 0.3 MG/DL (ref 0.1–1.5)
BUN SERPL-MCNC: 14 MG/DL (ref 8–22)
CALCIUM ALBUM COR SERPL-MCNC: 8.9 MG/DL (ref 8.5–10.5)
CALCIUM SERPL-MCNC: 9.3 MG/DL (ref 8.5–10.5)
CHLORIDE SERPL-SCNC: 109 MMOL/L (ref 96–112)
CHOLEST SERPL-MCNC: 154 MG/DL (ref 100–199)
CO2 SERPL-SCNC: 21 MMOL/L (ref 20–33)
CREAT SERPL-MCNC: 0.72 MG/DL (ref 0.5–1.4)
ERYTHROCYTE [DISTWIDTH] IN BLOOD BY AUTOMATED COUNT: 41.7 FL (ref 35.9–50)
EST. AVERAGE GLUCOSE BLD GHB EST-MCNC: 94 MG/DL
ESTRADIOL SERPL-MCNC: 47.6 PG/ML
FASTING STATUS PATIENT QL REPORTED: NORMAL
FSH SERPL-ACNC: 7.7 MIU/ML
GFR SERPLBLD CREATININE-BSD FMLA CKD-EPI: 113 ML/MIN/1.73 M 2
GLOBULIN SER CALC-MCNC: 2.4 G/DL (ref 1.9–3.5)
GLUCOSE SERPL-MCNC: 83 MG/DL (ref 65–99)
HBA1C MFR BLD: 4.9 % (ref 4–5.6)
HCT VFR BLD AUTO: 45.5 % (ref 37–47)
HCV AB SER QL: NORMAL
HDLC SERPL-MCNC: 60 MG/DL
HGB BLD-MCNC: 15.2 G/DL (ref 12–16)
LDLC SERPL CALC-MCNC: 85 MG/DL
LH SERPL-ACNC: 7.6 IU/L
MCH RBC QN AUTO: 30.5 PG (ref 27–33)
MCHC RBC AUTO-ENTMCNC: 33.4 G/DL (ref 32.2–35.5)
MCV RBC AUTO: 91.4 FL (ref 81.4–97.8)
PLATELET # BLD AUTO: 169 K/UL (ref 164–446)
PMV BLD AUTO: 12.1 FL (ref 9–12.9)
POTASSIUM SERPL-SCNC: 4.3 MMOL/L (ref 3.6–5.5)
PROT SERPL-MCNC: 6.9 G/DL (ref 6–8.2)
RBC # BLD AUTO: 4.98 M/UL (ref 4.2–5.4)
SODIUM SERPL-SCNC: 140 MMOL/L (ref 135–145)
TRIGL SERPL-MCNC: 45 MG/DL (ref 0–149)
TSH SERPL DL<=0.005 MIU/L-ACNC: 1.24 UIU/ML (ref 0.38–5.33)
WBC # BLD AUTO: 5.2 K/UL (ref 4.8–10.8)

## 2023-07-22 PROCEDURE — 80061 LIPID PANEL: CPT

## 2023-07-22 PROCEDURE — 83001 ASSAY OF GONADOTROPIN (FSH): CPT

## 2023-07-22 PROCEDURE — 85027 COMPLETE CBC AUTOMATED: CPT

## 2023-07-22 PROCEDURE — 83002 ASSAY OF GONADOTROPIN (LH): CPT

## 2023-07-22 PROCEDURE — 84403 ASSAY OF TOTAL TESTOSTERONE: CPT

## 2023-07-22 PROCEDURE — 84443 ASSAY THYROID STIM HORMONE: CPT

## 2023-07-22 PROCEDURE — 86803 HEPATITIS C AB TEST: CPT

## 2023-07-22 PROCEDURE — 84270 ASSAY OF SEX HORMONE GLOBUL: CPT

## 2023-07-22 PROCEDURE — 80053 COMPREHEN METABOLIC PANEL: CPT

## 2023-07-22 PROCEDURE — 36415 COLL VENOUS BLD VENIPUNCTURE: CPT

## 2023-07-22 PROCEDURE — 82670 ASSAY OF TOTAL ESTRADIOL: CPT

## 2023-07-22 PROCEDURE — 83036 HEMOGLOBIN GLYCOSYLATED A1C: CPT

## 2023-07-22 PROCEDURE — 82306 VITAMIN D 25 HYDROXY: CPT

## 2023-07-22 PROCEDURE — 84402 ASSAY OF FREE TESTOSTERONE: CPT

## 2023-07-27 LAB
SHBG SERPL-SCNC: 61 NMOL/L (ref 25–122)
TESTOST FREE SERPL-MCNC: 3.5 PG/ML (ref 1.3–9.2)
TESTOST SERPL-MCNC: 31 NG/DL (ref 9–55)

## 2023-08-09 ENCOUNTER — APPOINTMENT (OUTPATIENT)
Dept: RADIOLOGY | Facility: MEDICAL CENTER | Age: 33
End: 2023-08-09
Attending: PSYCHIATRY & NEUROLOGY
Payer: COMMERCIAL

## 2023-08-09 DIAGNOSIS — G04.81 OTHER ENCEPHALITIS AND ENCEPHALOMYELITIS: ICD-10-CM

## 2023-08-09 PROCEDURE — 700117 HCHG RX CONTRAST REV CODE 255: Performed by: PSYCHIATRY & NEUROLOGY

## 2023-08-09 PROCEDURE — A9579 GAD-BASE MR CONTRAST NOS,1ML: HCPCS | Performed by: PSYCHIATRY & NEUROLOGY

## 2023-08-09 PROCEDURE — 70553 MRI BRAIN STEM W/O & W/DYE: CPT

## 2023-08-09 RX ADMIN — GADOTERIDOL 15 ML: 279.3 INJECTION, SOLUTION INTRAVENOUS at 14:02

## 2023-09-15 ENCOUNTER — OFFICE VISIT (OUTPATIENT)
Dept: MEDICAL GROUP | Facility: PHYSICIAN GROUP | Age: 33
End: 2023-09-15
Payer: COMMERCIAL

## 2023-09-15 VITALS
DIASTOLIC BLOOD PRESSURE: 76 MMHG | OXYGEN SATURATION: 100 % | TEMPERATURE: 97.9 F | HEART RATE: 77 BPM | HEIGHT: 64 IN | WEIGHT: 150.2 LBS | BODY MASS INDEX: 25.64 KG/M2 | SYSTOLIC BLOOD PRESSURE: 102 MMHG | RESPIRATION RATE: 13 BRPM

## 2023-09-15 DIAGNOSIS — G93.40 ENCEPHALOPATHY: ICD-10-CM

## 2023-09-15 PROCEDURE — 3074F SYST BP LT 130 MM HG: CPT

## 2023-09-15 PROCEDURE — 3078F DIAST BP <80 MM HG: CPT

## 2023-09-15 PROCEDURE — 99214 OFFICE O/P EST MOD 30 MIN: CPT

## 2023-09-15 ASSESSMENT — FIBROSIS 4 INDEX: FIB4 SCORE: 0.85

## 2023-09-15 NOTE — PROGRESS NOTES
"CC:   Chief Complaint   Patient presents with    Referral Needed     New neurologist         HISTORY OF PRESENT ILLNESS: Patient is a 33 y.o. female established patient who presents today to discuss the following problems below:     Encephalopathy  Patient was diagnosed with viral meningitis and encephalopathy back in September 2022 and is currently followed by [facility] neurolog[facility].  She is taking Keppra 1000 mg 3 times daily.     IMPRESSION:     Stable abnormal diffuse infiltrating T2 hyperintensity noted involving left temporal lobe without contrast enhancement. The differential diagnosis includes gliosis secondary to the encephalitis and low-grade glioma.    Patient wishes to get off of her medications and for a second opinion with another neurologist.       Review of Systems: Otherwise negative except for as stated above.      Exam: /76   Pulse 77   Temp 36.6 °C (97.9 °F) (Temporal)   Resp 13   Ht 1.626 m (5' 4\")   Wt 68.1 kg (150 lb 3.2 oz)   SpO2 100%  Body mass index is 25.78 kg/m².    Physical Exam  Constitutional:       Appearance: Normal appearance.   Eyes:      Extraocular Movements: Extraocular movements intact.   Pulmonary:      Effort: Pulmonary effort is normal.   Musculoskeletal:      Cervical back: Normal range of motion.   Neurological:      General: No focal deficit present.      Mental Status: She is alert and oriented to person, place, and time.   Psychiatric:         Mood and Affect: Mood normal.         Behavior: Behavior normal.         Assessment/Plan:  33 y.o. female with the following -    1. Encephalopathy  Chronic, stable. Reviewed imaging results and notes from [facility] neurology. Reviewed inpatient notes from viral meningitis episode, recommendations at that time indicated patient may be able to wean off of keppra after 6-9 months. Continue lyrica 100mg BID and keppra 1000mg TID for now, [facility]    - Referral to " Neurology    Follow-up: Return if symptoms worsen or fail to improve.    Health Maintenance: Completed      Please note that this dictation was created using voice recognition software. I have made every reasonable attempt to correct obvious errors, but I expect that there are errors of grammar and possibly content that I did not discover before finalizing the note.    Electronically signed by CHEPE Walls on September 15, 2023

## 2023-09-15 NOTE — ASSESSMENT & PLAN NOTE
Patient was diagnosed with viral meningitis and encephalopathy back in September 2022 and is currently followed by [facility] neurology.  She is taking Keppra 1000 mg 3 times daily.     IMPRESSION:     Stable abnormal diffuse infiltrating T2 hyperintensity noted involving left temporal lobe without contrast enhancement. The differential diagnosis includes gliosis secondary to the encephalitis and low-grade glioma.    Patient wishes to get off of her medications and for a second opinion with another neurologist.

## 2023-09-26 ENCOUNTER — TELEPHONE (OUTPATIENT)
Dept: HEALTH INFORMATION MANAGEMENT | Facility: OTHER | Age: 33
End: 2023-09-26
Payer: COMMERCIAL

## 2023-09-28 ENCOUNTER — TELEPHONE (OUTPATIENT)
Dept: NEUROLOGY | Facility: MEDICAL CENTER | Age: 33
End: 2023-09-28
Payer: COMMERCIAL

## 2023-09-28 NOTE — TELEPHONE ENCOUNTER
NEUROLOGY PATIENT PRE-VISIT PLANNING     Patient was NOT contacted to complete PVP.  Note: Patient will not be contacted if there is no indication to call.     Patient Appointment is scheduled as: New Patient     Is visit type and length scheduled correctly? Yes    [facility] in Patient Demographics? Yes    3.   Is referral attached to visit? Yes    4. Were records received from referring provider? Yes    4. Patient was NOT contacted to have someone accompany them to visit.     5. Is this appointment scheduled as a Hospital Follow-Up?  No    6. Does the patient require any pre procedure or post procedure follow up? No    7. If any orders were placed at last visit or intended to be done for this visit do we have Results/Consult Notes? No  Labs - Labs were not ordered at last office visit.  Imaging - Imaging ordered, completed and results are in chart. MRI of the brain done 7/19/23  Referrals - No referrals were ordered at last office visit.  Note: If patient appointment is for lab or imaging review and patient did not complete the studies, check with provider if OK to reschedule patient until completed.    8. If patient appointment is for Botox - is order pended for provider? N/A

## 2023-09-30 ENCOUNTER — OFFICE VISIT (OUTPATIENT)
Dept: URGENT CARE | Facility: PHYSICIAN GROUP | Age: 33
End: 2023-09-30
Payer: COMMERCIAL

## 2023-09-30 VITALS
OXYGEN SATURATION: 98 % | SYSTOLIC BLOOD PRESSURE: 112 MMHG | TEMPERATURE: 98.5 F | HEART RATE: 80 BPM | RESPIRATION RATE: 16 BRPM | DIASTOLIC BLOOD PRESSURE: 82 MMHG | BODY MASS INDEX: 25.61 KG/M2 | WEIGHT: 150 LBS | HEIGHT: 64 IN

## 2023-09-30 DIAGNOSIS — J01.90 ACUTE BACTERIAL SINUSITIS: ICD-10-CM

## 2023-09-30 DIAGNOSIS — B96.89 ACUTE BACTERIAL SINUSITIS: ICD-10-CM

## 2023-09-30 PROCEDURE — 3074F SYST BP LT 130 MM HG: CPT

## 2023-09-30 PROCEDURE — 99213 OFFICE O/P EST LOW 20 MIN: CPT

## 2023-09-30 PROCEDURE — 3079F DIAST BP 80-89 MM HG: CPT

## 2023-09-30 RX ORDER — DOXYCYCLINE HYCLATE 100 MG
100 TABLET ORAL 2 TIMES DAILY
Qty: 14 TABLET | Refills: 0 | Status: SHIPPED | OUTPATIENT
Start: 2023-09-30 | End: 2023-10-07

## 2023-09-30 ASSESSMENT — ENCOUNTER SYMPTOMS
CHILLS: 0
SORE THROAT: 0
HEADACHES: 0
SINUS PRESSURE: 1
HOARSE VOICE: 0
SWOLLEN GLANDS: 0
COUGH: 1
SINUS PAIN: 0
NECK PAIN: 0
DIAPHORESIS: 0

## 2023-09-30 ASSESSMENT — FIBROSIS 4 INDEX: FIB4 SCORE: 0.85

## 2023-09-30 NOTE — PROGRESS NOTES
Subjective:   Celestine Calderón is a very pleasant 33 y.o. female who presents for:    Chief Complaint   Patient presents with    Sinusitis     Head cold X2 weeks and worse this morning  Hx of sinusitis  Covid tests -       HPI:    Sinusitis  This is a new problem. Episode onset: symptoms started two weeks ago, she felt a little better over the last week. This morning she woke up with increased sinus pain and pressure. There has been no fever. Associated symptoms include congestion, coughing, ear pain and sinus pressure. Pertinent negatives include no chills, diaphoresis, headaches, hoarse voice, neck pain, sneezing, sore throat or swollen glands. Treatments tried: Mucinex-D, Flonase, antihistamine, Tylenol, Ibuprofen. The treatment provided mild relief.       ROS:    Review of Systems   Constitutional:  Negative for chills and diaphoresis.   HENT:  Positive for congestion, ear pain and sinus pressure. Negative for ear discharge, hoarse voice, sinus pain, sneezing and sore throat.    Respiratory:  Positive for cough.    Musculoskeletal:  Negative for neck pain.   Neurological:  Negative for headaches.   All other systems reviewed and are negative.      Medications:      Current Outpatient Medications   Medication Sig    pregabalin (LYRICA) 100 MG Cap Take 100 mg by mouth 2 times a day.    levetiracetam (KEPPRA) 1000 MG tablet Take 1 Tablet by mouth 2 times a day. (Patient taking differently: Take 1,000 mg by mouth 3 times a day.)       Allergies:     Allergies   Allergen Reactions    Penicillins Hives     Family member states reaction occurred in childhood.    Penicillin G        Problem List:     Patient Active Problem List   Diagnosis    Encephalopathy    Hirsutism       Surgical History:    Past Surgical History:   Procedure Laterality Date    TONSILLECTOMY  7/9/08    Performed by NATHAN ROBERTSON at SURGERY SAME DAY [facility]    ETHMOIDECTOMY  7/9/08    [facility]    ANTROSTOMY  7/9/08    Performed by NATHAN ROBERTSON at SURGERY SAME DAY [facility]    TURBINOPLASTY  7/9/08    Performed by NATHAN ROBERTSON at SURGERY SAME DAY [facility]    OTHER SURGICAL PROCEDURE      benign lymph node removed, right groin, 2005       Past Social Hx:     Social History     Socioeconomic History    Marital status:     Highest education level: 12th grade   Tobacco Use    Smoking status: Never    Smokeless tobacco: Never   Vaping Use    Vaping Use: Never used   Substance and Sexual Activity    Alcohol use: Not Currently    Drug use: Never     Social Determinants of Health     Financial Resource Strain: Low Risk  (8/23/2022)    Overall Financial Resource Strain (CARDIA)     Difficulty of Paying Living Expenses: Not very hard   Food Insecurity: No Food Insecurity (8/23/2022)    Hunger Vital Sign     Worried About Running Out of Food in the Last Year: Never true     Ran Out of Food in the Last Year: Never true   Transportation Needs: No Transportation Needs (8/23/2022)    PRAPARE - Transportation     Lack of Transportation (Medical): No     Lack of Transportation (Non-Medical): No   Physical Activity: Sufficiently Active (8/23/2022)    Exercise Vital Sign     Days of Exercise per Week: 3 days     Minutes of Exercise per Session: 50 min   Stress: No Stress Concern Present (8/23/2022)    [employer] - Occupational Stress Questionnaire     Feeling of Stress : Not at all   Social Connections: Unknown (8/23/2022)    Social Connection and Isolation Panel [NHANES]     Frequency of Communication with Friends and Family: Patient refused     Frequency of Social Gatherings with Friends and Family: Once a week     Attends Tenriism Services: Patient refused     Active Member of Clubs or Organizations: No     Attends Club or Organization Meetings: Never     Marital Status:    Housing Stability: Low Risk  (8/23/2022)    Housing Stability Vital  Sign     Unable to Pay for Housing in the Last Year: No     Number of Places Lived in the Last Year: 1     Unstable Housing in the Last Year: No        Past Family Hx:      No family history on file.    Problem list, medications, and allergies reviewed by myself today in Epic.     Objective:     Vitals:    09/30/23 1428   BP: 112/82   Pulse: 80   Resp: 16   Temp: 36.9 °C (98.5 °F)   SpO2: 98%       Physical Exam  Vitals reviewed.   Constitutional:       General: She is not in acute distress.     Appearance: Normal appearance. She is not ill-appearing, toxic-appearing or diaphoretic.   HENT:      Head: Normocephalic and atraumatic.      Right Ear: Tympanic membrane, ear canal and external ear normal.      Left Ear: Tympanic membrane, ear canal and external ear normal.      Nose: Congestion and rhinorrhea present.      Right Sinus: Maxillary sinus tenderness present. No frontal sinus tenderness.      Left Sinus: No maxillary sinus tenderness or frontal sinus tenderness.      Mouth/Throat:      Mouth: Mucous membranes are moist.      Pharynx: Oropharynx is clear.   Eyes:      Extraocular Movements: Extraocular movements intact.      Conjunctiva/sclera: Conjunctivae normal.      Pupils: Pupils are equal, round, and reactive to light.   Cardiovascular:      Rate and Rhythm: Normal rate and regular rhythm.      Pulses: Normal pulses.      Heart sounds: Normal heart sounds.   Pulmonary:      Effort: Pulmonary effort is normal.      Breath sounds: Normal breath sounds.   Abdominal:      General: Abdomen is flat.      Palpations: Abdomen is soft.   Musculoskeletal:         General: Normal range of motion.      Cervical back: Normal range of motion.   Skin:     General: Skin is warm and dry.   Neurological:      General: No focal deficit present.      Mental Status: She is alert and oriented to person, place, and time.   Psychiatric:         Mood and Affect: Mood normal.         Behavior: Behavior normal.         Thought  Content: Thought content normal.               Assessment/Plan:     Diagnosis and associated orders:     1. Acute bacterial sinusitis  - doxycycline (VIBRAMYCIN) 100 MG Tab; Take 1 Tablet by mouth 2 times a day for 7 days.  Dispense: 14 Tablet; Refill: 0          Comments/MDM:     Doxycycline sent to patient's preferred pharmacy for the treatment of acute bacterial rhinosinusitis  No evidence of lower respiratory involvement on exam today    Recommend symptomatic care:  OTC second generation antihistamine daily (cetirizine, desloratadine, fexofenadine, levocetirizine, and loratadine) daily IN COMBINATION WITH:  OTC decongestant (Sudafed - Pseudoephedrine) unless contraindication in place, such as hypertension, CAD, narrow-angle glaucoma. Use with caution if the patient has a history of cardiac dysrhythmias, hyperthyroidism, DM, prostatic hypertrophy, and glaucoma should use with caution.  Intranasal fluticasone (Flonase) daily  Nasal saline rinses 2-3 times a day   May use short term nasal sprays, such as oxymetazoline (Afrin) to help relieve nasal discomfort, congestion, and/or pressure. Decongestant sprays should not be used longer than three consecutive days.   Nasal rinsing with saline nasal spray or salt water (e.g., neti pot) can help relieve nasal dryness.  Breathe Right nasal strips at night for nasal congestion  Ponaris nasal emmollient for nasal congestion, dryness, and inflammation (do not use with iodine sensitivity)  Cool mist humidification, chest rubs, warm tea with honey, increased fluid intake to thin secretions  Tylenol or ibuprofen as needed for fever control, body aches, and headaches.    If sore throat is present:   Warm salt water gargles, over-the-counter throat sprays, rest, hydration with frozen (eg, ice or popsicles) or warmed liquids, herbal tea containing licorice root, elm inner bark, marshmallow root, and licorice root aqueous dry extract, Cepacol lozenges, soft diet, honey, vitamin C,  zinc lozenges, and elderberry supplements.    Return to clinic or go to the ED if symptoms worsen or fail to improve, or if the patient should develop worsening/increasing sinus pain/pressure, congestion, ear pain, sore throat, headache, cough, shortness of breath, wheezing, chest pain, fever/chills, and/or any concerning symptoms. Patient is in agreement with treatment plan.           All questions answered. Patient verbalized understanding and is in agreement with this plan of care.     If symptoms are worsening or not improving in 3-5 days, follow-up with PCP or return to UC. Differential diagnosis, natural history, and supportive care discussed. AVS handout given and reviewed with patient. Patient educated on red flags and when to seek treatment back in ED or UC.     I personally reviewed prior external notes and test results pertinent to today's visit.  I have independently reviewed and interpreted all diagnostics ordered during this urgent care visit.     This dictation has been created using voice recognition software. The accuracy of the dictation is limited by the abilities of the software. I expect there may be some errors of grammar and possibly content. I made every attempt to manually correct the errors within my dictation. However, errors related to voice recognition software may still exist and should be interpreted within the appropriate context.    This note was electronically signed by EDILSON Marie

## 2023-10-02 ENCOUNTER — APPOINTMENT (RX ONLY)
Dept: URBAN - METROPOLITAN AREA CLINIC 6 | Facility: CLINIC | Age: 33
Setting detail: DERMATOLOGY
End: 2023-10-02

## 2023-10-02 DIAGNOSIS — Z41.9 ENCOUNTER FOR PROCEDURE FOR PURPOSES OTHER THAN REMEDYING HEALTH STATE, UNSPECIFIED: ICD-10-CM

## 2023-10-02 DIAGNOSIS — D22 MELANOCYTIC NEVI: ICD-10-CM

## 2023-10-02 DIAGNOSIS — L0292 CARBUNCLE AND FURUNCLE OF UNSPECIFIED SITE: ICD-10-CM

## 2023-10-02 DIAGNOSIS — D18.0 HEMANGIOMA: ICD-10-CM

## 2023-10-02 DIAGNOSIS — L0293 CARBUNCLE AND FURUNCLE OF UNSPECIFIED SITE: ICD-10-CM

## 2023-10-02 DIAGNOSIS — Z71.89 OTHER SPECIFIED COUNSELING: ICD-10-CM

## 2023-10-02 DIAGNOSIS — L81.4 OTHER MELANIN HYPERPIGMENTATION: ICD-10-CM

## 2023-10-02 PROBLEM — D22.62 MELANOCYTIC NEVI OF LEFT UPPER LIMB, INCLUDING SHOULDER: Status: ACTIVE | Noted: 2023-10-02

## 2023-10-02 PROBLEM — D18.01 HEMANGIOMA OF SKIN AND SUBCUTANEOUS TISSUE: Status: ACTIVE | Noted: 2023-10-02

## 2023-10-02 PROBLEM — L08.9 LOCAL INFECTION OF THE SKIN AND SUBCUTANEOUS TISSUE, UNSPECIFIED: Status: ACTIVE | Noted: 2023-10-02

## 2023-10-02 PROBLEM — D22.5 MELANOCYTIC NEVI OF TRUNK: Status: ACTIVE | Noted: 2023-10-02

## 2023-10-02 PROBLEM — D23.39 OTHER BENIGN NEOPLASM OF SKIN OF OTHER PARTS OF FACE: Status: ACTIVE | Noted: 2023-10-02

## 2023-10-02 PROCEDURE — ? INTRALESIONAL KENALOG

## 2023-10-02 PROCEDURE — 11900 INJECT SKIN LESIONS </W 7: CPT

## 2023-10-02 PROCEDURE — ? PRESCRIPTION

## 2023-10-02 PROCEDURE — ? MEDICATION COUNSELING

## 2023-10-02 PROCEDURE — ? ADDITIONAL NOTES

## 2023-10-02 PROCEDURE — ? COUNSELING

## 2023-10-02 PROCEDURE — 99203 OFFICE O/P NEW LOW 30 MIN: CPT | Mod: 25

## 2023-10-02 PROCEDURE — ? SUNSCREEN RECOMMENDATIONS

## 2023-10-02 PROCEDURE — ? PRESCRIPTION MEDICATION MANAGEMENT

## 2023-10-02 RX ORDER — CLINDAMYCIN PHOSPHATE 10 MG/ML
LOTION TOPICAL
Qty: 60 | Refills: 5 | Status: ERX | COMMUNITY
Start: 2023-10-02

## 2023-10-02 RX ADMIN — CLINDAMYCIN PHOSPHATE: 10 LOTION TOPICAL at 00:00

## 2023-10-02 ASSESSMENT — LOCATION ZONE DERM
LOCATION ZONE: HAND
LOCATION ZONE: TRUNK
LOCATION ZONE: FACE
LOCATION ZONE: LEG

## 2023-10-02 ASSESSMENT — LOCATION DETAILED DESCRIPTION DERM
LOCATION DETAILED: LEFT INFERIOR MEDIAL FOREHEAD
LOCATION DETAILED: SUBMENTAL CHIN
LOCATION DETAILED: LEFT ANTERIOR MEDIAL PROXIMAL THIGH
LOCATION DETAILED: LEFT MID-UPPER BACK
LOCATION DETAILED: LEFT THENAR EMINENCE
LOCATION DETAILED: LEFT INFERIOR UPPER BACK

## 2023-10-02 ASSESSMENT — LOCATION SIMPLE DESCRIPTION DERM
LOCATION SIMPLE: SUBMENTAL CHIN
LOCATION SIMPLE: LEFT HAND
LOCATION SIMPLE: LEFT UPPER BACK
LOCATION SIMPLE: LEFT THIGH
LOCATION SIMPLE: LEFT FOREHEAD

## 2023-10-02 NOTE — PROCEDURE: ADDITIONAL NOTES
Detail Level: Detailed
Render Risk Assessment In Note?: no
Additional Notes: Currently using OTC Retinol will transition to prescription strength at follow up visit.
Additional Notes: Advised to use a BPO wash once daily such as CeraVe Acne Foaming or Panoxyl 5%.

## 2023-10-02 NOTE — PROCEDURE: MEDICATION COUNSELING
Ketoconazole Pregnancy And Lactation Text: This medication is Pregnancy Category C and it isn't know if it is safe during pregnancy. It is also excreted in breast milk and breast feeding isn't recommended.
Aklief Pregnancy And Lactation Text: It is unknown if this medication is safe to use during pregnancy.  It is unknown if this medication is excreted in breast milk.  Breastfeeding women should use the topical cream on the smallest area of the skin for the shortest time needed while breastfeeding.  Do not apply to nipple and areola.
Finasteride Male Counseling: Finasteride Counseling:  I discussed with the patient the risks of use of finasteride including but not limited to decreased libido, decreased ejaculate volume, gynecomastia, and depression. Women should not handle medication.  All of the patient's questions and concerns were addressed.
5-Fu Counseling: 5-Fluorouracil Counseling:  I discussed with the patient the risks of 5-fluorouracil including but not limited to erythema, scaling, itching, weeping, crusting, and pain.
Cosentyx Counseling:  I discussed with the patient the risks of Cosentyx including but not limited to worsening of Crohn's disease, immunosuppression, allergic reactions and infections.  The patient understands that monitoring is required including a PPD at baseline and must alert us or the primary physician if symptoms of infection or other concerning signs are noted.
Topical Sulfur Applications Counseling: Topical Sulfur Counseling: Patient counseled that this medication may cause skin irritation or allergic reactions.  In the event of skin irritation, the patient was advised to reduce the amount of the drug applied or use it less frequently.   The patient verbalized understanding of the proper use and possible adverse effects of topical sulfur application.  All of the patient's questions and concerns were addressed.
Cyclophosphamide Pregnancy And Lactation Text: This medication is Pregnancy Category D and it isn't considered safe during pregnancy. This medication is excreted in breast milk.
Clindamycin Pregnancy And Lactation Text: This medication can be used in pregnancy if certain situations. Clindamycin is also present in breast milk.
Colchicine Counseling:  Patient counseled regarding adverse effects including but not limited to stomach upset (nausea, vomiting, stomach pain, or diarrhea).  Patient instructed to limit alcohol consumption while taking this medication.  Colchicine may reduce blood counts especially with prolonged use.  The patient understands that monitoring of kidney function and blood counts may be required, especially at baseline. The patient verbalized understanding of the proper use and possible adverse effects of colchicine.  All of the patient's questions and concerns were addressed.
Tranexamic Acid Counseling:  Patient advised of the small risk of bleeding problems with tranexamic acid. They were also instructed to call if they developed any nausea, vomiting or diarrhea. All of the patient's questions and concerns were addressed.
Olumiant Counseling: I discussed with the patient the risks of Olumiant therapy including but not limited to upper respiratory tract infections, shingles, cold sores, and nausea. Live vaccines should be avoided.  This medication has been linked to serious infections; higher rate of mortality; malignancy and lymphoproliferative disorders; major adverse cardiovascular events; thrombosis; gastrointestinal perforations; neutropenia; lymphopenia; anemia; liver enzyme elevations; and lipid elevations.
Opzelura Pregnancy And Lactation Text: There is insufficient data to evaluate drug-associated risk for major birth defects, miscarriage, or other adverse maternal or fetal outcomes.  There is a pregnancy registry that monitors pregnancy outcomes in pregnant persons exposed to the medication during pregnancy.  It is unknown if this medication is excreted in breast milk.  Do not breastfeed during treatment and for about 4 weeks after the last dose.
Acitretin Pregnancy And Lactation Text: This medication is Pregnancy Category X and should not be given to women who are pregnant or may become pregnant in the future. This medication is excreted in breast milk.
Tazorac Pregnancy And Lactation Text: This medication is not safe during pregnancy. It is unknown if this medication is excreted in breast milk.
Hydroxychloroquine Pregnancy And Lactation Text: This medication has been shown to cause fetal harm but it isn't assigned a Pregnancy Risk Category. There are small amounts excreted in breast milk.
Erivedge Counseling- I discussed with the patient the risks of Erivedge including but not limited to nausea, vomiting, diarrhea, constipation, weight loss, changes in the sense of taste, decreased appetite, muscle spasms, and hair loss.  The patient verbalized understanding of the proper use and possible adverse effects of Erivedge.  All of the patient's questions and concerns were addressed.
Skyrizi Pregnancy And Lactation Text: The risk during pregnancy and breastfeeding is uncertain with this medication.
Imiquimod Counseling:  I discussed with the patient the risks of imiquimod including but not limited to erythema, scaling, itching, weeping, crusting, and pain.  Patient understands that the inflammatory response to imiquimod is variable from person to person and was educated regarded proper titration schedule.  If flu-like symptoms develop, patient knows to discontinue the medication and contact us.
Rhofade Counseling: Rhofade is a topical medication which can decrease superficial blood flow where applied. Side effects are uncommon and include stinging, redness and allergic reactions.
Zyclara Counseling:  I discussed with the patient the risks of imiquimod including but not limited to erythema, scaling, itching, weeping, crusting, and pain.  Patient understands that the inflammatory response to imiquimod is variable from person to person and was educated regarded proper titration schedule.  If flu-like symptoms develop, patient knows to discontinue the medication and contact us.
Quinolones Pregnancy And Lactation Text: This medication is Pregnancy Category C and it isn't know if it is safe during pregnancy. It is also excreted in breast milk.
Doxycycline Counseling:  Patient counseled regarding possible photosensitivity and increased risk for sunburn.  Patient instructed to avoid sunlight, if possible.  When exposed to sunlight, patients should wear protective clothing, sunglasses, and sunscreen.  The patient was instructed to call the office immediately if the following severe adverse effects occur:  hearing changes, easy bruising/bleeding, severe headache, or vision changes.  The patient verbalized understanding of the proper use and possible adverse effects of doxycycline.  All of the patient's questions and concerns were addressed.
Topical Sulfur Applications Pregnancy And Lactation Text: This medication is Pregnancy Category C and has an unknown safety profile during pregnancy. It is unknown if this topical medication is excreted in breast milk.
Finasteride Pregnancy And Lactation Text: This medication is absolutely contraindicated during pregnancy. It is unknown if it is excreted in breast milk.
Albendazole Counseling:  I discussed with the patient the risks of albendazole including but not limited to cytopenia, kidney damage, nausea/vomiting and severe allergy.  The patient understands that this medication is being used in an off-label manner.
Griseofulvin Counseling:  I discussed with the patient the risks of griseofulvin including but not limited to photosensitivity, cytopenia, liver damage, nausea/vomiting and severe allergy.  The patient understands that this medication is best absorbed when taken with a fatty meal (e.g., ice cream or french fries).
5-Fu Pregnancy And Lactation Text: This medication is Pregnancy Category X and contraindicated in pregnancy and in women who may become pregnant. It is unknown if this medication is excreted in breast milk.
Cyclosporine Counseling:  I discussed with the patient the risks of cyclosporine including but not limited to hypertension, gingival hyperplasia,myelosuppression, immunosuppression, liver damage, kidney damage, neurotoxicity, lymphoma, and serious infections. The patient understands that monitoring is required including baseline blood pressure, CBC, CMP, lipid panel and uric acid, and then 1-2 times monthly CMP and blood pressure.
Oral Minoxidil Pregnancy And Lactation Text: This medication should only be used when clearly needed if you are pregnant, attempting to become pregnant or breast feeding.
Infliximab Counseling:  I discussed with the patient the risks of infliximab including but not limited to myelosuppression, immunosuppression, autoimmune hepatitis, demyelinating diseases, lymphoma, and serious infections.  The patient understands that monitoring is required including a PPD at baseline and must alert us or the primary physician if symptoms of infection or other concerning signs are noted.
Picato Counseling:  I discussed with the patient the risks of Picato including but not limited to erythema, scaling, itching, weeping, crusting, and pain.
Cosentyx Pregnancy And Lactation Text: This medication is Pregnancy Category B and is considered safe during pregnancy. It is unknown if this medication is excreted in breast milk.
Low Dose Naltrexone Counseling- I discussed with the patient the potential risks and side effects of low dose naltrexone including but not limited to: more vivid dreams, headaches, nausea, vomiting, abdominal pain, fatigue, dizziness, and anxiety.
Azelaic Acid Counseling: Patient counseled that medicine may cause skin irritation and to avoid applying near the eyes.  In the event of skin irritation, the patient was advised to reduce the amount of the drug applied or use it less frequently.   The patient verbalized understanding of the proper use and possible adverse effects of azelaic acid.  All of the patient's questions and concerns were addressed.
Topical Clindamycin Counseling: Patient counseled that this medication may cause skin irritation or allergic reactions.  In the event of skin irritation, the patient was advised to reduce the amount of the drug applied or use it less frequently.   The patient verbalized understanding of the proper use and possible adverse effects of clindamycin.  All of the patient's questions and concerns were addressed.
Olumiant Pregnancy And Lactation Text: Based on animal studies, Olumiant may cause embryo-fetal harm when administered to pregnant women.  The medication should not be used in pregnancy.  Breastfeeding is not recommended during treatment.
Terbinafine Counseling: Patient counseling regarding adverse effects of terbinafine including but not limited to headache, diarrhea, rash, upset stomach, liver function test abnormalities, itching, taste/smell disturbance, nausea, abdominal pain, and flatulence.  There is a rare possibility of liver failure that can occur when taking terbinafine.  The patient understands that a baseline LFT and kidney function test may be required. The patient verbalized understanding of the proper use and possible adverse effects of terbinafine.  All of the patient's questions and concerns were addressed.
Rhofade Pregnancy And Lactation Text: This medication has not been assigned a Pregnancy Risk Category. It is unknown if the medication is excreted in breast milk.
Rifampin Counseling: I discussed with the patient the risks of rifampin including but not limited to liver damage, kidney damage, red-orange body fluids, nausea/vomiting and severe allergy.
Bexarotene Counseling:  I discussed with the patient the risks of bexarotene including but not limited to hair loss, dry lips/skin/eyes, liver abnormalities, hyperlipidemia, pancreatitis, depression/suicidal ideation, photosensitivity, drug rash/allergic reactions, hypothyroidism, anemia, leukopenia, infection, cataracts, and teratogenicity.  Patient understands that they will need regular blood tests to check lipid profile, liver function tests, white blood cell count, thyroid function tests and pregnancy test if applicable.
Stelara Counseling:  I discussed with the patient the risks of ustekinumab including but not limited to immunosuppression, malignancy, posterior leukoencephalopathy syndrome, and serious infections.  The patient understands that monitoring is required including a PPD at baseline and must alert us or the primary physician if symptoms of infection or other concerning signs are noted.
Tranexamic Acid Pregnancy And Lactation Text: It is unknown if this medication is safe during pregnancy or breast feeding.
Colchicine Pregnancy And Lactation Text: This medication is Pregnancy Category C and isn't considered safe during pregnancy. It is excreted in breast milk.
Albendazole Pregnancy And Lactation Text: This medication is Pregnancy Category C and it isn't known if it is safe during pregnancy. It is also excreted in breast milk.
Erivedge Pregnancy And Lactation Text: This medication is Pregnancy Category X and is absolutely contraindicated during pregnancy. It is unknown if it is excreted in breast milk.
Doxycycline Pregnancy And Lactation Text: This medication is Pregnancy Category D and not consider safe during pregnancy. It is also excreted in breast milk but is considered safe for shorter treatment courses.
Griseofulvin Pregnancy And Lactation Text: This medication is Pregnancy Category X and is known to cause serious birth defects. It is unknown if this medication is excreted in breast milk but breast feeding should be avoided.
Cyclosporine Pregnancy And Lactation Text: This medication is Pregnancy Category C and it isn't know if it is safe during pregnancy. This medication is excreted in breast milk.
Drysol Counseling:  I discussed with the patient the risks of drysol/aluminum chloride including but not limited to skin rash, itching, irritation, burning.
Imiquimod Pregnancy And Lactation Text: This medication is Pregnancy Category C. It is unknown if this medication is excreted in breast milk.
Dupixent Counseling: I discussed with the patient the risks of dupilumab including but not limited to eye infection and irritation, cold sores, injection site reactions, worsening of asthma, allergic reactions and increased risk of parasitic infection.  Live vaccines should be avoided while taking dupilumab. Dupilumab will also interact with certain medications such as warfarin and cyclosporine. The patient understands that monitoring is required and they must alert us or the primary physician if symptoms of infection or other concerning signs are noted.
Azithromycin Counseling:  I discussed with the patient the risks of azithromycin including but not limited to GI upset, allergic reaction, drug rash, diarrhea, and yeast infections.
Low Dose Naltrexone Pregnancy And Lactation Text: Naltrexone is pregnancy category C.  There have been no adequate and well-controlled studies in pregnant women.  It should be used in pregnancy only if the potential benefit justifies the potential risk to the fetus.   Limited data indicates that naltrexone is minimally excreted into breastmilk.
Topical Clindamycin Pregnancy And Lactation Text: This medication is Pregnancy Category B and is considered safe during pregnancy. It is unknown if it is excreted in breast milk.
Rinvoq Counseling: I discussed with the patient the risks of Rinvoq therapy including but not limited to upper respiratory tract infections, shingles, cold sores, bronchitis, nausea, cough, fever, acne, and headache. Live vaccines should be avoided.  This medication has been linked to serious infections; higher rate of mortality; malignancy and lymphoproliferative disorders; major adverse cardiovascular events; thrombosis; thrombocytopenia, anemia, and neutropenia; lipid elevations; liver enzyme elevations; and gastrointestinal perforations.
Terbinafine Pregnancy And Lactation Text: This medication is Pregnancy Category B and is considered safe during pregnancy. It is also excreted in breast milk and breast feeding isn't recommended.
Wartpeel Counseling:  I discussed with the patient the risks of Wartpeel including but not limited to erythema, scaling, itching, weeping, crusting, and pain.
Birth Control Pills Counseling: Birth Control Pill Counseling: I discussed with the patient the potential side effects of OCPs including but not limited to increased risk of stroke, heart attack, thrombophlebitis, deep venous thrombosis, hepatic adenomas, breast changes, GI upset, headaches, and depression.  The patient verbalized understanding of the proper use and possible adverse effects of OCPs. All of the patient's questions and concerns were addressed.
Otezla Counseling: The side effects of Otezla were discussed with the patient, including but not limited to worsening or new depression, weight loss, diarrhea, nausea, upper respiratory tract infection, and headache. Patient instructed to call the office should any adverse effect occur.  The patient verbalized understanding of the proper use and possible adverse effects of Otezla.  All the patient's questions and concerns were addressed.
Bexarotene Pregnancy And Lactation Text: This medication is Pregnancy Category X and should not be given to women who are pregnant or may become pregnant. This medication should not be used if you are breast feeding.
Azelaic Acid Pregnancy And Lactation Text: This medication is considered safe during pregnancy and breast feeding.
Dapsone Counseling: I discussed with the patient the risks of dapsone including but not limited to hemolytic anemia, agranulocytosis, rashes, methemoglobinemia, kidney failure, peripheral neuropathy, headaches, GI upset, and liver toxicity.  Patients who start dapsone require monitoring including baseline LFTs and weekly CBCs for the first month, then every month thereafter.  The patient verbalized understanding of the proper use and possible adverse effects of dapsone.  All of the patient's questions and concerns were addressed.
Valtrex Counseling: I discussed with the patient the risks of valacyclovir including but not limited to kidney damage, nausea, vomiting and severe allergy.  The patient understands that if the infection seems to be worsening or is not improving, they are to call.
Rifampin Pregnancy And Lactation Text: This medication is Pregnancy Category C and it isn't know if it is safe during pregnancy. It is also excreted in breast milk and should not be used if you are breast feeding.
Rituxan Counseling:  I discussed with the patient the risks of Rituxan infusions. Side effects can include infusion reactions, severe drug rashes including mucocutaneous reactions, reactivation of latent hepatitis and other infections and rarely progressive multifocal leukoencephalopathy.  All of the patient's questions and concerns were addressed.
Itraconazole Counseling:  I discussed with the patient the risks of itraconazole including but not limited to liver damage, nausea/vomiting, neuropathy, and severe allergy.  The patient understands that this medication is best absorbed when taken with acidic beverages such as non-diet cola or ginger ale.  The patient understands that monitoring is required including baseline LFTs and repeat LFTs at intervals.  The patient understands that they are to contact us or the primary physician if concerning signs are noted.
Klisyri Counseling:  I discussed with the patient the risks of Klisyri including but not limited to erythema, scaling, itching, weeping, crusting, and pain.
Solaraze Counseling:  I discussed with the patient the risks of Solaraze including but not limited to erythema, scaling, itching, weeping, crusting, and pain.
Azithromycin Pregnancy And Lactation Text: This medication is considered safe during pregnancy and is also secreted in breast milk.
Methotrexate Counseling:  Patient counseled regarding adverse effects of methotrexate including but not limited to nausea, vomiting, abnormalities in liver function tests. Patients may develop mouth sores, rash, diarrhea, and abnormalities in blood counts. The patient understands that monitoring is required including LFT's and blood counts.  There is a rare possibility of scarring of the liver and lung problems that can occur when taking methotrexate. Persistent nausea, loss of appetite, pale stools, dark urine, cough, and shortness of breath should be reported immediately. Patient advised to discontinue methotrexate treatment at least three months before attempting to become pregnant.  I discussed the need for folate supplements while taking methotrexate.  These supplements can decrease side effects during methotrexate treatment. The patient verbalized understanding of the proper use and possible adverse effects of methotrexate.  All of the patient's questions and concerns were addressed.
Otezla Pregnancy And Lactation Text: This medication is Pregnancy Category C and it isn't known if it is safe during pregnancy. It is unknown if it is excreted in breast milk.
Birth Control Pills Pregnancy And Lactation Text: This medication should be avoided if pregnant and for the first 30 days post-partum.
Libtayo Counseling- I discussed with the patient the risks of Libtayo including but not limited to nausea, vomiting, diarrhea, and bone or muscle pain.  The patient verbalized understanding of the proper use and possible adverse effects of Libtayo.  All of the patient's questions and concerns were addressed.
Ivermectin Counseling:  Patient instructed to take medication on an empty stomach with a full glass of water.  Patient informed of potential adverse effects including but not limited to nausea, diarrhea, dizziness, itching, and swelling of the extremities or lymph nodes.  The patient verbalized understanding of the proper use and possible adverse effects of ivermectin.  All of the patient's questions and concerns were addressed.
Erythromycin Counseling:  I discussed with the patient the risks of erythromycin including but not limited to GI upset, allergic reaction, drug rash, diarrhea, increase in liver enzymes, and yeast infections.
Topical Ketoconazole Counseling: Patient counseled that this medication may cause skin irritation or allergic reactions.  In the event of skin irritation, the patient was advised to reduce the amount of the drug applied or use it less frequently.   The patient verbalized understanding of the proper use and possible adverse effects of ketoconazole.  All of the patient's questions and concerns were addressed.
Valtrex Pregnancy And Lactation Text: this medication is Pregnancy Category B and is considered safe during pregnancy. This medication is not directly found in breast milk but it's metabolite acyclovir is present.
Benzoyl Peroxide Counseling: Patient counseled that medicine may cause skin irritation and bleach clothing.  In the event of skin irritation, the patient was advised to reduce the amount of the drug applied or use it less frequently.   The patient verbalized understanding of the proper use and possible adverse effects of benzoyl peroxide.  All of the patient's questions and concerns were addressed.
Opioid Counseling: I discussed with the patient the potential side effects of opioids including but not limited to addiction, altered mental status, and depression. I stressed avoiding alcohol, benzodiazepines, muscle relaxants and sleep aids unless specifically okayed by a physician. The patient verbalized understanding of the proper use and possible adverse effects of opioids. All of the patient's questions and concerns were addressed. They were instructed to flush the remaining pills down the toilet if they did not need them for pain.
Rinvoq Pregnancy And Lactation Text: Based on animal studies, Rinvoq may cause embryo-fetal harm when administered to pregnant women.  The medication should not be used in pregnancy.  Breastfeeding is not recommended during treatment and for 6 days after the last dose.
Dupixent Pregnancy And Lactation Text: This medication likely crosses the placenta but the risk for the fetus is uncertain. This medication is excreted in breast milk.
Niacinamide Counseling: I recommended taking niacin or niacinamide, also know as vitamin B3, twice daily. Recent evidence suggests that taking vitamin B3 (500 mg twice daily) can reduce the risk of actinic keratoses and non-melanoma skin cancers. Side effects of vitamin B3 include flushing and headache.
Klisyri Pregnancy And Lactation Text: It is unknown if this medication can harm a developing fetus or if it is excreted in breast milk.
Sarecycline Counseling: Patient advised regarding possible photosensitivity and discoloration of the teeth, skin, lips, tongue and gums.  Patient instructed to avoid sunlight, if possible.  When exposed to sunlight, patients should wear protective clothing, sunglasses, and sunscreen.  The patient was instructed to call the office immediately if the following severe adverse effects occur:  hearing changes, easy bruising/bleeding, severe headache, or vision changes.  The patient verbalized understanding of the proper use and possible adverse effects of sarecycline.  All of the patient's questions and concerns were addressed.
Isotretinoin Counseling: Patient should get monthly blood tests, not donate blood, not drive at night if vision affected, not share medication, and not undergo elective surgery for 6 months after tx completed. Side effects reviewed, pt to contact office should one occur.
Taltz Counseling: I discussed with the patient the risks of ixekizumab including but not limited to immunosuppression, serious infections, worsening of inflammatory bowel disease and drug reactions.  The patient understands that monitoring is required including a PPD at baseline and must alert us or the primary physician if symptoms of infection or other concerning signs are noted.
Dapsone Pregnancy And Lactation Text: This medication is Pregnancy Category C and is not considered safe during pregnancy or breast feeding.
Solaraze Pregnancy And Lactation Text: This medication is Pregnancy Category B and is considered safe. There is some data to suggest avoiding during the third trimester. It is unknown if this medication is excreted in breast milk.
Propranolol Counseling:  I discussed with the patient the risks of propranolol including but not limited to low heart rate, low blood pressure, low blood sugar, restlessness and increased cold sensitivity. They should call the office if they experience any of these side effects.
Erythromycin Pregnancy And Lactation Text: This medication is Pregnancy Category B and is considered safe during pregnancy. It is also excreted in breast milk.
Rituxan Pregnancy And Lactation Text: This medication is Pregnancy Category C and it isn't know if it is safe during pregnancy. It is unknown if this medication is excreted in breast milk but similar antibodies are known to be excreted.
Oxybutynin Counseling:  I discussed with the patient the risks of oxybutynin including but not limited to skin rash, drowsiness, dry mouth, difficulty urinating, and blurred vision.
Winlevi Counseling:  I discussed with the patient the risks of topical clascoterone including but not limited to erythema, scaling, itching, and stinging. Patient voiced their understanding.
Libtayo Pregnancy And Lactation Text: This medication is contraindicated in pregnancy and when breast feeding.
Cimetidine Counseling:  I discussed with the patient the risks of Cimetidine including but not limited to gynecomastia, headache, diarrhea, nausea, drowsiness, arrhythmias, pancreatitis, skin rashes, psychosis, bone marrow suppression and kidney toxicity.
Azathioprine Counseling:  I discussed with the patient the risks of azathioprine including but not limited to myelosuppression, immunosuppression, hepatotoxicity, lymphoma, and infections.  The patient understands that monitoring is required including baseline LFTs, Creatinine, possible TPMP genotyping and weekly CBCs for the first month and then every 2 weeks thereafter.  The patient verbalized understanding of the proper use and possible adverse effects of azathioprine.  All of the patient's questions and concerns were addressed.
Opioid Pregnancy And Lactation Text: These medications can lead to premature delivery and should be avoided during pregnancy. These medications are also present in breast milk in small amounts.
Benzoyl Peroxide Pregnancy And Lactation Text: This medication is Pregnancy Category C. It is unknown if benzoyl peroxide is excreted in breast milk.
Spironolactone Counseling: Patient advised regarding risks of diarrhea, abdominal pain, hyperkalemia, birth defects (for female patients), liver toxicity and renal toxicity. The patient may need blood work to monitor liver and kidney function and potassium levels while on therapy. The patient verbalized understanding of the proper use and possible adverse effects of spironolactone.  All of the patient's questions and concerns were addressed.
Elidel Counseling: Patient may experience a mild burning sensation during topical application. Elidel is not approved in children less than 2 years of age. There have been case reports of hematologic and skin malignancies in patients using topical calcineurin inhibitors although causality is questionable.
Enbrel Counseling:  I discussed with the patient the risks of etanercept including but not limited to myelosuppression, immunosuppression, autoimmune hepatitis, demyelinating diseases, lymphoma, and infections.  The patient understands that monitoring is required including a PPD at baseline and must alert us or the primary physician if symptoms of infection or other concerning signs are noted.
Niacinamide Pregnancy And Lactation Text: These medications are considered safe during pregnancy.
Methotrexate Pregnancy And Lactation Text: This medication is Pregnancy Category X and is known to cause fetal harm. This medication is excreted in breast milk.
Sotyktu Counseling:  I discussed the most common side effects of Sotyktu including: common cold, sore throat, sinus infections, cold sores, canker sores, folliculitis, and acne.  I also discussed more serious side effects of Sotyktu including but not limited to: serious allergic reactions; increased risk for infections such as TB; cancers such as lymphomas; rhabdomyolysis and elevated CPK; and elevated triglycerides and liver enzymes. 
Gabapentin Counseling: I discussed with the patient the risks of gabapentin including but not limited to dizziness, somnolence, fatigue and ataxia.
Soolantra Counseling: I discussed with the patients the risks of topial Soolantra. This is a medicine which decreases the number of mites and inflammation in the skin. You experience burning, stinging, eye irritation or allergic reactions.  Please call our office if you develop any problems from using this medication.
Use Enhanced Medication Counseling?: No
Bactrim Counseling:  I discussed with the patient the risks of sulfa antibiotics including but not limited to GI upset, allergic reaction, drug rash, diarrhea, dizziness, photosensitivity, and yeast infections.  Rarely, more serious reactions can occur including but not limited to aplastic anemia, agranulocytosis, methemoglobinemia, blood dyscrasias, liver or kidney failure, lung infiltrates or desquamative/blistering drug rashes.
Isotretinoin Pregnancy And Lactation Text: This medication is Pregnancy Category X and is considered extremely dangerous during pregnancy. It is unknown if it is excreted in breast milk.
Odomzo Counseling- I discussed with the patient the risks of Odomzo including but not limited to nausea, vomiting, diarrhea, constipation, weight loss, changes in the sense of taste, decreased appetite, muscle spasms, and hair loss.  The patient verbalized understanding of the proper use and possible adverse effects of Odomzo.  All of the patient's questions and concerns were addressed.
Metronidazole Counseling:  I discussed with the patient the risks of metronidazole including but not limited to seizures, nausea/vomiting, a metallic taste in the mouth, nausea/vomiting and severe allergy.
Minoxidil Counseling: Minoxidil is a topical medication which can increase blood flow where it is applied. It is uncertain how this medication increases hair growth. Side effects are uncommon and include stinging and allergic reactions.
Propranolol Pregnancy And Lactation Text: This medication is Pregnancy Category C and it isn't known if it is safe during pregnancy. It is excreted in breast milk.
Sarecycline Pregnancy And Lactation Text: This medication is Pregnancy Category D and not consider safe during pregnancy. It is also excreted in breast milk.
Spironolactone Pregnancy And Lactation Text: This medication can cause feminization of the male fetus and should be avoided during pregnancy. The active metabolite is also found in breast milk.
Siliq Counseling:  I discussed with the patient the risks of Siliq including but not limited to new or worsening depression, suicidal thoughts and behavior, immunosuppression, malignancy, posterior leukoencephalopathy syndrome, and serious infections.  The patient understands that monitoring is required including a PPD at baseline and must alert us or the primary physician if symptoms of infection or other concerning signs are noted. There is also a special program designed to monitor depression which is required with Siliq.
Winlevi Pregnancy And Lactation Text: This medication is considered safe during pregnancy and breastfeeding.
Prednisone Counseling:  I discussed with the patient the risks of prolonged use of prednisone including but not limited to weight gain, insomnia, osteoporosis, mood changes, diabetes, susceptibility to infection, glaucoma and high blood pressure.  In cases where prednisone use is prolonged, patients should be monitored with blood pressure checks, serum glucose levels and an eye exam.  Additionally, the patient may need to be placed on GI prophylaxis, PCP prophylaxis, and calcium and vitamin D supplementation and/or a bisphosphonate.  The patient verbalized understanding of the proper use and the possible adverse effects of prednisone.  All of the patient's questions and concerns were addressed.
Sotyktu Pregnancy And Lactation Text: There is insufficient data to evaluate whether or not Sotyktu is safe to use during pregnancy.   It is not known if Sotyktu passes into breast milk and whether or not it is safe to use when breastfeeding.  
High Dose Vitamin A Counseling: Side effects reviewed, pt to contact office should one occur.
Nsaids Counseling: NSAID Counseling: I discussed with the patient that NSAIDs should be taken with food. Prolonged use of NSAIDs can result in the development of stomach ulcers.  Patient advised to stop taking NSAIDs if abdominal pain occurs.  The patient verbalized understanding of the proper use and possible adverse effects of NSAIDs.  All of the patient's questions and concerns were addressed.
Azathioprine Pregnancy And Lactation Text: This medication is Pregnancy Category D and isn't considered safe during pregnancy. It is unknown if this medication is excreted in breast milk.
Carac Counseling:  I discussed with the patient the risks of Carac including but not limited to erythema, scaling, itching, weeping, crusting, and pain.
Topical Metronidazole Counseling: Metronidazole is a topical antibiotic medication. You may experience burning, stinging, redness, or allergic reactions.  Please call our office if you develop any problems from using this medication.
Bactrim Pregnancy And Lactation Text: This medication is Pregnancy Category D and is known to cause fetal risk.  It is also excreted in breast milk.
Doxepin Counseling:  Patient advised that the medication is sedating and not to drive a car after taking this medication. Patient informed of potential adverse effects including but not limited to dry mouth, urinary retention, and blurry vision.  The patient verbalized understanding of the proper use and possible adverse effects of doxepin.  All of the patient's questions and concerns were addressed.
Tetracycline Counseling: Patient counseled regarding possible photosensitivity and increased risk for sunburn.  Patient instructed to avoid sunlight, if possible.  When exposed to sunlight, patients should wear protective clothing, sunglasses, and sunscreen.  The patient was instructed to call the office immediately if the following severe adverse effects occur:  hearing changes, easy bruising/bleeding, severe headache, or vision changes.  The patient verbalized understanding of the proper use and possible adverse effects of tetracycline.  All of the patient's questions and concerns were addressed. Patient understands to avoid pregnancy while on therapy due to potential birth defects.
Tremfya Counseling: I discussed with the patient the risks of guselkumab including but not limited to immunosuppression, serious infections, and drug reactions.  The patient understands that monitoring is required including a PPD at baseline and must alert us or the primary physician if symptoms of infection or other concerning signs are noted.
Arava Counseling:  Patient counseled regarding adverse effects of Arava including but not limited to nausea, vomiting, abnormalities in liver function tests. Patients may develop mouth sores, rash, diarrhea, and abnormalities in blood counts. The patient understands that monitoring is required including LFTs and blood counts.  There is a rare possibility of scarring of the liver and lung problems that can occur when taking methotrexate. Persistent nausea, loss of appetite, pale stools, dark urine, cough, and shortness of breath should be reported immediately. Patient advised to discontinue Arava treatment and consult with a physician prior to attempting conception. The patient will have to undergo a treatment to eliminate Arava from the body prior to conception.
SSKI Counseling:  I discussed with the patient the risks of SSKI including but not limited to thyroid abnormalities, metallic taste, GI upset, fever, headache, acne, arthralgias, paraesthesias, lymphadenopathy, easy bleeding, arrhythmias, and allergic reaction.
Soolantra Pregnancy And Lactation Text: This medication is Pregnancy Category C. This medication is considered safe during breast feeding.
Adbry Counseling: I discussed with the patient the risks of tralokinumab including but not limited to eye infection and irritation, cold sores, injection site reactions, worsening of asthma, allergic reactions and increased risk of parasitic infection.  Live vaccines should be avoided while taking tralokinumab. The patient understands that monitoring is required and they must alert us or the primary physician if symptoms of infection or other concerning signs are noted.
Eucrisa Counseling: Patient may experience a mild burning sensation during topical application. Eucrisa is not approved in children less than 2 years of age.
Protopic Counseling: Patient may experience a mild burning sensation during topical application. Protopic is not approved in children less than 2 years of age. There have been case reports of hematologic and skin malignancies in patients using topical calcineurin inhibitors although causality is questionable.
Minoxidil Pregnancy And Lactation Text: This medication has not been assigned a Pregnancy Risk Category but animal studies failed to show danger with the topical medication. It is unknown if the medication is excreted in breast milk.
Cephalexin Counseling: I counseled the patient regarding use of cephalexin as an antibiotic for prophylactic and/or therapeutic purposes. Cephalexin (commonly prescribed under brand name Keflex) is a cephalosporin antibiotic which is active against numerous classes of bacteria, including most skin bacteria. Side effects may include nausea, diarrhea, gastrointestinal upset, rash, hives, yeast infections, and in rare cases, hepatitis, kidney disease, seizures, fever, confusion, neurologic symptoms, and others. Patients with severe allergies to penicillin medications are cautioned that there is about a 10% incidence of cross-reactivity with cephalosporins. When possible, patients with penicillin allergies should use alternatives to cephalosporins for antibiotic therapy.
Nsaids Pregnancy And Lactation Text: These medications are considered safe up to 30 weeks gestation. It is excreted in breast milk.
Topical Metronidazole Pregnancy And Lactation Text: This medication is Pregnancy Category B and considered safe during pregnancy.  It is also considered safe to use while breastfeeding.
Xeljanz Counseling: I discussed with the patient the risks of Xeljanz therapy including increased risk of infection, liver issues, headache, diarrhea, or cold symptoms. Live vaccines should be avoided. They were instructed to call if they have any problems.
Metronidazole Pregnancy And Lactation Text: This medication is Pregnancy Category B and considered safe during pregnancy.  It is also excreted in breast milk.
Humira Counseling:  I discussed with the patient the risks of adalimumab including but not limited to myelosuppression, immunosuppression, autoimmune hepatitis, demyelinating diseases, lymphoma, and serious infections.  The patient understands that monitoring is required including a PPD at baseline and must alert us or the primary physician if symptoms of infection or other concerning signs are noted.
VTAMA Counseling: I discussed with the patient that VTAMA is not for use in the eyes, mouth or mouth. They should call the office if they develop any signs of allergic reactions to VTAMA. The patient verbalized understanding of the proper use and possible adverse effects of VTAMA.  All of the patient's questions and concerns were addressed.
Adbry Pregnancy And Lactation Text: It is unknown if this medication will adversely affect pregnancy or breast feeding.
Glycopyrrolate Counseling:  I discussed with the patient the risks of glycopyrrolate including but not limited to skin rash, drowsiness, dry mouth, difficulty urinating, and blurred vision.
Cellcept Counseling:  I discussed with the patient the risks of mycophenolate mofetil including but not limited to infection/immunosuppression, GI upset, hypokalemia, hypercholesterolemia, bone marrow suppression, lymphoproliferative disorders, malignancy, GI ulceration/bleed/perforation, colitis, interstitial lung disease, kidney failure, progressive multifocal leukoencephalopathy, and birth defects.  The patient understands that monitoring is required including a baseline creatinine and regular CBC testing. In addition, patient must alert us immediately if symptoms of infection or other concerning signs are noted.
High Dose Vitamin A Pregnancy And Lactation Text: High dose vitamin A therapy is contraindicated during pregnancy and breast feeding.
Protopic Pregnancy And Lactation Text: This medication is Pregnancy Category C. It is unknown if this medication is excreted in breast milk when applied topically.
Simponi Counseling:  I discussed with the patient the risks of golimumab including but not limited to myelosuppression, immunosuppression, autoimmune hepatitis, demyelinating diseases, lymphoma, and serious infections.  The patient understands that monitoring is required including a PPD at baseline and must alert us or the primary physician if symptoms of infection or other concerning signs are noted.
Mirvaso Counseling: Mirvaso is a topical medication which can decrease superficial blood flow where applied. Side effects are uncommon and include stinging, redness and allergic reactions.
Sski Pregnancy And Lactation Text: This medication is Pregnancy Category D and isn't considered safe during pregnancy. It is excreted in breast milk.
Doxepin Pregnancy And Lactation Text: This medication is Pregnancy Category C and it isn't known if it is safe during pregnancy. It is also excreted in breast milk and breast feeding isn't recommended.
Topical Retinoid counseling:  Patient advised to apply a pea-sized amount only at bedtime and wait 30 minutes after washing their face before applying.  If too drying, patient may add a non-comedogenic moisturizer. The patient verbalized understanding of the proper use and possible adverse effects of retinoids.  All of the patient's questions and concerns were addressed.
Cephalexin Pregnancy And Lactation Text: This medication is Pregnancy Category B and considered safe during pregnancy.  It is also excreted in breast milk but can be used safely for shorter doses.
Calcipotriene Counseling:  I discussed with the patient the risks of calcipotriene including but not limited to erythema, scaling, itching, and irritation.
Vtama Pregnancy And Lactation Text: It is unknown if this medication can cause problems during pregnancy and breastfeeding.
Minocycline Counseling: Patient advised regarding possible photosensitivity and discoloration of the teeth, skin, lips, tongue and gums.  Patient instructed to avoid sunlight, if possible.  When exposed to sunlight, patients should wear protective clothing, sunglasses, and sunscreen.  The patient was instructed to call the office immediately if the following severe adverse effects occur:  hearing changes, easy bruising/bleeding, severe headache, or vision changes.  The patient verbalized understanding of the proper use and possible adverse effects of minocycline.  All of the patient's questions and concerns were addressed.
Cimzia Counseling:  I discussed with the patient the risks of Cimzia including but not limited to immunosuppression, allergic reactions and infections.  The patient understands that monitoring is required including a PPD at baseline and must alert us or the primary physician if symptoms of infection or other concerning signs are noted.
Dutasteride Male Counseling: Dustasteride Counseling:  I discussed with the patient the risks of use of dutasteride including but not limited to decreased libido, decreased ejaculate volume, and gynecomastia. Women who can become pregnant should not handle medication.  All of the patient's questions and concerns were addressed.
Detail Level: Zone
Cibinqo Counseling: I discussed with the patient the risks of Cibinqo therapy including but not limited to common cold, nausea, headache, cold sores, increased blood CPK levels, dizziness, UTIs, fatigue, acne, and vomitting. Live vaccines should be avoided.  This medication has been linked to serious infections; higher rate of mortality; malignancy and lymphoproliferative disorders; major adverse cardiovascular events; thrombosis; thrombocytopenia and lymphopenia; lipid elevations; and retinal detachment.
Glycopyrrolate Pregnancy And Lactation Text: This medication is Pregnancy Category B and is considered safe during pregnancy. It is unknown if it is excreted breast milk.
Xeljoyz Pregnancy And Lactation Text: This medication is Pregnancy Category D and is not considered safe during pregnancy.  The risk during breast feeding is also uncertain.
Topical Steroids Counseling: I discussed with the patient that prolonged use of topical steroids can result in the increased appearance of superficial blood vessels (telangiectasias), lightening (hypopigmentation) and thinning of the skin (atrophy).  Patient understands to avoid using high potency steroids in skin folds, the groin or the face.  The patient verbalized understanding of the proper use and possible adverse effects of topical steroids.  All of the patient's questions and concerns were addressed.
Olanzapine Counseling- I discussed with the patient the common side effects of olanzapine including but are not limited to: lack of energy, dry mouth, increased appetite, sleepiness, tremor, constipation, dizziness, changes in behavior, or restlessness.  Explained that teenagers are more likely to experience headaches, abdominal pain, pain in the arms or legs, tiredness, and sleepiness.  Serious side effects include but are not limited: increased risk of death in elderly patients who are confused, have memory loss, or dementia-related psychosis; hyperglycemia; increased cholesterol and triglycerides; and weight gain.
Xolair Counseling:  Patient informed of potential adverse effects including but not limited to fever, muscle aches, rash and allergic reactions.  The patient verbalized understanding of the proper use and possible adverse effects of Xolair.  All of the patient's questions and concerns were addressed.
Hydroxyzine Counseling: Patient advised that the medication is sedating and not to drive a car after taking this medication.  Patient informed of potential adverse effects including but not limited to dry mouth, urinary retention, and blurry vision.  The patient verbalized understanding of the proper use and possible adverse effects of hydroxyzine.  All of the patient's questions and concerns were addressed.
Thalidomide Counseling: I discussed with the patient the risks of thalidomide including but not limited to birth defects, anxiety, weakness, chest pain, dizziness, cough and severe allergy.
Zoryve Counseling:  I discussed with the patient that Zoryve is not for use in the eyes, mouth or vagina. The most commonly reported side effects include diarrhea, headache, insomnia, application site pain, upper respiratory tract infections, and urinary tract infections.  All of the patient's questions and concerns were addressed.
Calcipotriene Pregnancy And Lactation Text: The use of this medication during pregnancy or lactation is not recommended as there is insufficient data.
Hydroquinone Counseling:  Patient advised that medication may result in skin irritation, lightening (hypopigmentation), dryness, and burning.  In the event of skin irritation, the patient was advised to reduce the amount of the drug applied or use it less frequently.  Rarely, spots that are treated with hydroquinone can become darker (pseudoochronosis).  Should this occur, patient instructed to stop medication and call the office. The patient verbalized understanding of the proper use and possible adverse effects of hydroquinone.  All of the patient's questions and concerns were addressed.
Qbrexza Counseling:  I discussed with the patient the risks of Qbrexza including but not limited to headache, mydriasis, blurred vision, dry eyes, nasal dryness, dry mouth, dry throat, dry skin, urinary hesitation, and constipation.  Local skin reactions including erythema, burning, stinging, and itching can also occur.
Clofazimine Counseling:  I discussed with the patient the risks of clofazimine including but not limited to skin and eye pigmentation, liver damage, nausea/vomiting, gastrointestinal bleeding and allergy.
Cyclophosphamide Counseling:  I discussed with the patient the risks of cyclophosphamide including but not limited to hair loss, hormonal abnormalities, decreased fertility, abdominal pain, diarrhea, nausea and vomiting, bone marrow suppression and infection. The patient understands that monitoring is required while taking this medication.
Ilumya Counseling: I discussed with the patient the risks of tildrakizumab including but not limited to immunosuppression, malignancy, posterior leukoencephalopathy syndrome, and serious infections.  The patient understands that monitoring is required including a PPD at baseline and must alert us or the primary physician if symptoms of infection or other concerning signs are noted.
Fluconazole Counseling:  Patient counseled regarding adverse effects of fluconazole including but not limited to headache, diarrhea, nausea, upset stomach, liver function test abnormalities, taste disturbance, and stomach pain.  There is a rare possibility of liver failure that can occur when taking fluconazole.  The patient understands that monitoring of LFTs and kidney function test may be required, especially at baseline. The patient verbalized understanding of the proper use and possible adverse effects of fluconazole.  All of the patient's questions and concerns were addressed.
Cantharidin Counseling:  I discussed with the patient the risks of Cantharidin including but not limited to pain, redness, burning, itching, and blistering.
Topical Steroids Applications Pregnancy And Lactation Text: Most topical steroids are considered safe to use during pregnancy and lactation.  Any topical steroid applied to the breast or nipple should be washed off before breastfeeding.
Olanzapine Pregnancy And Lactation Text: This medication is pregnancy category C.   There are no adequate and well controlled trials with olanzapine in pregnant females.  Olanzapine should be used during pregnancy only if the potential benefit justifies the potential risk to the fetus.   In a study in lactating healthy women, olanzapine was excreted in breast milk.  It is recommended that women taking olanzapine should not breast feed.
Clindamycin Counseling: I counseled the patient regarding use of clindamycin as an antibiotic for prophylactic and/or therapeutic purposes. Clindamycin is active against numerous classes of bacteria, including skin bacteria. Side effects may include nausea, diarrhea, gastrointestinal upset, rash, hives, yeast infections, and in rare cases, colitis.
Hydroxyzine Pregnancy And Lactation Text: This medication is not safe during pregnancy and should not be taken. It is also excreted in breast milk and breast feeding isn't recommended.
Tazorac Counseling:  Patient advised that medication is irritating and drying.  Patient may need to apply sparingly and wash off after an hour before eventually leaving it on overnight.  The patient verbalized understanding of the proper use and possible adverse effects of tazorac.  All of the patient's questions and concerns were addressed.
Xolair Pregnancy And Lactation Text: This medication is Pregnancy Category B and is considered safe during pregnancy. This medication is excreted in breast milk.
Hydroxychloroquine Counseling:  I discussed with the patient that a baseline ophthalmologic exam is needed at the start of therapy and every year thereafter while on therapy. A CBC may also be warranted for monitoring.  The side effects of this medication were discussed with the patient, including but not limited to agranulocytosis, aplastic anemia, seizures, rashes, retinopathy, and liver toxicity. Patient instructed to call the office should any adverse effect occur.  The patient verbalized understanding of the proper use and possible adverse effects of Plaquenil.  All the patient's questions and concerns were addressed.
Ketoconazole Counseling:   Patient counseled regarding improving absorption with orange juice.  Adverse effects include but are not limited to breast enlargement, headache, diarrhea, nausea, upset stomach, liver function test abnormalities, taste disturbance, and stomach pain.  There is a rare possibility of liver failure that can occur when taking ketoconazole. The patient understands that monitoring of LFTs may be required, especially at baseline. The patient verbalized understanding of the proper use and possible adverse effects of ketoconazole.  All of the patient's questions and concerns were addressed.
Cibinqo Pregnancy And Lactation Text: It is unknown if this medication will adversely affect pregnancy or breast feeding.  You should not take this medication if you are currently pregnant or planning a pregnancy or while breastfeeding.
Aklief counseling:  Patient advised to apply a pea-sized amount only at bedtime and wait 30 minutes after washing their face before applying.  If too drying, patient may add a non-comedogenic moisturizer.  The most commonly reported side effects including irritation, redness, scaling, dryness, stinging, burning, itching, and increased risk of sunburn.  The patient verbalized understanding of the proper use and possible adverse effects of retinoids.  All of the patient's questions and concerns were addressed.
Dutasteride Pregnancy And Lactation Text: This medication is absolutely contraindicated in women, especially during pregnancy and breast feeding. Feminization of male fetuses is possible if taking while pregnant.
Cimzia Pregnancy And Lactation Text: This medication crosses the placenta but can be considered safe in certain situations. Cimzia may be excreted in breast milk.
Cantharidin Pregnancy And Lactation Text: This medication has not been proven safe during pregnancy. It is unknown if this medication is excreted in breast milk.
Quinolones Counseling:  I discussed with the patient the risks of fluoroquinolones including but not limited to GI upset, allergic reaction, drug rash, diarrhea, dizziness, photosensitivity, yeast infections, liver function test abnormalities, tendonitis/tendon rupture.
Skyrizi Counseling: I discussed with the patient the risks of risankizumab-rzaa including but not limited to immunosuppression, and serious infections.  The patient understands that monitoring is required including a PPD at baseline and must alert us or the primary physician if symptoms of infection or other concerning signs are noted.
Qbrexza Pregnancy And Lactation Text: There is no available data on Qbrexza use in pregnant women.  There is no available data on Qbrexza use in lactation.
Acitretin Counseling:  I discussed with the patient the risks of acitretin including but not limited to hair loss, dry lips/skin/eyes, liver damage, hyperlipidemia, depression/suicidal ideation, photosensitivity.  Serious rare side effects can include but are not limited to pancreatitis, pseudotumor cerebri, bony changes, clot formation/stroke/heart attack.  Patient understands that alcohol is contraindicated since it can result in liver toxicity and significantly prolong the elimination of the drug by many years.
Opzelura Counseling:  I discussed with the patient the risks of Opzelura including but not limited to nasopharngitis, bronchitis, ear infection, eosinophila, hives, diarrhea, folliculitis, tonsillitis, and rhinorrhea.  Taken orally, this medication has been linked to serious infections; higher rate of mortality; malignancy and lymphoproliferative disorders; major adverse cardiovascular events; thrombosis; thrombocytopenia, anemia, and neutropenia; and lipid elevations.
Oral Minoxidil Counseling- I discussed with the patient the risks of oral minoxidil including but not limited to shortness of breath, swelling of the feet or ankles, dizziness, lightheadedness, unwanted hair growth and allergic reaction.  The patient verbalized understanding of the proper use and possible adverse effects of oral minoxidil.  All of the patient's questions and concerns were addressed.

## 2023-10-02 NOTE — PROCEDURE: INTRALESIONAL KENALOG
Lot # For Kenalog (Optional): [id number]
Medical Necessity Clause: This procedure was medically necessary because the lesions that were treated were:
X Size Of Lesion In Cm (Optional): 0
Require Ndc Code?: No
Administered By (Optional): Dr. Olman Eugene
Total Volume (Ccs): 0.5
Kenalog Preparation: Kenalog
Consent: The risks of atrophy were reviewed with the patient.
Concentration Of Kenalog Solution Injected (Mg/Ml): 10.0
Expiration Date For Kenalog (Optional): 08/2024
Validate Note Data When Using Inventory: Yes
Ndc# For Kenalog Only: 3204-5455-64
Treatment Number (Optional): 1
Detail Level: Detailed
Kenalog Type Of Vial: Multiple Dose

## 2023-10-04 NOTE — PROGRESS NOTES
[facility]  New patient visit    Patient name: Celestine Calderón  YOB: 1990  MRN: [id number]  Referring provider: EDILSON Dewitt  [address] [place] [address]   Date of visit: 10/5/2023     CC: Seizure      HPI:    Celestine Calderón is a 33 y.o. woman who is referred for an initial neurologic consultation for seizures.     She is accompanied by her significant other who supplements the history.    She had initially presented in September 2022 with multiple urgent care visits for flu-like symptoms and headache. She was discharged multiple times until her  one morning several days later heard odd noises coming from their bedroom, went to the bathroom and witnessed her having an apparent generalized tonic clonic seizure.     She had a post-ictal period and does not recall EMS arriving or being transported to [facility] via ambulance. Workup was notable for CSF studies c/w a viral versus aseptic meningitis. Virus was not identified ultimately, and autoimmune antibody panel was negative. She improved with supportive care and was discharged on 9/9/22 after being admitted on 9/6/2022.     Keppra was started for seizure treatment and she had a routine EEG during the inpatient stay which showed intermittent runs of rhythmic slowing involving the temporal region. MRI Brain showed abnormal signal in the left temporal lobe.     She had another seizure on the way home from the hospital. After discharge, she had a period of time being poorly responsive,     She has been following on an outpatient basis with Dr. Bland and is seeking a second opinion for more detailed explanation of the findings and rationale of treatment.     Essentially, she was altered after the hospitalization with poor balance, barely able to sit up in a chair at the time of follow up visits in October 2022. She had word finding difficulty that has improved gradually. Keppra dose was increased and  "Lyrica was added for seizure control and for pain, and her mental status improved significantly.     Medications reviewed in detail: Lyrica was started at 50 mg BID, increased to 75 mg BID. This was primarily for pain/headaches rather than seizures. The pain has improved.  After the hospitalization the Keppra was increased from 1000 mg BID to 1000 mg TID.     EEG was done last Friday, results not available for review.     She has occasional feelings of being in a fog and \"out of body\". She has not had any clinical seizures on current regimen.       Mood: stable, talks to friend who is therapy     Side effects: none identified, though irritability may be related to Keppra    Barriers to taking medication appropriately: no    Driving: yes    Vitamin D: not taking    Women's issues in epilepsy: planning future pregnancy. IUD removed late July 2022. At the beginning of August she had initially thought she was pregnant prior to menses returning. The couple was planning pregnancy prior to the viral encephalitis.         Past Medical History:   Past Medical History:   Diagnosis Date    Epilepsy (HCC)     Viral encephalitis        Past Surgical History:   Past Surgical History:   Procedure Laterality Date    TONSILLECTOMY  7/9/08    Performed by NATHAN ROBERTSON at SURGERY SAME DAY [facility] ORS    ETHMOIDECTOMY  7/9/08    Performed by NATHAN ROBERTSON at SURGERY SAME DAY [facility] ORS    ANTROSTOMY  7/9/08    Performed by NATHAN ROBERTSON at SURGERY SAME DAY [facility] ORS    TURBINOPLASTY  7/9/08    Performed by NATHAN ROBERTSON at SURGERY SAME DAY [facility] ORS    OTHER SURGICAL PROCEDURE      benign lymph node removed, right groin, 2005       Social History:   Social History     Socioeconomic History    Marital status:      Spouse name: Not on file    Number of children: Not on file    Years of education: Not on file    Highest education level: 12th grade   Occupational History    Not on file   Tobacco Use "    Smoking status: Never    Smokeless tobacco: Never   Vaping Use    Vaping Use: Never used   Substance and Sexual Activity    Alcohol use: Not Currently    Drug use: Never    Sexual activity: Not on file   Other Topics Concern    Not on file   Social History Narrative    Not on file     Social Determinants of Health     Financial Resource Strain: Low Risk  (8/23/2022)    Overall Financial Resource Strain (CARDIA)     Difficulty of Paying Living Expenses: Not very hard   Food Insecurity: No Food Insecurity (8/23/2022)    Hunger Vital Sign     Worried About Running Out of Food in the Last Year: Never true     Ran Out of Food in the Last Year: Never true   Transportation Needs: No Transportation Needs (8/23/2022)    PRAPARE - Transportation     Lack of Transportation (Medical): No     Lack of Transportation (Non-Medical): No   Physical Activity: Sufficiently Active (8/23/2022)    Exercise Vital Sign     Days of Exercise per Week: 3 days     Minutes of Exercise per Session: 50 min   Stress: No Stress Concern Present (8/23/2022)    [employer] [place] [employer] - Occupational Stress Questionnaire     Feeling of Stress : Not at all   Social Connections: Unknown (8/23/2022)    Social Connection and Isolation Panel [NHANES]     Frequency of Communication with Friends and Family: Patient refused     Frequency of Social Gatherings with Friends and Family: Once a week     Attends Church Services: Patient refused     Active Member of Clubs or Organizations: No     Attends Club or Organization Meetings: Never     Marital Status:    Intimate Partner Violence: Not on file   Housing Stability: Low Risk  (8/23/2022)    Housing Stability Vital Sign     Unable to Pay for Housing in the Last Year: No     Number of Places Lived in the Last Year: 1     Unstable Housing in the Last Year: No       Family Hx: History reviewed. No pertinent family history.    Current Medications:   Current Outpatient Medications:      doxycycline (VIBRAMYCIN) 100 MG Tab, Take 1 Tablet by mouth 2 times a day for 7 days., Disp: 14 Tablet, Rfl: 0    pregabalin (LYRICA) 100 MG Cap, Take 100 mg by mouth 2 times a day., Disp: , Rfl:     levetiracetam (KEPPRA) 1000 MG tablet, Take 1 Tablet by mouth 2 times a day. (Patient taking differently: Take 1,000 mg by mouth 3 times a day.), Disp: 60 Tablet, Rfl: 0    Allergies:   Allergies   Allergen Reactions    Penicillins Hives     Family member states reaction occurred in childhood.    Penicillin G          Physical Exam:   Ambulatory Vitals  Vitals:    10/05/23 1003   BP: 102/62   Pulse: 81   Temp: 36.6 °C (97.8 °F)   SpO2: 98%       Constitutional: Well-developed, well-nourished, good hygiene. Appears stated age.  Respiratory: normal respiratory effort  Skin: Warm, dry, intact. No rashes observed.  Neurologic:   Mental Status: Awake, alert, oriented x 3.   Speech: Fluent with normal prosody.   Memory: Able to recall recent and remote events accurately.    Concentration: Attentive. Able to focus on history and follow multi-step commands.   Fund of Knowledge: Appropriate.   Cranial Nerves:    CN II: PERRL     CN III, IV, VI: EOMI without nystagmus    CN V: Facial sensation intact and symmetric in all 3 trigeminal distributions    CN VII: No facial asymmetry    CN VIII: Hearing intact to voice    CN IX and X: Palate elevates symmetrically, gag reflex not tested    CN XI: Symmetric shoulder shrug     CN XII: Tongue midline   Motor: 5/5 in upper and lower extremities bilaterally   Sensory: Intact light touch, vibration and temperature diffusely    Coordination: No evidence of past-pointing on finger to nose testing, no dysdiadochokinesia. Heel to shin intact.    DTR's: 2+ throughout without clonus.    Babinski: Toes downgoing bilaterally.   Gait: ambulates steadily without assistive device. Romberg negative. Able to perform tandem gait.    Movements: No resting tremors or abnormal movements observed.    Musculoskeletal:    Strength: as above   Tone: Normal bulk and tone   Joints: No swelling    Studies:      Labs reviewed: CSF autoimmune encephalopathy antibodies negative    Result Name               Result    Unit    Reference Value   ------------------------------------------------------------   AMPA-R Ab CBA, CSF        Negative          Negative    [1]   ------------------------------------------------------------   Amphiphysin Ab, CSF       Negative          Negative    [1]   ------------------------------------------------------------   AGNA-1, CSF               Negative          Negative    [1]   ------------------------------------------------------------   JESSICA-1, CSF               Negative          Negative    [1]   ------------------------------------------------------------   JESSICA-2, CSF               Negative          Negative    [1]   ------------------------------------------------------------   JESSICA-3, CSF               Negative          Negative    [1]   ------------------------------------------------------------   CASPR2-IgG CBA, CSF       Negative          Negative    [1]   ------------------------------------------------------------   CRMP-5-IgG, CSF           Negative          Negative    [1]   ------------------------------------------------------------   DPPX Ab IFA, CSF          Negative          Negative    [1]   ------------------------------------------------------------   OSEI-B-R Ab CBA, CSF      Negative          Negative    [1]   ------------------------------------------------------------   GAD65 Ab Assay, CSF       0.00      nmol/L  <=0.02      [1]   ------------------------------------------------------------   GFAP IFA, CSF             Negative          Negative    [1]   ------------------------------------------------------------   IgLON5 IFA, CSF           Negative          Negative    [1]   ------------------------------------------------------------   LGI1-IgG CBA, CSF          Negative          Negative    [1]   ------------------------------------------------------------   mGluR1 Ab IFA, CSF        Negative          Negative    [1]   ------------------------------------------------------------   Neurochondrin IFA, CSF    Negative          Negative    [1]   ------------------------------------------------------------   NIF IFA, CSF              Negative          Negative    [1]   ------------------------------------------------------------   NMDA-R Ab CBA, CSF        Negative          Negative    [1]   ------------------------------------------------------------   PCA-Tr, CSF               Negative          Negative    [1]   ------------------------------------------------------------   PCA-1, CSF                Negative          Negative    [1]   ------------------------------------------------------------   PCA-2, CSF                Negative          Negative    [1]   ------------------------------------------------------------   Septin-7 IFA, CSF         Negative          Negative    [1]   ------------------------------------------------------------  CSF studies   Ref Range & Units 6 mo ago  (3/20/23) 1 yr ago  (9/6/22) 1 yr ago  (9/6/22)   Number Of Tubes  4  4  4    Volume mL 8.0  6.0  6.0    Color-Body Fluid  Colorless  Colorless  Colorless    Character-Body Fluid  Clear  Clear  Clear    Supernatant Appearance  Colorless  Colorless  Colorless    Total RBC Count cells/uL 1  2  3    Crenated RBC % 0  0  0    Total WBC Count 0 - 10 cells/uL 4  343 High   272 High        Imaging:       MRI Brain wwo contrast 8/9/2023  IMPRESSION:     Stable abnormal diffuse infiltrating T2 hyperintensity noted involving left temporal lobe without contrast enhancement. The differential diagnosis includes gliosis secondary to the encephalitis and low-grade glioma.    MRI Brain wwo contrast 2/16/23          EEG Results:     Routine EEG 9/12/22  INTERPRETATION:   Abnormal video EEG recording in the  awake, drowsy, and sleep state(s):  - Mild to moderate background slowing suggestive of diffuse/multifocal cerebral dysfunction and consistent with a nonspecific encephalopathy.  - Intermittent runs of generalized 1.5 to 2.5 Hz rhythmic or quasirhythmic slowing was noted, often asymmetrical and dyssynchronous.  Occasionally generalized slowing was followed by rhythmic left temporal maximal l 1 to 2 Hz rhythmic activity.  These findings may reflect a mild encephalopathy as noted above.  However the dyssynchronous generalized slowing and at times focal nature of the slowing over the left temporal lobe may suggest underlying epileptogenic potential with an increased risk for seizures.  However, no seizures were seen.  Clinical and radiographic correlation recommended.  -There is ongoing concern for seizure activity consider longer-term EEG monitoring .      Assessment/Plan:   Celestine Calderón is a 33 y.o. 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   She has been followed at [facility] neurolog[facility] and is seeking to establish care here for ongoing treatment.     She has been free of clinical seizures on Keppra 1000 mg 3 times daily.  A repeat MRI from August 2023 shows ongoing T2 hyperintensity involving the left temporal lobe as compared to the initial MRI from February 2023.  In the differential from radiology, low-grade glioma is mentioned as a possibility.  I have a lower suspicion for this given she had a history of viral encephalitis with EEG and imaging concordant at the time of the hospitalization.    Lyrica was started and increased for pain/headache.  Patient would favor trying to come off of the medication and since she is no longer experiencing headache and pain.  This is unlikely to be providing a significant benefit in terms of protecting from seizures.  I have counseled her to wean the medication and for her  to report to me if there  are any signs of clinical seizures or change in mental status that would raise concern for seizure.  If she does have such symptoms, I would recommend that she restart this medication.    We discussed driving.  No contraindication for driving since she has been seizure-free for over 3 months.    Recommended folate and vitamin D supplementation.     In order to characterize her interictal EEG and rule out subclinical nighttime seizures, I have ordered an ambulatory 24-hour EEG.    She reports irritability, which could be a side effect of the Keppra.  Going forward, I will have a low threshold to cross titrate her to a different antiseizure medication.    We discussed epilepsy and women's health, and preconception counseling was provided.  Of the medication options, Keppra and lamotrigine are the safest, as well as oxcarbazepine according to recent data.  Continue Keppra for now.    Follow-up in approximately 8 weeks, or sooner if needed.    Brittney Sweet M.D.   Diplomate, Neurology with Special Qualification in Epilepsy, American Board of Psychiatry and Neurology   of Clinical Neurology, [facility] [employer]  Level III Epilepsy Center, Department of Neurology at [facility]   10/4/2023      During today's encounter we discussed available treatment options and their individual side effect profiles. Total encounter time caring for patient today 90 minutes.

## 2023-10-05 ENCOUNTER — OFFICE VISIT (OUTPATIENT)
Dept: NEUROLOGY | Facility: MEDICAL CENTER | Age: 33
End: 2023-10-05
Attending: STUDENT IN AN ORGANIZED HEALTH CARE EDUCATION/TRAINING PROGRAM
Payer: COMMERCIAL

## 2023-10-05 VITALS
TEMPERATURE: 97.8 F | DIASTOLIC BLOOD PRESSURE: 62 MMHG | SYSTOLIC BLOOD PRESSURE: 102 MMHG | WEIGHT: 151.46 LBS | OXYGEN SATURATION: 98 % | HEART RATE: 81 BPM | HEIGHT: 64 IN | BODY MASS INDEX: 25.86 KG/M2

## 2023-10-05 DIAGNOSIS — Z31.69 ENCOUNTER FOR PRECONCEPTION CONSULTATION: ICD-10-CM

## 2023-10-05 DIAGNOSIS — R93.0 ABNORMAL MRI OF HEAD: ICD-10-CM

## 2023-10-05 DIAGNOSIS — G40.109 LOCALIZATION-RELATED EPILEPSY (HCC): ICD-10-CM

## 2023-10-05 DIAGNOSIS — A86 VIRAL ENCEPHALITIS: ICD-10-CM

## 2023-10-05 PROCEDURE — 99417 PROLNG OP E/M EACH 15 MIN: CPT | Performed by: STUDENT IN AN ORGANIZED HEALTH CARE EDUCATION/TRAINING PROGRAM

## 2023-10-05 PROCEDURE — 3078F DIAST BP <80 MM HG: CPT | Performed by: STUDENT IN AN ORGANIZED HEALTH CARE EDUCATION/TRAINING PROGRAM

## 2023-10-05 PROCEDURE — 99211 OFF/OP EST MAY X REQ PHY/QHP: CPT | Performed by: STUDENT IN AN ORGANIZED HEALTH CARE EDUCATION/TRAINING PROGRAM

## 2023-10-05 PROCEDURE — 99205 OFFICE O/P NEW HI 60 MIN: CPT | Performed by: STUDENT IN AN ORGANIZED HEALTH CARE EDUCATION/TRAINING PROGRAM

## 2023-10-05 PROCEDURE — 1126F AMNT PAIN NOTED NONE PRSNT: CPT | Performed by: STUDENT IN AN ORGANIZED HEALTH CARE EDUCATION/TRAINING PROGRAM

## 2023-10-05 PROCEDURE — 3074F SYST BP LT 130 MM HG: CPT | Performed by: STUDENT IN AN ORGANIZED HEALTH CARE EDUCATION/TRAINING PROGRAM

## 2023-10-05 ASSESSMENT — FIBROSIS 4 INDEX: FIB4 SCORE: 0.85

## 2023-10-05 ASSESSMENT — PAIN SCALES - GENERAL: PAINLEVEL: NO PAIN

## 2023-10-05 NOTE — PATIENT INSTRUCTIONS
-Decrease Lyrica to 50 mg twice daily for 1 week  -Decrease to 50 mg daily for 1 week  -Then stop      Please let our office know if you have any changes in your seizure frequency and/characteristics. Otherwise, please keep the diary of your events and bring it with you at the time of your next follow up visit with our office.     Please take vitamin D3 [id number] internation units daily.     Please take folic acid 1 mg daily. This is an over-the-counter supplement that is recommended to prevent certain developmental problems in your baby, in case you become pregnant in the future.    Please let our office know as soon as you become pregnant or plan to become pregnant.    If you are caring for a baby/young child, please make sure to be sitting on a soft surface while holding your baby/young child, so in case you have a seizure, your baby/young child is not injured due to fall.     Please let us know if you observe that your baby is excessively sleepy/has other changes and the pediatrician feels that there are no other explanations except possible adverse effects of antiseizure medication(s) your are taking while nursing your baby.     Please note that the following might precipitate seizures: missed doses of antiseizure medications, being sick with fever, stress, fatigue, sleep deprivation, not eating regularly, not drinking enough water, drinking too much alcohol, stopping alcohol suddenly if you are currently using it on a regular/daily basis, and/or using recreational drugs, among others.    Please note that the following might lead to an injury, potentially a life-threatening injury, in case you have a seizure and/or lose awareness while:   - being in a large body of water by yourself, such as bath, pool, lake, ocean, among others (risk of drowning)   - being on unprotected heights (risk of fall)   - being around and/or operating heavy machinery (risk of injury)   - being around open fire/hot surfaces (risk of  burns)   - any other activities/circumstances, in which if you lose awareness, you might injure yourself and/or others.    Please call for help (crisis line and/or 911) in case you have thoughts of harming yourself and/or others.    Please abstain from driving until further notice.    ------------------------------------------------------------------------------------------  Instructions for your family/caregivers:  Please call 911 if the patient has a seizure longer than 2-3 minutes, if seizures are back to back without her recovering to her baseline, or she does not start recovering within 5-10 minutes after the seizure stops. During the seizure - please turn her on her side, please make sure her head is protected (for example, you should put a pillow under her head, if one is available), and please do not put anything in her mouth.   -------------------------------------------------------------------------------------------    It is important that your seizures are well controlled and you have none or have them rarely. In addition to avoiding injury related to breakthrough seizures, frequent seizures increase risk of SUDEP (sudden unexpected death in epilepsy), where a person goes into a seizure and then never wakes up - this is a rare complication of seizure disorder; one of the best ways to prevent it is to control your seizures well.     Due to the high volume of patients we are trying to help, your physician will not be able to respond by phone or in [place] to your routine concerns between appointments.  This does not reflect a lack of interest or concern for you or your diagnosis.  Please bring these questions and concerns to your appointment where your physician can answer.  Please relay more pressing concerns to our office, either via MyChart, or by phone; if not able to reach us please visit nearby Urgent Care Center or Emergency Department.  If any emergent medical needs, please seek emergent medical  help and/or call 911.    Please note that we are not able to fill out paperwork that might be related to your work, utility company, disability, and/or driving, among others, in between the visits.  Please schedule a dedicated appointment to address your paperwork, so we can do that in a timely manner.  This is not due to lack of concern or interest for your disease-related work/administrative problems, but to make sure that we provide the best possible care and to fill out your paperwork in a correct and timely manner.    Thank you for entrusting your neurological care to [facility] and we look forward to continuing to serve you.

## 2023-11-09 ENCOUNTER — NON-PROVIDER VISIT (OUTPATIENT)
Dept: NEUROLOGY | Facility: MEDICAL CENTER | Age: 33
End: 2023-11-09
Attending: STUDENT IN AN ORGANIZED HEALTH CARE EDUCATION/TRAINING PROGRAM
Payer: COMMERCIAL

## 2023-11-09 DIAGNOSIS — G40.109 LOCALIZATION-RELATED EPILEPSY (HCC): ICD-10-CM

## 2023-11-09 PROCEDURE — 95700 EEG CONT REC W/VID EEG TECH: CPT | Performed by: STUDENT IN AN ORGANIZED HEALTH CARE EDUCATION/TRAINING PROGRAM

## 2023-11-09 PROCEDURE — 95708 EEG WO VID EA 12-26HR UNMNTR: CPT | Performed by: STUDENT IN AN ORGANIZED HEALTH CARE EDUCATION/TRAINING PROGRAM

## 2023-11-09 PROCEDURE — 95719 EEG PHYS/QHP EA INCR W/O VID: CPT | Performed by: STUDENT IN AN ORGANIZED HEALTH CARE EDUCATION/TRAINING PROGRAM

## 2023-11-09 NOTE — PROCEDURES
AMBULATORY ELECTROENCEPHALOGRAM REPORT      REFERRING PROVIDER:  Brittney Sweet M.D.  [address] [address]  [place] [id number]  DOS: 11/09/2023   DURATION OF RECORDING: (21 hours and 53 minutes)    INDICATION:  Celestine Calderón 33 y.o. female presenting with  seizure(s)    CURRENT OUTPATIENT MEDICATION LIST:   Current Outpatient Medications   Medication Instructions    levetiracetam (KEPPRA) 1,000 mg, Oral, 2 TIMES DAILY    pregabalin (LYRICA) 100 mg, Oral, 2 TIMES DAILY       TECHNIQUE:   The EEG was set up and taken down by a Neurodiagnostic technologist who performed education to patient and staff.  A minimum but not limited to 23 electrodes and 23 channel recording was setup and performed by Neurodiagnostic technologist. Impedances, electrode integrity, and technical impressions were documented a minimum of every 2-24 hour period by a Neurodiagnostic Technologist and reviewed by Interpreting Physician.    DESCRIPTION OF THE RECORD:  During maximal wakefulness, the background was continuous and showed a 10 Hz posterior dominant rhythm.  Reactivity and state changes were present.  During drowsiness, theta/delta frequencies were seen.    Sleep was captured and was characterized by diffuse background delta/theta activity with a loss of myogenic artifact.  N2 sleep transients in the form of sleep spindles and vertex waves were seen in the leads over the central regions.     ICTAL AND INTERICTAL FINDINGS:   No focal or generalized epileptiform activity noted.     No regional slowing or persistent focal asymmetries were seen.    No seizures.    ACTIVATION PROCEDURES:   Hyperventilation was performed by the patient for a total of 3 minutes. The technician performing the test noted good effort. This technique did not elicited any abnormalities on EEG.  and Intermittent Photic stimulation was performed in a stepwise fashion from 1 to 30 Hz and did not elicited any abnormalities on EEG.     EKG: Sampling of the EKG  recording showed sinus rhythm    EVENTS:  Push button events and/or ambulatory diary events: no intentional push button events.   Events listed in diary: 1:00 PM lower back pain, 3:10 PM tired, 8:05 PM - headache/tired, 6:50 AM - headache, back pain. No abnormal electrographic correlates.     INTERPRETATION:  Normal ambulatory EEG recording in the awake and drowsy/sleep state(s):  -No regional slowing or persistent focal asymmetries were seen.  -No epileptiform discharges or other epileptiform phenomena seen.  -No seizures. Clinical correlation is recommended.  -Clinical Events: events listed were not of the patient's typical seizure semiology, no abnormal epileptiform correlates.         Brittney Sweet M.D.   Diplomate, Neurology with Special Qualification in Epilepsy, American Board of Psychiatry and Neurology   of Clinical Neurology, [facility]  Level III Epilepsy Center, Department of Neurology at [facility]

## 2023-11-10 ENCOUNTER — NON-PROVIDER VISIT (OUTPATIENT)
Dept: NEUROLOGY | Facility: MEDICAL CENTER | Age: 33
End: 2023-11-10
Attending: STUDENT IN AN ORGANIZED HEALTH CARE EDUCATION/TRAINING PROGRAM
Payer: COMMERCIAL

## 2023-11-10 PROCEDURE — 99999 PR NO CHARGE: CPT | Performed by: STUDENT IN AN ORGANIZED HEALTH CARE EDUCATION/TRAINING PROGRAM

## 2023-12-12 ENCOUNTER — TELEPHONE (OUTPATIENT)
Dept: NEUROLOGY | Facility: MEDICAL CENTER | Age: 33
End: 2023-12-12

## 2023-12-12 ENCOUNTER — OFFICE VISIT (OUTPATIENT)
Dept: NEUROLOGY | Facility: MEDICAL CENTER | Age: 33
End: 2023-12-12
Attending: INTERNAL MEDICINE
Payer: COMMERCIAL

## 2023-12-12 VITALS
SYSTOLIC BLOOD PRESSURE: 122 MMHG | HEIGHT: 64 IN | OXYGEN SATURATION: 100 % | WEIGHT: 146.16 LBS | BODY MASS INDEX: 24.95 KG/M2 | HEART RATE: 95 BPM | DIASTOLIC BLOOD PRESSURE: 64 MMHG | TEMPERATURE: 97.3 F

## 2023-12-12 DIAGNOSIS — R56.9 SEIZURE (HCC): ICD-10-CM

## 2023-12-12 DIAGNOSIS — R63.4 WEIGHT LOSS: ICD-10-CM

## 2023-12-12 PROCEDURE — 99211 OFF/OP EST MAY X REQ PHY/QHP: CPT | Performed by: STUDENT IN AN ORGANIZED HEALTH CARE EDUCATION/TRAINING PROGRAM

## 2023-12-12 PROCEDURE — 99214 OFFICE O/P EST MOD 30 MIN: CPT | Performed by: STUDENT IN AN ORGANIZED HEALTH CARE EDUCATION/TRAINING PROGRAM

## 2023-12-12 PROCEDURE — 3074F SYST BP LT 130 MM HG: CPT | Performed by: STUDENT IN AN ORGANIZED HEALTH CARE EDUCATION/TRAINING PROGRAM

## 2023-12-12 PROCEDURE — 3078F DIAST BP <80 MM HG: CPT | Performed by: STUDENT IN AN ORGANIZED HEALTH CARE EDUCATION/TRAINING PROGRAM

## 2023-12-12 RX ORDER — LEVETIRACETAM 1000 MG/1
1500 TABLET ORAL 2 TIMES DAILY
Qty: 60 TABLET | Refills: 0 | Status: SHIPPED
Start: 2023-12-12 | End: 2024-03-11

## 2023-12-12 ASSESSMENT — FIBROSIS 4 INDEX: FIB4 SCORE: 0.85

## 2023-12-12 NOTE — PROGRESS NOTES
[facility]  Follow up visit    Patient name: Celestine Calderón  YOB: 1990  MRN: [id number]  Referring provider: EDILSON Dewitt  [address] [place]  [place] [id number]   Date of visit: 12/12/2023    Background (details from initial visit):    Celestine Calderón is a 33 y.o. woman who is being seen in follow up for seizures.     She had initially presented in September 2022 with multiple urgent care visits for flu-like symptoms and headache. She was discharged multiple times until her  one morning several days later heard odd noises coming from their bedroom, went to the bathroom and witnessed her having an apparent generalized tonic clonic seizure.     She had a post-ictal period and does not recall EMS arriving or being transported to [facility] via ambulance. Workup was notable for CSF studies c/w a viral versus aseptic meningitis. Virus was not identified ultimately, and autoimmune antibody panel was negative. She improved with supportive care and was discharged on 9/9/22 after being admitted on 9/6/2022.     Keppra was started for seizure treatment and she had a routine EEG during the inpatient stay which showed intermittent runs of rhythmic slowing involving the temporal region. MRI Brain showed abnormal signal in the left temporal lobe.     She had another seizure on the way home from the hospital. After discharge, she had a period of time being poorly responsive.    She has been following on an outpatient basis with Dr. Bland and is seeking a second opinion for more detailed explanation of the findings and rationale of treatment.     Essentially, she was altered after the hospitalization with poor balance, barely able to sit up in a chair at the time of follow up visits in October 2022. She had word finding difficulty that has improved gradually. Keppra dose was increased and Lyrica was added for seizure control and for pain, and her mental status improved  "significantly.     Medications reviewed in detail: Lyrica was started at 50 mg BID, increased to 75 mg BID. This was primarily for pain/headaches rather than seizures. The pain has improved.  After the hospitalization the Keppra was increased from 1000 mg BID to 1000 mg TID.     EEG was done last Friday, results not available for review.     She has occasional feelings of being in a fog and \"out of body\". She has not had any clinical seizures on current regimen.       Mood: stable, talks to friend who is therapy     Side effects: none identified, though irritability may be related to Keppra    Barriers to taking medication appropriately: no    Driving: yes    Vitamin D: not taking    Women's issues in epilepsy: planning future pregnancy. IUD removed late July 2022. At the beginning of August she had initially thought she was pregnant prior to menses returning. The couple was planning pregnancy prior to the viral encephalitis.       Interval history:  She has no had side effects from Keppra, occasionally becomes frustrated with her 3 year old for example, but feeling irritable is less frequent than it used to be.    She has had unexplained weight loss over the last 2 months. She has lost about 10 pounds since October. She has not tried to lose weight, has maintained similar diet.     She weaned off of Lyrica gradually and took last dose on October 30th.         Past Medical History:   Past Medical History:   Diagnosis Date    Epilepsy (HCC)     Viral encephalitis          Current Medications:   Current Outpatient Medications:     levetiracetam (KEPPRA) 1000 MG tablet, Take 1 Tablet by mouth 2 times a day. (Patient taking differently: Take 1,000 mg by mouth 3 times a day.), Disp: 60 Tablet, Rfl: 0    Allergies:   Allergies   Allergen Reactions    Penicillins Hives     Family member states reaction occurred in childhood.    Penicillin G          Physical Exam:   Ambulatory Vitals  Vitals:    12/12/23 1523   BP: 122/64 "   Pulse: 95   Temp: 36.3 °C (97.3 °F)   SpO2: 100%         Constitutional: Well-developed, well-nourished, good hygiene. Appears stated age.  Respiratory: normal respiratory effort  Skin: Warm, dry, intact. No rashes observed.  Neurologic:   Mental Status: Awake, alert, oriented x 3.   Speech: Fluent with normal prosody.   Memory: Able to recall recent and remote events accurately.    Concentration: Attentive. Able to focus on history and follow multi-step commands.   Fund of Knowledge: Appropriate.   Cranial Nerves:    CN II: PERRL     CN III, IV, VI: EOMI without nystagmus    CN V: Facial sensation intact and symmetric in all 3 trigeminal distributions    CN VII: No facial asymmetry    CN VIII: Hearing intact to voice    CN IX and X: Palate elevates symmetrically, gag reflex not tested    CN XI: Symmetric shoulder shrug     CN XII: Tongue midline   Motor: 5/5 in upper and lower extremities bilaterally   Sensory: Intact light touch, vibration and temperature diffusely    Coordination: No evidence of past-pointing on finger to nose testing, no dysdiadochokinesia.   Gait: ambulates steadily without assistive device   Movements: No resting tremors or abnormal movements observed.   Musculoskeletal:    Strength: as above   Tone: Normal bulk and tone   Joints: No swelling    Studies:      Labs reviewed: CSF autoimmune encephalopathy antibodies negative    Result Name               Result    Unit    Reference Value   ------------------------------------------------------------   AMPA-R Ab CBA, CSF        Negative          Negative    [1]   ------------------------------------------------------------   Amphiphysin Ab, CSF       Negative          Negative    [1]   ------------------------------------------------------------   AGNA-1, CSF               Negative          Negative    [1]   ------------------------------------------------------------   JESSICA-1, CSF               Negative          Negative    [1]    ------------------------------------------------------------   JESSICA-2, CSF               Negative          Negative    [1]   ------------------------------------------------------------   JESSICA-3, CSF               Negative          Negative    [1]   ------------------------------------------------------------   CASPR2-IgG CBA, CSF       Negative          Negative    [1]   ------------------------------------------------------------   CRMP-5-IgG, CSF           Negative          Negative    [1]   ------------------------------------------------------------   DPPX Ab IFA, CSF          Negative          Negative    [1]   ------------------------------------------------------------   OSEI-B-R Ab CBA, CSF      Negative          Negative    [1]   ------------------------------------------------------------   GAD65 Ab Assay, CSF       0.00      nmol/L  <=0.02      [1]   ------------------------------------------------------------   GFAP IFA, CSF             Negative          Negative    [1]   ------------------------------------------------------------   IgLON5 IFA, CSF           Negative          Negative    [1]   ------------------------------------------------------------   LGI1-IgG CBA, CSF         Negative          Negative    [1]   ------------------------------------------------------------   mGluR1 Ab IFA, CSF        Negative          Negative    [1]   ------------------------------------------------------------   Neurochondrin IFA, CSF    Negative          Negative    [1]   ------------------------------------------------------------   NIF IFA, CSF              Negative          Negative    [1]   ------------------------------------------------------------   NMDA-R Ab CBA, CSF        Negative          Negative    [1]   ------------------------------------------------------------   PCA-Tr, CSF               Negative          Negative    [1]   ------------------------------------------------------------   PCA-1,  CSF                Negative          Negative    [1]   ------------------------------------------------------------   PCA-2, CSF                Negative          Negative    [1]   ------------------------------------------------------------   Septin-7 IFA, CSF         Negative          Negative    [1]   ------------------------------------------------------------  CSF studies   Ref Range & Units 6 mo ago  (3/20/23) 1 yr ago  (9/6/22) 1 yr ago  (9/6/22)   Number Of Tubes  4  4  4    Volume mL 8.0  6.0  6.0    Color-Body Fluid  Colorless  Colorless  Colorless    Character-Body Fluid  Clear  Clear  Clear    Supernatant Appearance  Colorless  Colorless  Colorless    Total RBC Count cells/uL 1  2  3    Crenated RBC % 0  0  0    Total WBC Count 0 - 10 cells/uL 4  343 High   272 High        Imaging:       MRI Brain wwo contrast 8/9/2023  IMPRESSION:     Stable abnormal diffuse infiltrating T2 hyperintensity noted involving left temporal lobe without contrast enhancement. The differential diagnosis includes gliosis secondary to the encephalitis and low-grade glioma.    MRI Brain wwo contrast 2/16/23          EEG Results:     Ambulatory EEG 11/9/23 normal    Routine EEG 9/12/22  INTERPRETATION:   Abnormal video EEG recording in the awake, drowsy, and sleep state(s):  - Mild to moderate background slowing suggestive of diffuse/multifocal cerebral dysfunction and consistent with a nonspecific encephalopathy.  - Intermittent runs of generalized 1.5 to 2.5 Hz rhythmic or quasirhythmic slowing was noted, often asymmetrical and dyssynchronous.  Occasionally generalized slowing was followed by rhythmic left temporal maximal l 1 to 2 Hz rhythmic activity.  These findings may reflect a mild encephalopathy as noted above.  However the dyssynchronous generalized slowing and at times focal nature of the slowing over the left temporal lobe may suggest underlying epileptogenic potential with an increased risk for seizures.  However, no  seizures were seen.  Clinical and radiographic correlation recommended.  -There is ongoing concern for seizure activity consider longer-term EEG monitoring .      Assessment/Plan:   Celestine Calderón is a 33 y.o. 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 She had been followed at [facility] transferred care here in 10/2023.      She has been free of clinical seizures on Keppra 1000 mg 3 times daily.  A repeat MRI from August 2023 shows ongoing T2 hyperintensity involving the left temporal lobe as compared to the initial MRI from February 2023.  In the differential from radiology, low-grade glioma is mentioned as a possibility.  I have a lower suspicion for this given she had a history of viral encephalitis with EEG and imaging concordant at the time of the hospitalization.    Recent ambulatory EEG was reassuringly normal, suggesting seizures are under adequate control with Keppra.  Patient is not experiencing side effects with the medication, should continue without changes at this time.  Since the initial visit, she also was able to wean off Lyrica and has not had any issues since stopping the medication.    Since she is seizure-free for over 3 months, no contraindication for driving.    Recommended folate and vitamin D supplementation.    Patient was advised to contact the clinic if there are any concerns for experiencing breakthrough seizures, or side effects from medication.    Follow-up in approximately 3 months.    Brittney Sweet M.D.   Diplomate, Neurology with Special Qualification in Epilepsy, American Board of Psychiatry and Neurology   of Clinical Neurology, [facility] of Medicine  Level III Epilepsy Center, Department of Neurology at [facility]   12/12/2023        During today's encounter we discussed available treatment options and their individual side effect  profiles. Total encounter time caring for patient today 35 minutes.

## 2023-12-13 NOTE — PATIENT INSTRUCTIONS
Please let our office know if you have any changes in your seizure frequency and/characteristics. Otherwise, please keep the diary of your events and bring it with you at the time of your next follow up visit with our office.     Please take vitamin D3 [id number] internation units daily.     Please take folic acid 1 mg daily. This is an over-the-counter supplement that is recommended to prevent certain developmental problems in your baby, in case you become pregnant in the future.    Please let our office know as soon as you become pregnant or plan to become pregnant.    If you are caring for a baby/young child, please make sure to be sitting on a soft surface while holding your baby/young child, so in case you have a seizure, your baby/young child is not injured due to fall.     Please let us know if you observe that your baby is excessively sleepy/has other changes and the pediatrician feels that there are no other explanations except possible adverse effects of antiseizure medication(s) your are taking while nursing your baby.     Please note that the following might precipitate seizures: missed doses of antiseizure medications, being sick with fever, stress, fatigue, sleep deprivation, not eating regularly, not drinking enough water, drinking too much alcohol, stopping alcohol suddenly if you are currently using it on a regular/daily basis, and/or using recreational drugs, among others.    Please note that the following might lead to an injury, potentially a life-threatening injury, in case you have a seizure and/or lose awareness while:   - being in a large body of water by yourself, such as bath, pool, lake, ocean, among others (risk of drowning)   - being on unprotected heights (risk of fall)   - being around and/or operating heavy machinery (risk of injury)   - being around open fire/hot surfaces (risk of burns)   - any other activities/circumstances, in which if you lose awareness, you might injure  yourself and/or others.    Please call for help (crisis line and/or 911) in case you have thoughts of harming yourself and/or others.    Please abstain from driving until further notice.    ------------------------------------------------------------------------------------------  Instructions for your family/caregivers:  Please call 911 if the patient has a seizure longer than 2-3 minutes, if seizures are back to back without her recovering to her baseline, or she does not start recovering within 5-10 minutes after the seizure stops. During the seizure - please turn her on her side, please make sure her head is protected (for example, you should put a pillow under her head, if one is available), and please do not put anything in her mouth.   -------------------------------------------------------------------------------------------    It is important that your seizures are well controlled and you have none or have them rarely. In addition to avoiding injury related to breakthrough seizures, frequent seizures increase risk of SUDEP (sudden unexpected death in epilepsy), where a person goes into a seizure and then never wakes up - this is a rare complication of seizure disorder; one of the best ways to prevent it is to control your seizures well.     Due to the high volume of patients we are trying to help, your physician will not be able to respond by phone or in [employer] to your routine concerns between appointments.  This does not reflect a lack of interest or concern for you or your diagnosis.  Please bring these questions and concerns to your appointment where your physician can answer.  Please relay more pressing concerns to our office, either via [employer], or by phone; if not able to reach us please visit nearby Urgent Care Center or Emergency Department.  If any emergent medical needs, please seek emergent medical help and/or call 911.    Please note that we are not able to fill out paperwork that might be  related to your work, utility company, disability, and/or driving, among others, in between the visits.  Please schedule a dedicated appointment to address your paperwork, so we can do that in a timely manner.  This is not due to lack of concern or interest for your disease-related work/administrative problems, but to make sure that we provide the best possible care and to fill out your paperwork in a correct and timely manner.    Thank you for entrusting your neurological care to [facility] and we look forward to continuing to serve you.       According to one study that looked at a child's risk of developing epilepsy if a parent has epilepsy, these are the estimated percentages:     Generalized epilepsy Focal epilepsy   Mother 8% 4.5%   Father 7% 1%     Lynette AL, Sergio C, Geoff GOODWIN et al. Familial risk of epilepsy: a population-based study. Brain. 2014 Mar;137(Pt3): 795-805.

## 2023-12-13 NOTE — TELEPHONE ENCOUNTER
Received Refill PA request via [employer]  for Levetiracetam 1000mg tab. (Quantity:60 tab, Day Supply:30)     Insurance: [employer] [facility]  Member ID:  [id number]  BIN: [id number]  PCN: [id number]  Group: [id number]     Ran Test claim via [employer] [facility] for a $0.00 copay. Will outreach to patient to offer specialty pharmacy services and or release to preferred pharmacy

## 2024-01-08 ENCOUNTER — APPOINTMENT (RX ONLY)
Dept: URBAN - METROPOLITAN AREA CLINIC 6 | Facility: CLINIC | Age: 34
Setting detail: DERMATOLOGY
End: 2024-01-08

## 2024-01-08 DIAGNOSIS — L0292 CARBUNCLE AND FURUNCLE OF UNSPECIFIED SITE: ICD-10-CM

## 2024-01-08 DIAGNOSIS — L0293 CARBUNCLE AND FURUNCLE OF UNSPECIFIED SITE: ICD-10-CM

## 2024-01-08 PROBLEM — L08.9 LOCAL INFECTION OF THE SKIN AND SUBCUTANEOUS TISSUE, UNSPECIFIED: Status: ACTIVE | Noted: 2024-01-08

## 2024-01-08 PROCEDURE — ? DEFER

## 2024-01-08 PROCEDURE — 99212 OFFICE O/P EST SF 10 MIN: CPT

## 2024-01-08 PROCEDURE — ? COUNSELING

## 2024-01-08 ASSESSMENT — LOCATION ZONE DERM: LOCATION ZONE: LEG

## 2024-01-08 ASSESSMENT — LOCATION DETAILED DESCRIPTION DERM: LOCATION DETAILED: LEFT ANTERIOR PROXIMAL THIGH

## 2024-01-08 ASSESSMENT — LOCATION SIMPLE DESCRIPTION DERM: LOCATION SIMPLE: LEFT THIGH

## 2024-01-08 NOTE — PROCEDURE: DEFER
Detail Level: Detailed
Size Of Lesion In Cm (Optional): 0.4
X Size Of Lesion In Cm (Optional): 0
Introduction Text (Please End With A Colon): The following procedure was deferred:

## 2024-02-05 ENCOUNTER — APPOINTMENT (RX ONLY)
Dept: URBAN - METROPOLITAN AREA CLINIC 6 | Facility: CLINIC | Age: 34
Setting detail: DERMATOLOGY
End: 2024-02-05

## 2024-02-05 DIAGNOSIS — G40.909 SEIZURE DISORDER (HCC): ICD-10-CM

## 2024-02-05 DIAGNOSIS — L72.0 EPIDERMAL CYST: ICD-10-CM

## 2024-02-05 PROCEDURE — ? EXCISION

## 2024-02-05 ASSESSMENT — LOCATION ZONE DERM: LOCATION ZONE: LEG

## 2024-02-05 ASSESSMENT — LOCATION DETAILED DESCRIPTION DERM: LOCATION DETAILED: LEFT ANTERIOR PROXIMAL THIGH

## 2024-02-05 ASSESSMENT — LOCATION SIMPLE DESCRIPTION DERM: LOCATION SIMPLE: LEFT THIGH

## 2024-02-05 NOTE — PROCEDURE: EXCISION
Medical Necessity Information: It is in your best interest to select a reason for this procedure from the list below. All of these items fulfill various CMS LCD requirements except lesion extends to a margin.
Include Z78.9 (Other Specified Conditions Influencing Health Status) As An Associated Diagnosis?: No
Medical Necessity Clause: This procedure was medically necessary because the lesion that was treated was:
Lab: [id number]
Lab Facility: 
Surgeon (Optional): Dr. Olman Eugene
Size Of Lesion In Cm: 0.4
X Size Of Lesion In Cm (Optional): 0
Size Of Margin In Cm: 0.1
Anesthesia Volume In Cc: 4.5
Excision Method: Fusiform
Saucerization Depth: dermis and superficial adipose tissue
Repair Type: Intermediate
Suturegard Retention Suture: 2-0 Nylon
Retention Suture Bite Size: 3 mm
Length To Time In Minutes Device Was In Place: 10
Number Of Hemigard Strips Per Side: 1
Undermining Type: Entire Wound
Debridement Text: The wound edges were debrided prior to proceeding with the closure to facilitate wound healing.
Helical Rim Text: The closure involved the helical rim.
Vermilion Border Text: The closure involved the vermilion border.
Nostril Rim Text: The closure involved the nostril rim.
Retention Suture Text: Retention sutures were placed to support the closure and prevent dehiscence.
Suture Removal: 14 days
Epidermal Closure Graft Donor Site (Optional): simple interrupted
Graft Donor Site Bandage (Optional-Leave Blank If You Don't Want In Note): Steri-strips and a pressure bandage were applied to the donor site.
Detail Level: Detailed
Excision Depth: deep subcutaneous fat
Scalpel Size: 15 blade
Anesthesia Type: 1% lidocaine with epinephrine
Additional Anesthesia Volume In Cc: 6
Hemostasis: Electrocautery and suture ligation
Estimated Blood Loss (Cc): minimal
Deep Sutures: 4-0 Caprosyn
Epidermal Sutures: 4-0 Surgipro
Wound Care: Petrolatum
Dressing: pressure dressing with telfa
Suturegard Intro: Intraoperative tissue expansion was performed, utilizing the SUTUREGARD device, in order to reduce wound tension.
Suturegard Body: The suture ends were repeatedly re-tightened and re-clamped to achieve the desired tissue expansion.
Hemigard Intro: Due to skin fragility and wound tension, it was decided to use HEMIGARD adhesive retention suture devices to permit a linear closure. The skin was cleaned and dried for a 6cm distance away from the wound. Excessive hair, if present, was removed to allow for adhesion.
Hemigard Postcare Instructions: The HEMIGARD strips are to remain completely dry for at least 5-7 days.
Complex Repair Preamble Text (Leave Blank If You Do Not Want): Extensive wide undermining was performed.
Intermediate Repair Preamble Text (Leave Blank If You Do Not Want): Undermining was performed with blunt dissection.
Fusiform Excision Additional Text (Leave Blank If You Do Not Want): The margin was drawn around the clinically apparent lesion.  A fusiform shape was then drawn on the skin incorporating the lesion and margins.  Incisions were then made along these lines to the appropriate tissue plane and the lesion was extirpated.
Eliptical Excision Additional Text (Leave Blank If You Do Not Want): The margin was drawn around the clinically apparent lesion.  An elliptical shape was then drawn on the skin incorporating the lesion and margins.  Incisions were then made along these lines to the appropriate tissue plane and the lesion was extirpated.
Saucerization Excision Additional Text (Leave Blank If You Do Not Want): The margin was drawn around the clinically apparent lesion.  Incisions were then made along these lines, in a tangential fashion, to the appropriate tissue plane and the lesion was extirpated.
Slit Excision Additional Text (Leave Blank If You Do Not Want): A linear line was drawn on the skin overlying the lesion. An incision was made slowly until the lesion was visualized.  Once visualized, the lesion was removed with blunt dissection.
Excisional Biopsy Additional Text (Leave Blank If You Do Not Want): The margin was drawn around the clinically apparent lesion. An elliptical shape was then drawn on the skin incorporating the lesion and margins.  Incisions were then made along these lines to the appropriate tissue plane and the lesion was extirpated.
Perilesional Excision Additional Text (Leave Blank If You Do Not Want): The margin was drawn around the clinically apparent lesion. Incisions were then made along these lines to the appropriate tissue plane and the lesion was extirpated.
Repair Performed By Another Provider Text (Leave Blank If You Do Not Want): After the tissue was excised the defect was repaired by another provider.
No Repair - Repaired With Adjacent Surgical Defect Text (Leave Blank If You Do Not Want): After the excision the defect was repaired concurrently with another surgical defect which was in close approximation.
Adjacent Tissue Transfer Text: The defect edges were debeveled with a #15 scalpel blade. Given the location of the defect and the proximity to free margins an adjacent tissue transfer was deemed most appropriate. Using a sterile surgical marker, an appropriate flap was drawn incorporating the defect and placing the expected incisions within the relaxed skin tension lines where possible. The area thus outlined was incised deep to adipose tissue with a #15 scalpel blade. The skin margins were undermined to an appropriate distance in all directions utilizing iris scissors and carried over to close the primary defect.
Advancement Flap (Single) Text: The defect edges were debeveled with a #15 scalpel blade.  Given the location of the defect and the proximity to free margins a single advancement flap was deemed most appropriate.  Using a sterile surgical marker, an appropriate advancement flap was drawn incorporating the defect and placing the expected incisions within the relaxed skin tension lines where possible.    The area thus outlined was incised deep to adipose tissue with a #15 scalpel blade.  The skin margins were undermined to an appropriate distance in all directions utilizing iris scissors.
Advancement Flap (Double) Text: The defect edges were debeveled with a #15 scalpel blade.  Given the location of the defect and the proximity to free margins a double advancement flap was deemed most appropriate.  Using a sterile surgical marker, the appropriate advancement flaps were drawn incorporating the defect and placing the expected incisions within the relaxed skin tension lines where possible.    The area thus outlined was incised deep to adipose tissue with a #15 scalpel blade.  The skin margins were undermined to an appropriate distance in all directions utilizing iris scissors.
Burow's Advancement Flap Text: The defect edges were debeveled with a #15 scalpel blade.  Given the location of the defect and the proximity to free margins a Burow's advancement flap was deemed most appropriate.  Using a sterile surgical marker, the appropriate advancement flap was drawn incorporating the defect and placing the expected incisions within the relaxed skin tension lines where possible.    The area thus outlined was incised deep to adipose tissue with a #15 scalpel blade.  The skin margins were undermined to an appropriate distance in all directions utilizing iris scissors.
Chonodrocutaneous Helical Advancement Flap Text: The defect edges were debeveled with a #15 scalpel blade.  Given the location of the defect and the proximity to free margins a chondrocutaneous helical advancement flap was deemed most appropriate.  Using a sterile surgical marker, the appropriate advancement flap was drawn incorporating the defect and placing the expected incisions within the relaxed skin tension lines where possible.    The area thus outlined was incised deep to adipose tissue with a #15 scalpel blade.  The skin margins were undermined to an appropriate distance in all directions utilizing iris scissors.
Crescentic Advancement Flap Text: The defect edges were debeveled with a #15 scalpel blade.  Given the location of the defect and the proximity to free margins a crescentic advancement flap was deemed most appropriate.  Using a sterile surgical marker, the appropriate advancement flap was drawn incorporating the defect and placing the expected incisions within the relaxed skin tension lines where possible.    The area thus outlined was incised deep to adipose tissue with a #15 scalpel blade.  The skin margins were undermined to an appropriate distance in all directions utilizing iris scissors.
A-T Advancement Flap Text: The defect edges were debeveled with a #15 scalpel blade.  Given the location of the defect, shape of the defect and the proximity to free margins an A-T advancement flap was deemed most appropriate.  Using a sterile surgical marker, an appropriate advancement flap was drawn incorporating the defect and placing the expected incisions within the relaxed skin tension lines where possible.    The area thus outlined was incised deep to adipose tissue with a #15 scalpel blade.  The skin margins were undermined to an appropriate distance in all directions utilizing iris scissors.
O-T Advancement Flap Text: The defect edges were debeveled with a #15 scalpel blade.  Given the location of the defect, shape of the defect and the proximity to free margins an O-T advancement flap was deemed most appropriate.  Using a sterile surgical marker, an appropriate advancement flap was drawn incorporating the defect and placing the expected incisions within the relaxed skin tension lines where possible.    The area thus outlined was incised deep to adipose tissue with a #15 scalpel blade.  The skin margins were undermined to an appropriate distance in all directions utilizing iris scissors.
O-L Flap Text: The defect edges were debeveled with a #15 scalpel blade.  Given the location of the defect, shape of the defect and the proximity to free margins an O-L flap was deemed most appropriate.  Using a sterile surgical marker, an appropriate advancement flap was drawn incorporating the defect and placing the expected incisions within the relaxed skin tension lines where possible.    The area thus outlined was incised deep to adipose tissue with a #15 scalpel blade.  The skin margins were undermined to an appropriate distance in all directions utilizing iris scissors.
O-Z Flap Text: The defect edges were debeveled with a #15 scalpel blade.  Given the location of the defect, shape of the defect and the proximity to free margins an O-Z flap was deemed most appropriate.  Using a sterile surgical marker, an appropriate transposition flap was drawn incorporating the defect and placing the expected incisions within the relaxed skin tension lines where possible. The area thus outlined was incised deep to adipose tissue with a #15 scalpel blade.  The skin margins were undermined to an appropriate distance in all directions utilizing iris scissors.
Double O-Z Flap Text: The defect edges were debeveled with a #15 scalpel blade.  Given the location of the defect, shape of the defect and the proximity to free margins a Double O-Z flap was deemed most appropriate.  Using a sterile surgical marker, an appropriate transposition flap was drawn incorporating the defect and placing the expected incisions within the relaxed skin tension lines where possible. The area thus outlined was incised deep to adipose tissue with a #15 scalpel blade.  The skin margins were undermined to an appropriate distance in all directions utilizing iris scissors.
V-Y Flap Text: The defect edges were debeveled with a #15 scalpel blade.  Given the location of the defect, shape of the defect and the proximity to free margins a V-Y flap was deemed most appropriate.  Using a sterile surgical marker, an appropriate advancement flap was drawn incorporating the defect and placing the expected incisions within the relaxed skin tension lines where possible.    The area thus outlined was incised deep to adipose tissue with a #15 scalpel blade.  The skin margins were undermined to an appropriate distance in all directions utilizing iris scissors.
Mercedes Flap Text: The defect edges were debeveled with a #15 scalpel blade.  Given the location of the defect, shape of the defect and the proximity to free margins a Mercedes flap was deemed most appropriate.  Using a sterile surgical marker, an appropriate advancement flap was drawn incorporating the defect and placing the expected incisions within the relaxed skin tension lines where possible. The area thus outlined was incised deep to adipose tissue with a #15 scalpel blade.  The skin margins were undermined to an appropriate distance in all directions utilizing iris scissors.
Modified Advancement Flap Text: The defect edges were debeveled with a #15 scalpel blade.  Given the location of the defect, shape of the defect and the proximity to free margins a modified advancement flap was deemed most appropriate.  Using a sterile surgical marker, an appropriate advancement flap was drawn incorporating the defect and placing the expected incisions within the relaxed skin tension lines where possible.    The area thus outlined was incised deep to adipose tissue with a #15 scalpel blade.  The skin margins were undermined to an appropriate distance in all directions utilizing iris scissors.
Mucosal Advancement Flap Text: Given the location of the defect, shape of the defect and the proximity to free margins a mucosal advancement flap was deemed most appropriate. Incisions were made with a 15 blade scalpel in the appropriate fashion along the cutaneous vermillion border and the mucosal lip. The remaining actinically damaged mucosal tissue was excised.  The mucosal advancement flap was then elevated to the gingival sulcus with care taken to preserve the neurovascular structures and advanced into the primary defect. Care was taken to ensure that precise realignment of the vermilion border was achieved.
Hatchet Flap Text: The defect edges were debeveled with a #15 scalpel blade.  Given the location of the defect, shape of the defect and the proximity to free margins a hatchet flap was deemed most appropriate.  Using a sterile surgical marker, an appropriate hatchet flap was drawn incorporating the defect and placing the expected incisions within the relaxed skin tension lines where possible.    The area thus outlined was incised deep to adipose tissue with a #15 scalpel blade.  The skin margins were undermined to an appropriate distance in all directions utilizing iris scissors.
Rotation Flap Text: The defect edges were debeveled with a #15 scalpel blade.  Given the location of the defect, shape of the defect and the proximity to free margins a rotation flap was deemed most appropriate.  Using a sterile surgical marker, an appropriate rotation flap was drawn incorporating the defect and placing the expected incisions within the relaxed skin tension lines where possible.    The area thus outlined was incised deep to adipose tissue with a #15 scalpel blade.  The skin margins were undermined to an appropriate distance in all directions utilizing iris scissors.
Bilateral Rotation Flap Text: The defect edges were debeveled with a #15 scalpel blade. Given the location of the defect, shape of the defect and the proximity to free margins a bilateral rotation flap was deemed most appropriate. Using a sterile surgical marker, an appropriate rotation flap was drawn incorporating the defect and placing the expected incisions within the relaxed skin tension lines where possible. The area thus outlined was incised deep to adipose tissue with a #15 scalpel blade. The skin margins were undermined to an appropriate distance in all directions utilizing iris scissors. Following this, the designed flap was carried over into the primary defect and sutured into place.
Spiral Flap Text: The defect edges were debeveled with a #15 scalpel blade.  Given the location of the defect, shape of the defect and the proximity to free margins a spiral flap was deemed most appropriate.  Using a sterile surgical marker, an appropriate rotation flap was drawn incorporating the defect and placing the expected incisions within the relaxed skin tension lines where possible. The area thus outlined was incised deep to adipose tissue with a #15 scalpel blade.  The skin margins were undermined to an appropriate distance in all directions utilizing iris scissors.
Staged Advancement Flap Text: The defect edges were debeveled with a #15 scalpel blade.  Given the location of the defect, shape of the defect and the proximity to free margins a staged advancement flap was deemed most appropriate.  Using a sterile surgical marker, an appropriate advancement flap was drawn incorporating the defect and placing the expected incisions within the relaxed skin tension lines where possible. The area thus outlined was incised deep to adipose tissue with a #15 scalpel blade.  The skin margins were undermined to an appropriate distance in all directions utilizing iris scissors.
Star Wedge Flap Text: The defect edges were debeveled with a #15 scalpel blade.  Given the location of the defect, shape of the defect and the proximity to free margins a star wedge flap was deemed most appropriate.  Using a sterile surgical marker, an appropriate rotation flap was drawn incorporating the defect and placing the expected incisions within the relaxed skin tension lines where possible. The area thus outlined was incised deep to adipose tissue with a #15 scalpel blade.  The skin margins were undermined to an appropriate distance in all directions utilizing iris scissors.
Transposition Flap Text: The defect edges were debeveled with a #15 scalpel blade.  Given the location of the defect and the proximity to free margins a transposition flap was deemed most appropriate.  Using a sterile surgical marker, an appropriate transposition flap was drawn incorporating the defect.    The area thus outlined was incised deep to adipose tissue with a #15 scalpel blade.  The skin margins were undermined to an appropriate distance in all directions utilizing iris scissors.
Muscle Hinge Flap Text: The defect edges were debeveled with a #15 scalpel blade.  Given the size, depth and location of the defect and the proximity to free margins a muscle hinge flap was deemed most appropriate.  Using a sterile surgical marker, an appropriate hinge flap was drawn incorporating the defect. The area thus outlined was incised with a #15 scalpel blade.  The skin margins were undermined to an appropriate distance in all directions utilizing iris scissors.
Mustarde Flap Text: The defect edges were debeveled with a #15 scalpel blade.  Given the size, depth and location of the defect and the proximity to free margins a Mustarde flap was deemed most appropriate.  Using a sterile surgical marker, an appropriate flap was drawn incorporating the defect. The area thus outlined was incised with a #15 scalpel blade.  The skin margins were undermined to an appropriate distance in all directions utilizing iris scissors.
Nasal Turnover Hinge Flap Text: The defect edges were debeveled with a #15 scalpel blade.  Given the size, depth, location of the defect and the defect being full thickness a nasal turnover hinge flap was deemed most appropriate.  Using a sterile surgical marker, an appropriate hinge flap was drawn incorporating the defect. The area thus outlined was incised with a #15 scalpel blade. The flap was designed to recreate the nasal mucosal lining and the alar rim. The skin margins were undermined to an appropriate distance in all directions utilizing iris scissors.
Nasalis-Muscle-Based Myocutaneous Island Pedicle Flap Text: Using a #15 blade, an incision was made around the donor flap to the level of the nasalis muscle. Wide lateral undermining was then performed in both the subcutaneous plane above the nasalis muscle, and in a submuscular plane just above periosteum. This allowed the formation of a free nasalis muscle axial pedicle (based on the angular artery) which was still attached to the actual cutaneous flap, increasing its mobility and vascular viability. Hemostasis was obtained with pinpoint electrocoagulation. The flap was mobilized into position and the pivotal anchor points positioned and stabilized with buried interrupted sutures. Subcutaneous and dermal tissues were closed in a multilayered fashion with sutures. Tissue redundancies were excised, and the epidermal edges were apposed without significant tension and sutured with sutures.
Orbicularis Oris Muscle Flap Text: The defect edges were debeveled with a #15 scalpel blade.  Given that the defect affected the competency of the oral sphincter an orbicularis oris muscle flap was deemed most appropriate to restore this competency and normal muscle function.  Using a sterile surgical marker, an appropriate flap was drawn incorporating the defect. The area thus outlined was incised with a #15 scalpel blade.
Melolabial Transposition Flap Text: The defect edges were debeveled with a #15 scalpel blade.  Given the location of the defect and the proximity to free margins a melolabial flap was deemed most appropriate.  Using a sterile surgical marker, an appropriate melolabial transposition flap was drawn incorporating the defect.    The area thus outlined was incised deep to adipose tissue with a #15 scalpel blade.  The skin margins were undermined to an appropriate distance in all directions utilizing iris scissors.
Rhombic Flap Text: The defect edges were debeveled with a #15 scalpel blade.  Given the location of the defect and the proximity to free margins a rhombic flap was deemed most appropriate.  Using a sterile surgical marker, an appropriate rhombic flap was drawn incorporating the defect.    The area thus outlined was incised deep to adipose tissue with a #15 scalpel blade.  The skin margins were undermined to an appropriate distance in all directions utilizing iris scissors.
Rhomboid Transposition Flap Text: The defect edges were debeveled with a #15 scalpel blade.  Given the location of the defect and the proximity to free margins a rhomboid transposition flap was deemed most appropriate.  Using a sterile surgical marker, an appropriate rhomboid flap was drawn incorporating the defect.    The area thus outlined was incised deep to adipose tissue with a #15 scalpel blade.  The skin margins were undermined to an appropriate distance in all directions utilizing iris scissors.
Bi-Rhombic Flap Text: The defect edges were debeveled with a #15 scalpel blade.  Given the location of the defect and the proximity to free margins a bi-rhombic flap was deemed most appropriate.  Using a sterile surgical marker, an appropriate rhombic flap was drawn incorporating the defect. The area thus outlined was incised deep to adipose tissue with a #15 scalpel blade.  The skin margins were undermined to an appropriate distance in all directions utilizing iris scissors.
Helical Rim Advancement Flap Text: The defect edges were debeveled with a #15 blade scalpel.  Given the location of the defect and the proximity to free margins (helical rim) a double helical rim advancement flap was deemed most appropriate.  Using a sterile surgical marker, the appropriate advancement flaps were drawn incorporating the defect and placing the expected incisions between the helical rim and antihelix where possible.  The area thus outlined was incised through and through with a #15 scalpel blade.  With a skin hook and iris scissors, the flaps were gently and sharply undermined and freed up.
Bilateral Helical Rim Advancement Flap Text: The defect edges were debeveled with a #15 blade scalpel.  Given the location of the defect and the proximity to free margins (helical rim) a bilateral helical rim advancement flap was deemed most appropriate.  Using a sterile surgical marker, the appropriate advancement flaps were drawn incorporating the defect and placing the expected incisions between the helical rim and antihelix where possible.  The area thus outlined was incised through and through with a #15 scalpel blade.  With a skin hook and iris scissors, the flaps were gently and sharply undermined and freed up.
Ear Star Wedge Flap Text: The defect edges were debeveled with a #15 blade scalpel.  Given the location of the defect and the proximity to free margins (helical rim) an ear star wedge flap was deemed most appropriate.  Using a sterile surgical marker, the appropriate flap was drawn incorporating the defect and placing the expected incisions between the helical rim and antihelix where possible.  The area thus outlined was incised through and through with a #15 scalpel blade.
Banner Transposition Flap Text: The defect edges were debeveled with a #15 scalpel blade.  Given the location of the defect and the proximity to free margins a Banner transposition flap was deemed most appropriate.  Using a sterile surgical marker, an appropriate flap drawn around the defect. The area thus outlined was incised deep to adipose tissue with a #15 scalpel blade.  The skin margins were undermined to an appropriate distance in all directions utilizing iris scissors.
Bilobed Flap Text: The defect edges were debeveled with a #15 scalpel blade.  Given the location of the defect and the proximity to free margins a bilobe flap was deemed most appropriate.  Using a sterile surgical marker, an appropriate bilobe flap drawn around the defect.    The area thus outlined was incised deep to adipose tissue with a #15 scalpel blade.  The skin margins were undermined to an appropriate distance in all directions utilizing iris scissors.
Bilobed Transposition Flap Text: The defect edges were debeveled with a #15 scalpel blade.  Given the location of the defect and the proximity to free margins a bilobed transposition flap was deemed most appropriate.  Using a sterile surgical marker, an appropriate bilobe flap drawn around the defect.    The area thus outlined was incised deep to adipose tissue with a #15 scalpel blade.  The skin margins were undermined to an appropriate distance in all directions utilizing iris scissors.
Trilobed Flap Text: The defect edges were debeveled with a #15 scalpel blade.  Given the location of the defect and the proximity to free margins a trilobed flap was deemed most appropriate.  Using a sterile surgical marker, an appropriate trilobed flap drawn around the defect.    The area thus outlined was incised deep to adipose tissue with a #15 scalpel blade.  The skin margins were undermined to an appropriate distance in all directions utilizing iris scissors.
Dorsal Nasal Flap Text: The defect edges were debeveled with a #15 scalpel blade.  Given the location of the defect and the proximity to free margins a dorsal nasal flap was deemed most appropriate.  Using a sterile surgical marker, an appropriate dorsal nasal flap was drawn around the defect.    The area thus outlined was incised deep to adipose tissue with a #15 scalpel blade.  The skin margins were undermined to an appropriate distance in all directions utilizing iris scissors.
Island Pedicle Flap Text: The defect edges were debeveled with a #15 scalpel blade.  Given the location of the defect, shape of the defect and the proximity to free margins an island pedicle advancement flap was deemed most appropriate.  Using a sterile surgical marker, an appropriate advancement flap was drawn incorporating the defect, outlining the appropriate donor tissue and placing the expected incisions within the relaxed skin tension lines where possible.    The area thus outlined was incised deep to adipose tissue with a #15 scalpel blade.  The skin margins were undermined to an appropriate distance in all directions around the primary defect and laterally outward around the island pedicle utilizing iris scissors.  There was minimal undermining beneath the pedicle flap.
Island Pedicle Flap With Canthal Suspension Text: The defect edges were debeveled with a #15 scalpel blade.  Given the location of the defect, shape of the defect and the proximity to free margins an island pedicle advancement flap was deemed most appropriate.  Using a sterile surgical marker, an appropriate advancement flap was drawn incorporating the defect, outlining the appropriate donor tissue and placing the expected incisions within the relaxed skin tension lines where possible. The area thus outlined was incised deep to adipose tissue with a #15 scalpel blade.  The skin margins were undermined to an appropriate distance in all directions around the primary defect and laterally outward around the island pedicle utilizing iris scissors.  There was minimal undermining beneath the pedicle flap. A suspension suture was placed in the canthal tendon to prevent tension and prevent ectropion.
Alar Island Pedicle Flap Text: The defect edges were debeveled with a #15 scalpel blade.  Given the location of the defect, shape of the defect and the proximity to the alar rim an island pedicle advancement flap was deemed most appropriate.  Using a sterile surgical marker, an appropriate advancement flap was drawn incorporating the defect, outlining the appropriate donor tissue and placing the expected incisions within the nasal ala running parallel to the alar rim. The area thus outlined was incised with a #15 scalpel blade.  The skin margins were undermined minimally to an appropriate distance in all directions around the primary defect and laterally outward around the island pedicle utilizing iris scissors.  There was minimal undermining beneath the pedicle flap.
Double Island Pedicle Flap Text: The defect edges were debeveled with a #15 scalpel blade.  Given the location of the defect, shape of the defect and the proximity to free margins a double island pedicle advancement flap was deemed most appropriate.  Using a sterile surgical marker, an appropriate advancement flap was drawn incorporating the defect, outlining the appropriate donor tissue and placing the expected incisions within the relaxed skin tension lines where possible.    The area thus outlined was incised deep to adipose tissue with a #15 scalpel blade.  The skin margins were undermined to an appropriate distance in all directions around the primary defect and laterally outward around the island pedicle utilizing iris scissors.  There was minimal undermining beneath the pedicle flap.
Island Pedicle Flap-Requiring Vessel Identification Text: The defect edges were debeveled with a #15 scalpel blade.  Given the location of the defect, shape of the defect and the proximity to free margins an island pedicle advancement flap was deemed most appropriate.  Using a sterile surgical marker, an appropriate advancement flap was drawn, based on the axial vessel mentioned above, incorporating the defect, outlining the appropriate donor tissue and placing the expected incisions within the relaxed skin tension lines where possible.    The area thus outlined was incised deep to adipose tissue with a #15 scalpel blade.  The skin margins were undermined to an appropriate distance in all directions around the primary defect and laterally outward around the island pedicle utilizing iris scissors.  There was minimal undermining beneath the pedicle flap.
Keystone Flap Text: The defect edges were debeveled with a #15 scalpel blade.  Given the location of the defect, shape of the defect a keystone flap was deemed most appropriate.  Using a sterile surgical marker, an appropriate keystone flap was drawn incorporating the defect, outlining the appropriate donor tissue and placing the expected incisions within the relaxed skin tension lines where possible. The area thus outlined was incised deep to adipose tissue with a #15 scalpel blade.  The skin margins were undermined to an appropriate distance in all directions around the primary defect and laterally outward around the flap utilizing iris scissors.
O-T Plasty Text: The defect edges were debeveled with a #15 scalpel blade.  Given the location of the defect, shape of the defect and the proximity to free margins an O-T plasty was deemed most appropriate.  Using a sterile surgical marker, an appropriate O-T plasty was drawn incorporating the defect and placing the expected incisions within the relaxed skin tension lines where possible.    The area thus outlined was incised deep to adipose tissue with a #15 scalpel blade.  The skin margins were undermined to an appropriate distance in all directions utilizing iris scissors.
O-Z Plasty Text: The defect edges were debeveled with a #15 scalpel blade.  Given the location of the defect, shape of the defect and the proximity to free margins an O-Z plasty (double transposition flap) was deemed most appropriate.  Using a sterile surgical marker, the appropriate transposition flaps were drawn incorporating the defect and placing the expected incisions within the relaxed skin tension lines where possible.    The area thus outlined was incised deep to adipose tissue with a #15 scalpel blade.  The skin margins were undermined to an appropriate distance in all directions utilizing iris scissors.  Hemostasis was achieved with electrocautery.  The flaps were then transposed into place, one clockwise and the other counterclockwise, and anchored with interrupted buried subcutaneous sutures.
Double O-Z Plasty Text: The defect edges were debeveled with a #15 scalpel blade.  Given the location of the defect, shape of the defect and the proximity to free margins a Double O-Z plasty (double transposition flap) was deemed most appropriate.  Using a sterile surgical marker, the appropriate transposition flaps were drawn incorporating the defect and placing the expected incisions within the relaxed skin tension lines where possible. The area thus outlined was incised deep to adipose tissue with a #15 scalpel blade.  The skin margins were undermined to an appropriate distance in all directions utilizing iris scissors.  Hemostasis was achieved with electrocautery.  The flaps were then transposed into place, one clockwise and the other counterclockwise, and anchored with interrupted buried subcutaneous sutures.
V-Y Plasty Text: The defect edges were debeveled with a #15 scalpel blade.  Given the location of the defect, shape of the defect and the proximity to free margins an V-Y advancement flap was deemed most appropriate.  Using a sterile surgical marker, an appropriate advancement flap was drawn incorporating the defect and placing the expected incisions within the relaxed skin tension lines where possible.    The area thus outlined was incised deep to adipose tissue with a #15 scalpel blade.  The skin margins were undermined to an appropriate distance in all directions utilizing iris scissors.
H Plasty Text: Given the location of the defect, shape of the defect and the proximity to free margins a H-plasty was deemed most appropriate for repair.  Using a sterile surgical marker, the appropriate advancement arms of the H-plasty were drawn incorporating the defect and placing the expected incisions within the relaxed skin tension lines where possible. The area thus outlined was incised deep to adipose tissue with a #15 scalpel blade. The skin margins were undermined to an appropriate distance in all directions utilizing iris scissors.  The opposing advancement arms were then advanced into place in opposite direction and anchored with interrupted buried subcutaneous sutures.
W Plasty Text: The lesion was extirpated to the level of the fat with a #15 scalpel blade.  Given the location of the defect, shape of the defect and the proximity to free margins a W-plasty was deemed most appropriate for repair.  Using a sterile surgical marker, the appropriate transposition arms of the W-plasty were drawn incorporating the defect and placing the expected incisions within the relaxed skin tension lines where possible.    The area thus outlined was incised deep to adipose tissue with a #15 scalpel blade.  The skin margins were undermined to an appropriate distance in all directions utilizing iris scissors.  The opposing transposition arms were then transposed into place in opposite direction and anchored with interrupted buried subcutaneous sutures.
Z Plasty Text: The lesion was extirpated to the level of the fat with a #15 scalpel blade.  Given the location of the defect, shape of the defect and the proximity to free margins a Z-plasty was deemed most appropriate for repair.  Using a sterile surgical marker, the appropriate transposition arms of the Z-plasty were drawn incorporating the defect and placing the expected incisions within the relaxed skin tension lines where possible.    The area thus outlined was incised deep to adipose tissue with a #15 scalpel blade.  The skin margins were undermined to an appropriate distance in all directions utilizing iris scissors.  The opposing transposition arms were then transposed into place in opposite direction and anchored with interrupted buried subcutaneous sutures.
Double Z Plasty Text: The lesion was extirpated to the level of the fat with a #15 scalpel blade. Given the location of the defect, shape of the defect and the proximity to free margins a double Z-plasty was deemed most appropriate for repair. Using a sterile surgical marker, the appropriate transposition arms of the double Z-plasty were drawn incorporating the defect and placing the expected incisions within the relaxed skin tension lines where possible. The area thus outlined was incised deep to adipose tissue with a #15 scalpel blade. The skin margins were undermined to an appropriate distance in all directions utilizing iris scissors. The opposing transposition arms were then transposed and carried over into place in opposite direction and anchored with interrupted buried subcutaneous sutures.
Zygomaticofacial Flap Text: Given the location of the defect, shape of the defect and the proximity to free margins a zygomaticofacial flap was deemed most appropriate for repair.  Using a sterile surgical marker, the appropriate flap was drawn incorporating the defect and placing the expected incisions within the relaxed skin tension lines where possible. The area thus outlined was incised deep to adipose tissue with a #15 scalpel blade with preservation of a vascular pedicle.  The skin margins were undermined to an appropriate distance in all directions utilizing iris scissors.  The flap was then placed into the defect and anchored with interrupted buried subcutaneous sutures.
Cheek Interpolation Flap Text: A decision was made to reconstruct the defect utilizing an interpolation axial flap and a staged reconstruction.  A telfa template was made of the defect.  This telfa template was then used to outline the Cheek Interpolation flap.  The donor area for the pedicle flap was then injected with anesthesia.  The flap was excised through the skin and subcutaneous tissue down to the layer of the underlying musculature.  The interpolation flap was carefully excised within this deep plane to maintain its blood supply.  The edges of the donor site were undermined.   The donor site was closed in a primary fashion.  The pedicle was then rotated into position and sutured.  Once the tube was sutured into place, adequate blood supply was confirmed with blanching and refill.  The pedicle was then wrapped with xeroform gauze and dressed appropriately with a telfa and gauze bandage to ensure continued blood supply and protect the attached pedicle.
Cheek-To-Nose Interpolation Flap Text: A decision was made to reconstruct the defect utilizing an interpolation axial flap and a staged reconstruction.  A telfa template was made of the defect.  This telfa template was then used to outline the Cheek-To-Nose Interpolation flap.  The donor area for the pedicle flap was then injected with anesthesia.  The flap was excised through the skin and subcutaneous tissue down to the layer of the underlying musculature.  The interpolation flap was carefully excised within this deep plane to maintain its blood supply.  The edges of the donor site were undermined.   The donor site was closed in a primary fashion.  The pedicle was then rotated into position and sutured.  Once the tube was sutured into place, adequate blood supply was confirmed with blanching and refill.  The pedicle was then wrapped with xeroform gauze and dressed appropriately with a telfa and gauze bandage to ensure continued blood supply and protect the attached pedicle.
Interpolation Flap Text: A decision was made to reconstruct the defect utilizing an interpolation axial flap and a staged reconstruction.  A telfa template was made of the defect.  This telfa template was then used to outline the interpolation flap.  The donor area for the pedicle flap was then injected with anesthesia.  The flap was excised through the skin and subcutaneous tissue down to the layer of the underlying musculature.  The interpolation flap was carefully excised within this deep plane to maintain its blood supply.  The edges of the donor site were undermined.   The donor site was closed in a primary fashion.  The pedicle was then rotated into position and sutured.  Once the tube was sutured into place, adequate blood supply was confirmed with blanching and refill.  The pedicle was then wrapped with xeroform gauze and dressed appropriately with a telfa and gauze bandage to ensure continued blood supply and protect the attached pedicle.
Melolabial Interpolation Flap Text: A decision was made to reconstruct the defect utilizing an interpolation axial flap and a staged reconstruction.  A telfa template was made of the defect.  This telfa template was then used to outline the melolabial interpolation flap.  The donor area for the pedicle flap was then injected with anesthesia.  The flap was excised through the skin and subcutaneous tissue down to the layer of the underlying musculature.  The pedicle flap was carefully excised within this deep plane to maintain its blood supply.  The edges of the donor site were undermined.   The donor site was closed in a primary fashion.  The pedicle was then rotated into position and sutured.  Once the tube was sutured into place, adequate blood supply was confirmed with blanching and refill.  The pedicle was then wrapped with xeroform gauze and dressed appropriately with a telfa and gauze bandage to ensure continued blood supply and protect the attached pedicle.
Mastoid Interpolation Flap Text: A decision was made to reconstruct the defect utilizing an interpolation axial flap and a staged reconstruction.  A telfa template was made of the defect.  This telfa template was then used to outline the mastoid interpolation flap.  The donor area for the pedicle flap was then injected with anesthesia.  The flap was excised through the skin and subcutaneous tissue down to the layer of the underlying musculature.  The pedicle flap was carefully excised within this deep plane to maintain its blood supply.  The edges of the donor site were undermined.   The donor site was closed in a primary fashion.  The pedicle was then rotated into position and sutured.  Once the tube was sutured into place, adequate blood supply was confirmed with blanching and refill.  The pedicle was then wrapped with xeroform gauze and dressed appropriately with a telfa and gauze bandage to ensure continued blood supply and protect the attached pedicle.
Posterior Auricular Interpolation Flap Text: A decision was made to reconstruct the defect utilizing an interpolation axial flap and a staged reconstruction.  A telfa template was made of the defect.  This telfa template was then used to outline the posterior auricular interpolation flap.  The donor area for the pedicle flap was then injected with anesthesia.  The flap was excised through the skin and subcutaneous tissue down to the layer of the underlying musculature.  The pedicle flap was carefully excised within this deep plane to maintain its blood supply.  The edges of the donor site were undermined.   The donor site was closed in a primary fashion.  The pedicle was then rotated into position and sutured.  Once the tube was sutured into place, adequate blood supply was confirmed with blanching and refill.  The pedicle was then wrapped with xeroform gauze and dressed appropriately with a telfa and gauze bandage to ensure continued blood supply and protect the attached pedicle.
Paramedian Forehead Flap Text: A decision was made to reconstruct the defect utilizing an interpolation axial flap and a staged reconstruction.  A telfa template was made of the defect.  This telfa template was then used to outline the paramedian forehead pedicle flap.  The donor area for the pedicle flap was then injected with anesthesia.  The flap was excised through the skin and subcutaneous tissue down to the layer of the underlying musculature.  The pedicle flap was carefully excised within this deep plane to maintain its blood supply.  The edges of the donor site were undermined.   The donor site was closed in a primary fashion.  The pedicle was then rotated into position and sutured.  Once the tube was sutured into place, adequate blood supply was confirmed with blanching and refill.  The pedicle was then wrapped with xeroform gauze and dressed appropriately with a telfa and gauze bandage to ensure continued blood supply and protect the attached pedicle.
Lip Wedge Excision Repair Text: Given the location of the defect and the proximity to free margins a full thickness wedge repair was deemed most appropriate.  Using a sterile surgical marker, the appropriate repair was drawn incorporating the defect and placing the expected incisions perpendicular to the vermilion border.  The vermilion border was also meticulously outlined to ensure appropriate reapproximation during the repair.  The area thus outlined was incised through and through with a #15 scalpel blade.  The muscularis and dermis were reaproximated with deep sutures following hemostasis. Care was taken to realign the vermilion border before proceeding with the superficial closure.  Once the vermilion was realigned the superfical and mucosal closure was finished.
Ftsg Text: The defect edges were debeveled with a #15 scalpel blade.  Given the location of the defect, shape of the defect and the proximity to free margins a full thickness skin graft was deemed most appropriate.  Using a sterile surgical marker, the primary defect shape was transferred to the donor site. The area thus outlined was incised deep to adipose tissue with a #15 scalpel blade.  The harvested graft was then trimmed of adipose tissue until only dermis and epidermis was left.  The skin margins of the secondary defect were undermined to an appropriate distance in all directions utilizing iris scissors.  The secondary defect was closed with interrupted buried subcutaneous sutures.  The skin edges were then re-apposed with running  sutures.  The skin graft was then placed in the primary defect and oriented appropriately.
Split-Thickness Skin Graft Text: The defect edges were debeveled with a #15 scalpel blade.  Given the location of the defect, shape of the defect and the proximity to free margins a split thickness skin graft was deemed most appropriate.  Using a sterile surgical marker, the primary defect shape was transferred to the donor site. The split thickness graft was then harvested.  The skin graft was then placed in the primary defect and oriented appropriately.
Pinch Graft Text: The defect edges were debeveled with a #15 scalpel blade. Given the location of the defect, shape of the defect and the proximity to free margins a pinch graft was deemed most appropriate. Using a sterile surgical marker, the primary defect shape was transferred to the donor site. The area thus outlined was incised deep to adipose tissue with a #15 scalpel blade.  The harvested graft was then trimmed of adipose tissue until only dermis and epidermis was left. The skin margins of the secondary defect were undermined to an appropriate distance in all directions utilizing iris scissors.  The secondary defect was closed with interrupted buried subcutaneous sutures.  The skin edges were then re-apposed with running  sutures.  The skin graft was then placed in the primary defect and oriented appropriately.
Burow's Graft Text: The defect edges were debeveled with a #15 scalpel blade.  Given the location of the defect, shape of the defect, the proximity to free margins and the presence of a standing cone deformity a Burow's skin graft was deemed most appropriate. The standing cone was removed and this tissue was then trimmed to the shape of the primary defect. The adipose tissue was also removed until only dermis and epidermis were left.  The skin margins of the secondary defect were undermined to an appropriate distance in all directions utilizing iris scissors.  The secondary defect was closed with interrupted buried subcutaneous sutures.  The skin edges were then re-apposed with running  sutures.  The skin graft was then placed in the primary defect and oriented appropriately.
Cartilage Graft Text: The defect edges were debeveled with a #15 scalpel blade.  Given the location of the defect, shape of the defect, the fact the defect involved a full thickness cartilage defect a cartilage graft was deemed most appropriate.  An appropriate donor site was identified, cleansed, and anesthetized. The cartilage graft was then harvested and transferred to the recipient site, oriented appropriately and then sutured into place.  The secondary defect was then repaired using a primary closure.
Composite Graft Text: The defect edges were debeveled with a #15 scalpel blade.  Given the location of the defect, shape of the defect, the proximity to free margins and the fact the defect was full thickness a composite graft was deemed most appropriate.  The defect was outline and then transferred to the donor site.  A full thickness graft was then excised from the donor site. The graft was then placed in the primary defect, oriented appropriately and then sutured into place.  The secondary defect was then repaired using a primary closure.
Epidermal Autograft Text: The defect edges were debeveled with a #15 scalpel blade.  Given the location of the defect, shape of the defect and the proximity to free margins an epidermal autograft was deemed most appropriate.  Using a sterile surgical marker, the primary defect shape was transferred to the donor site. The epidermal graft was then harvested.  The skin graft was then placed in the primary defect and oriented appropriately.
Dermal Autograft Text: The defect edges were debeveled with a #15 scalpel blade.  Given the location of the defect, shape of the defect and the proximity to free margins a dermal autograft was deemed most appropriate.  Using a sterile surgical marker, the primary defect shape was transferred to the donor site. The area thus outlined was incised deep to adipose tissue with a #15 scalpel blade.  The harvested graft was then trimmed of adipose and epidermal tissue until only dermis was left.  The skin graft was then placed in the primary defect and oriented appropriately.
Skin Substitute Text: The defect edges were debeveled with a #15 scalpel blade.  Given the location of the defect, shape of the defect and the proximity to free margins a skin substitute graft was deemed most appropriate.  The graft material was trimmed to fit the size of the defect. The graft was then placed in the primary defect and oriented appropriately.
Tissue Cultured Epidermal Autograft Text: The defect edges were debeveled with a #15 scalpel blade.  Given the location of the defect, shape of the defect and the proximity to free margins a tissue cultured epidermal autograft was deemed most appropriate.  The graft was then trimmed to fit the size of the defect.  The graft was then placed in the primary defect and oriented appropriately.
Xenograft Text: The defect edges were debeveled with a #15 scalpel blade.  Given the location of the defect, shape of the defect and the proximity to free margins a xenograft was deemed most appropriate.  The graft was then trimmed to fit the size of the defect.  The graft was then placed in the primary defect and oriented appropriately.
Purse String (Intermediate) Text: Given the location of the defect and the characteristics of the surrounding skin a purse string intermediate closure was deemed most appropriate.  Undermining was performed circumferentially around the surgical defect.  A purse string suture was then placed and tightened.
Purse String (Simple) Text: Given the location of the defect and the characteristics of the surrounding skin a purse string simple closure was deemed most appropriate.  Undermining was performed circumferentially around the surgical defect.  A purse string suture was then placed and tightened.
Complex Repair And Single Advancement Flap Text: The defect edges were debeveled with a #15 scalpel blade.  The primary defect was closed partially with a complex linear closure.  Given the location of the remaining defect, shape of the defect and the proximity to free margins a single advancement flap was deemed most appropriate for complete closure of the defect.  Using a sterile surgical marker, an appropriate advancement flap was drawn incorporating the defect and placing the expected incisions within the relaxed skin tension lines where possible.    The area thus outlined was incised deep to adipose tissue with a #15 scalpel blade.  The skin margins were undermined to an appropriate distance in all directions utilizing iris scissors.
Complex Repair And Double Advancement Flap Text: The defect edges were debeveled with a #15 scalpel blade.  The primary defect was closed partially with a complex linear closure.  Given the location of the remaining defect, shape of the defect and the proximity to free margins a double advancement flap was deemed most appropriate for complete closure of the defect.  Using a sterile surgical marker, an appropriate advancement flap was drawn incorporating the defect and placing the expected incisions within the relaxed skin tension lines where possible.    The area thus outlined was incised deep to adipose tissue with a #15 scalpel blade.  The skin margins were undermined to an appropriate distance in all directions utilizing iris scissors.
Complex Repair And Modified Advancement Flap Text: The defect edges were debeveled with a #15 scalpel blade.  The primary defect was closed partially with a complex linear closure.  Given the location of the remaining defect, shape of the defect and the proximity to free margins a modified advancement flap was deemed most appropriate for complete closure of the defect.  Using a sterile surgical marker, an appropriate advancement flap was drawn incorporating the defect and placing the expected incisions within the relaxed skin tension lines where possible.    The area thus outlined was incised deep to adipose tissue with a #15 scalpel blade.  The skin margins were undermined to an appropriate distance in all directions utilizing iris scissors.
Complex Repair And A-T Advancement Flap Text: The defect edges were debeveled with a #15 scalpel blade.  The primary defect was closed partially with a complex linear closure.  Given the location of the remaining defect, shape of the defect and the proximity to free margins an A-T advancement flap was deemed most appropriate for complete closure of the defect.  Using a sterile surgical marker, an appropriate advancement flap was drawn incorporating the defect and placing the expected incisions within the relaxed skin tension lines where possible.    The area thus outlined was incised deep to adipose tissue with a #15 scalpel blade.  The skin margins were undermined to an appropriate distance in all directions utilizing iris scissors.
Complex Repair And O-T Advancement Flap Text: The defect edges were debeveled with a #15 scalpel blade.  The primary defect was closed partially with a complex linear closure.  Given the location of the remaining defect, shape of the defect and the proximity to free margins an O-T advancement flap was deemed most appropriate for complete closure of the defect.  Using a sterile surgical marker, an appropriate advancement flap was drawn incorporating the defect and placing the expected incisions within the relaxed skin tension lines where possible.    The area thus outlined was incised deep to adipose tissue with a #15 scalpel blade.  The skin margins were undermined to an appropriate distance in all directions utilizing iris scissors.
Complex Repair And O-L Flap Text: The defect edges were debeveled with a #15 scalpel blade.  The primary defect was closed partially with a complex linear closure.  Given the location of the remaining defect, shape of the defect and the proximity to free margins an O-L flap was deemed most appropriate for complete closure of the defect.  Using a sterile surgical marker, an appropriate flap was drawn incorporating the defect and placing the expected incisions within the relaxed skin tension lines where possible.    The area thus outlined was incised deep to adipose tissue with a #15 scalpel blade.  The skin margins were undermined to an appropriate distance in all directions utilizing iris scissors.
Complex Repair And Bilobe Flap Text: The defect edges were debeveled with a #15 scalpel blade.  The primary defect was closed partially with a complex linear closure.  Given the location of the remaining defect, shape of the defect and the proximity to free margins a bilobe flap was deemed most appropriate for complete closure of the defect.  Using a sterile surgical marker, an appropriate advancement flap was drawn incorporating the defect and placing the expected incisions within the relaxed skin tension lines where possible.    The area thus outlined was incised deep to adipose tissue with a #15 scalpel blade.  The skin margins were undermined to an appropriate distance in all directions utilizing iris scissors.
Complex Repair And Melolabial Flap Text: The defect edges were debeveled with a #15 scalpel blade.  The primary defect was closed partially with a complex linear closure.  Given the location of the remaining defect, shape of the defect and the proximity to free margins a melolabial flap was deemed most appropriate for complete closure of the defect.  Using a sterile surgical marker, an appropriate advancement flap was drawn incorporating the defect and placing the expected incisions within the relaxed skin tension lines where possible.    The area thus outlined was incised deep to adipose tissue with a #15 scalpel blade.  The skin margins were undermined to an appropriate distance in all directions utilizing iris scissors.
Complex Repair And Rotation Flap Text: The defect edges were debeveled with a #15 scalpel blade.  The primary defect was closed partially with a complex linear closure.  Given the location of the remaining defect, shape of the defect and the proximity to free margins a rotation flap was deemed most appropriate for complete closure of the defect.  Using a sterile surgical marker, an appropriate advancement flap was drawn incorporating the defect and placing the expected incisions within the relaxed skin tension lines where possible.    The area thus outlined was incised deep to adipose tissue with a #15 scalpel blade.  The skin margins were undermined to an appropriate distance in all directions utilizing iris scissors.
Complex Repair And Rhombic Flap Text: The defect edges were debeveled with a #15 scalpel blade.  The primary defect was closed partially with a complex linear closure.  Given the location of the remaining defect, shape of the defect and the proximity to free margins a rhombic flap was deemed most appropriate for complete closure of the defect.  Using a sterile surgical marker, an appropriate advancement flap was drawn incorporating the defect and placing the expected incisions within the relaxed skin tension lines where possible.    The area thus outlined was incised deep to adipose tissue with a #15 scalpel blade.  The skin margins were undermined to an appropriate distance in all directions utilizing iris scissors.
Complex Repair And Transposition Flap Text: The defect edges were debeveled with a #15 scalpel blade.  The primary defect was closed partially with a complex linear closure.  Given the location of the remaining defect, shape of the defect and the proximity to free margins a transposition flap was deemed most appropriate for complete closure of the defect.  Using a sterile surgical marker, an appropriate advancement flap was drawn incorporating the defect and placing the expected incisions within the relaxed skin tension lines where possible.    The area thus outlined was incised deep to adipose tissue with a #15 scalpel blade.  The skin margins were undermined to an appropriate distance in all directions utilizing iris scissors.
Complex Repair And V-Y Plasty Text: The defect edges were debeveled with a #15 scalpel blade.  The primary defect was closed partially with a complex linear closure.  Given the location of the remaining defect, shape of the defect and the proximity to free margins a V-Y plasty was deemed most appropriate for complete closure of the defect.  Using a sterile surgical marker, an appropriate advancement flap was drawn incorporating the defect and placing the expected incisions within the relaxed skin tension lines where possible.    The area thus outlined was incised deep to adipose tissue with a #15 scalpel blade.  The skin margins were undermined to an appropriate distance in all directions utilizing iris scissors.
Complex Repair And M Plasty Text: The defect edges were debeveled with a #15 scalpel blade.  The primary defect was closed partially with a complex linear closure.  Given the location of the remaining defect, shape of the defect and the proximity to free margins an M plasty was deemed most appropriate for complete closure of the defect.  Using a sterile surgical marker, an appropriate advancement flap was drawn incorporating the defect and placing the expected incisions within the relaxed skin tension lines where possible.    The area thus outlined was incised deep to adipose tissue with a #15 scalpel blade.  The skin margins were undermined to an appropriate distance in all directions utilizing iris scissors.
Complex Repair And Double M Plasty Text: The defect edges were debeveled with a #15 scalpel blade.  The primary defect was closed partially with a complex linear closure.  Given the location of the remaining defect, shape of the defect and the proximity to free margins a double M plasty was deemed most appropriate for complete closure of the defect.  Using a sterile surgical marker, an appropriate advancement flap was drawn incorporating the defect and placing the expected incisions within the relaxed skin tension lines where possible.    The area thus outlined was incised deep to adipose tissue with a #15 scalpel blade.  The skin margins were undermined to an appropriate distance in all directions utilizing iris scissors.
Complex Repair And W Plasty Text: The defect edges were debeveled with a #15 scalpel blade.  The primary defect was closed partially with a complex linear closure.  Given the location of the remaining defect, shape of the defect and the proximity to free margins a W plasty was deemed most appropriate for complete closure of the defect.  Using a sterile surgical marker, an appropriate advancement flap was drawn incorporating the defect and placing the expected incisions within the relaxed skin tension lines where possible.    The area thus outlined was incised deep to adipose tissue with a #15 scalpel blade.  The skin margins were undermined to an appropriate distance in all directions utilizing iris scissors.
Complex Repair And Z Plasty Text: The defect edges were debeveled with a #15 scalpel blade.  The primary defect was closed partially with a complex linear closure.  Given the location of the remaining defect, shape of the defect and the proximity to free margins a Z plasty was deemed most appropriate for complete closure of the defect.  Using a sterile surgical marker, an appropriate advancement flap was drawn incorporating the defect and placing the expected incisions within the relaxed skin tension lines where possible.    The area thus outlined was incised deep to adipose tissue with a #15 scalpel blade.  The skin margins were undermined to an appropriate distance in all directions utilizing iris scissors.
Complex Repair And Dorsal Nasal Flap Text: The defect edges were debeveled with a #15 scalpel blade.  The primary defect was closed partially with a complex linear closure.  Given the location of the remaining defect, shape of the defect and the proximity to free margins a dorsal nasal flap was deemed most appropriate for complete closure of the defect.  Using a sterile surgical marker, an appropriate flap was drawn incorporating the defect and placing the expected incisions within the relaxed skin tension lines where possible.    The area thus outlined was incised deep to adipose tissue with a #15 scalpel blade.  The skin margins were undermined to an appropriate distance in all directions utilizing iris scissors.
Complex Repair And Ftsg Text: The defect edges were debeveled with a #15 scalpel blade.  The primary defect was closed partially with a complex linear closure.  Given the location of the defect, shape of the defect and the proximity to free margins a full thickness skin graft was deemed most appropriate to repair the remaining defect.  The graft was trimmed to fit the size of the remaining defect.  The graft was then placed in the primary defect, oriented appropriately, and sutured into place.
Complex Repair And Burow's Graft Text: The defect edges were debeveled with a #15 scalpel blade.  The primary defect was closed partially with a complex linear closure.  Given the location of the defect, shape of the defect, the proximity to free margins and the presence of a standing cone deformity a Burow's graft was deemed most appropriate to repair the remaining defect.  The graft was trimmed to fit the size of the remaining defect.  The graft was then placed in the primary defect, oriented appropriately, and sutured into place.
Complex Repair And Split-Thickness Skin Graft Text: The defect edges were debeveled with a #15 scalpel blade.  The primary defect was closed partially with a complex linear closure.  Given the location of the defect, shape of the defect and the proximity to free margins a split thickness skin graft was deemed most appropriate to repair the remaining defect.  The graft was trimmed to fit the size of the remaining defect.  The graft was then placed in the primary defect, oriented appropriately, and sutured into place.
Complex Repair And Epidermal Autograft Text: The defect edges were debeveled with a #15 scalpel blade.  The primary defect was closed partially with a complex linear closure.  Given the location of the defect, shape of the defect and the proximity to free margins an epidermal autograft was deemed most appropriate to repair the remaining defect.  The graft was trimmed to fit the size of the remaining defect.  The graft was then placed in the primary defect, oriented appropriately, and sutured into place.
Complex Repair And Dermal Autograft Text: The defect edges were debeveled with a #15 scalpel blade.  The primary defect was closed partially with a complex linear closure.  Given the location of the defect, shape of the defect and the proximity to free margins an dermal autograft was deemed most appropriate to repair the remaining defect.  The graft was trimmed to fit the size of the remaining defect.  The graft was then placed in the primary defect, oriented appropriately, and sutured into place.
Complex Repair And Tissue Cultured Epidermal Autograft Text: The defect edges were debeveled with a #15 scalpel blade.  The primary defect was closed partially with a complex linear closure.  Given the location of the defect, shape of the defect and the proximity to free margins an tissue cultured epidermal autograft was deemed most appropriate to repair the remaining defect.  The graft was trimmed to fit the size of the remaining defect.  The graft was then placed in the primary defect, oriented appropriately, and sutured into place.
Complex Repair And Xenograft Text: The defect edges were debeveled with a #15 scalpel blade.  The primary defect was closed partially with a complex linear closure.  Given the location of the defect, shape of the defect and the proximity to free margins a xenograft was deemed most appropriate to repair the remaining defect.  The graft was trimmed to fit the size of the remaining defect.  The graft was then placed in the primary defect, oriented appropriately, and sutured into place.
Complex Repair And Skin Substitute Graft Text: The defect edges were debeveled with a #15 scalpel blade.  The primary defect was closed partially with a complex linear closure.  Given the location of the remaining defect, shape of the defect and the proximity to free margins a skin substitute graft was deemed most appropriate to repair the remaining defect.  The graft was trimmed to fit the size of the remaining defect.  The graft was then placed in the primary defect, oriented appropriately, and sutured into place.
Consent: Written consent was obtained from the patient. The risks and benefits to therapy were discussed in detail. Specifically, the risks of infection, scarring, bleeding, prolonged wound healing, incomplete removal, allergy to anesthesia, nerve injury and recurrence were addressed. Prior to the procedure, the treatment site was clearly identified and confirmed by the patient. All components of Universal Protocol/PAUSE Rule completed.
Render Post-Care Instructions In Note?: yes
Post-Care Instructions: I reviewed with the patient in detail post-care instructions. Patient is not to engage in any heavy lifting, exercise, or swimming for the next 14 days. Should the patient develop any fevers, chills, bleeding, severe pain patient will contact the office immediately.
Home Suture Removal Text: Patient was provided a home suture removal kit and will remove their sutures at home.  If they have any questions or difficulties they will call the office.
Where Do You Want The Question To Include Opioid Counseling Located?: Case Summary Tab
Billing Type: Third-Party Bill
Information: Selecting Yes will display possible errors in your note based on the variables you have selected. This validation is only offered as a suggestion for you. PLEASE NOTE THAT THE VALIDATION TEXT WILL BE REMOVED WHEN YOU FINALIZE YOUR NOTE. IF YOU WANT TO FAX A PRELIMINARY NOTE YOU WILL NEED TO TOGGLE THIS TO 'NO' IF YOU DO NOT WANT IT IN YOUR FAXED NOTE.

## 2024-02-07 ENCOUNTER — HOSPITAL ENCOUNTER (OUTPATIENT)
Dept: LAB | Facility: MEDICAL CENTER | Age: 34
End: 2024-02-07
Attending: STUDENT IN AN ORGANIZED HEALTH CARE EDUCATION/TRAINING PROGRAM
Payer: COMMERCIAL

## 2024-02-07 DIAGNOSIS — G40.909 SEIZURE DISORDER (HCC): ICD-10-CM

## 2024-02-07 PROCEDURE — 36415 COLL VENOUS BLD VENIPUNCTURE: CPT

## 2024-02-07 PROCEDURE — 80177 DRUG SCRN QUAN LEVETIRACETAM: CPT

## 2024-02-10 LAB — LEVETIRACETAM SERPL-MCNC: 65 UG/ML (ref 10–40)

## 2024-02-21 ENCOUNTER — HOSPITAL ENCOUNTER (OUTPATIENT)
Dept: LAB | Facility: MEDICAL CENTER | Age: 34
End: 2024-02-21
Attending: STUDENT IN AN ORGANIZED HEALTH CARE EDUCATION/TRAINING PROGRAM
Payer: COMMERCIAL

## 2024-02-21 DIAGNOSIS — G40.909 SEIZURE DISORDER (HCC): ICD-10-CM

## 2024-02-21 PROCEDURE — 36415 COLL VENOUS BLD VENIPUNCTURE: CPT

## 2024-02-21 PROCEDURE — 80177 DRUG SCRN QUAN LEVETIRACETAM: CPT

## 2024-02-23 LAB — LEVETIRACETAM SERPL-MCNC: 35 UG/ML (ref 10–40)

## 2024-03-08 ENCOUNTER — HOSPITAL ENCOUNTER (OUTPATIENT)
Dept: LAB | Facility: MEDICAL CENTER | Age: 34
End: 2024-03-08
Attending: OBSTETRICS & GYNECOLOGY
Payer: COMMERCIAL

## 2024-03-08 LAB
25(OH)D3 SERPL-MCNC: 65 NG/ML (ref 30–100)
ABO GROUP BLD: NORMAL
BASOPHILS # BLD AUTO: 0.3 % (ref 0–1.8)
BASOPHILS # BLD: 0.03 K/UL (ref 0–0.12)
BLD GP AB SCN SERPL QL: NORMAL
EOSINOPHIL # BLD AUTO: 0.09 K/UL (ref 0–0.51)
EOSINOPHIL NFR BLD: 0.9 % (ref 0–6.9)
ERYTHROCYTE [DISTWIDTH] IN BLOOD BY AUTOMATED COUNT: 40.8 FL (ref 35.9–50)
HBV SURFACE AG SER QL: NORMAL
HCT VFR BLD AUTO: 39.4 % (ref 37–47)
HCV AB SER QL: NORMAL
HGB BLD-MCNC: 14.1 G/DL (ref 12–16)
HIV 1+2 AB+HIV1 P24 AG SERPL QL IA: NORMAL
IMM GRANULOCYTES # BLD AUTO: 0.03 K/UL (ref 0–0.11)
IMM GRANULOCYTES NFR BLD AUTO: 0.3 % (ref 0–0.9)
LYMPHOCYTES # BLD AUTO: 1.64 K/UL (ref 1–4.8)
LYMPHOCYTES NFR BLD: 16.8 % (ref 22–41)
MCH RBC QN AUTO: 32.3 PG (ref 27–33)
MCHC RBC AUTO-ENTMCNC: 35.8 G/DL (ref 32.2–35.5)
MCV RBC AUTO: 90.2 FL (ref 81.4–97.8)
MONOCYTES # BLD AUTO: 0.61 K/UL (ref 0–0.85)
MONOCYTES NFR BLD AUTO: 6.3 % (ref 0–13.4)
NEUTROPHILS # BLD AUTO: 7.34 K/UL (ref 1.82–7.42)
NEUTROPHILS NFR BLD: 75.4 % (ref 44–72)
NRBC # BLD AUTO: 0 K/UL
NRBC BLD-RTO: 0 /100 WBC (ref 0–0.2)
PLATELET # BLD AUTO: 162 K/UL (ref 164–446)
PMV BLD AUTO: 11.2 FL (ref 9–12.9)
RBC # BLD AUTO: 4.37 M/UL (ref 4.2–5.4)
RH BLD: NORMAL
RUBV AB SER QL: 181 IU/ML
T PALLIDUM AB SER QL IA: NORMAL
WBC # BLD AUTO: 9.7 K/UL (ref 4.8–10.8)

## 2024-03-08 PROCEDURE — 87340 HEPATITIS B SURFACE AG IA: CPT

## 2024-03-08 PROCEDURE — 86762 RUBELLA ANTIBODY: CPT

## 2024-03-08 PROCEDURE — 86901 BLOOD TYPING SEROLOGIC RH(D): CPT

## 2024-03-08 PROCEDURE — 86900 BLOOD TYPING SEROLOGIC ABO: CPT

## 2024-03-08 PROCEDURE — 86787 VARICELLA-ZOSTER ANTIBODY: CPT

## 2024-03-08 PROCEDURE — 87086 URINE CULTURE/COLONY COUNT: CPT

## 2024-03-08 PROCEDURE — 86803 HEPATITIS C AB TEST: CPT

## 2024-03-08 PROCEDURE — 86780 TREPONEMA PALLIDUM: CPT

## 2024-03-08 PROCEDURE — 85025 COMPLETE CBC W/AUTO DIFF WBC: CPT

## 2024-03-08 PROCEDURE — 87389 HIV-1 AG W/HIV-1&-2 AB AG IA: CPT

## 2024-03-08 PROCEDURE — 86850 RBC ANTIBODY SCREEN: CPT

## 2024-03-08 PROCEDURE — 36415 COLL VENOUS BLD VENIPUNCTURE: CPT

## 2024-03-08 PROCEDURE — 82306 VITAMIN D 25 HYDROXY: CPT

## 2024-03-10 LAB — VZV IGG SER IA-ACNC: 860.1 IV

## 2024-03-11 ENCOUNTER — OFFICE VISIT (OUTPATIENT)
Dept: NEUROLOGY | Facility: MEDICAL CENTER | Age: 34
End: 2024-03-11
Attending: STUDENT IN AN ORGANIZED HEALTH CARE EDUCATION/TRAINING PROGRAM
Payer: COMMERCIAL

## 2024-03-11 ENCOUNTER — TELEPHONE (OUTPATIENT)
Dept: NEUROLOGY | Facility: MEDICAL CENTER | Age: 34
End: 2024-03-11

## 2024-03-11 VITALS
DIASTOLIC BLOOD PRESSURE: 70 MMHG | OXYGEN SATURATION: 99 % | WEIGHT: 143.08 LBS | TEMPERATURE: 97.5 F | HEIGHT: 64 IN | BODY MASS INDEX: 24.43 KG/M2 | HEART RATE: 83 BPM | SYSTOLIC BLOOD PRESSURE: 110 MMHG

## 2024-03-11 DIAGNOSIS — G40.109 LOCALIZATION-RELATED EPILEPSY (HCC): ICD-10-CM

## 2024-03-11 DIAGNOSIS — Z3A.10 10 WEEKS GESTATION OF PREGNANCY: ICD-10-CM

## 2024-03-11 DIAGNOSIS — Z86.19 HISTORY OF VIRAL ENCEPHALITIS: ICD-10-CM

## 2024-03-11 DIAGNOSIS — R93.0 ABNORMAL MRI OF HEAD: ICD-10-CM

## 2024-03-11 LAB
BACTERIA UR CULT: NORMAL
SIGNIFICANT IND 70042: NORMAL
SITE SITE: NORMAL
SOURCE SOURCE: NORMAL

## 2024-03-11 PROCEDURE — 99215 OFFICE O/P EST HI 40 MIN: CPT | Performed by: STUDENT IN AN ORGANIZED HEALTH CARE EDUCATION/TRAINING PROGRAM

## 2024-03-11 PROCEDURE — 99211 OFF/OP EST MAY X REQ PHY/QHP: CPT | Performed by: STUDENT IN AN ORGANIZED HEALTH CARE EDUCATION/TRAINING PROGRAM

## 2024-03-11 RX ORDER — LEVETIRACETAM 750 MG/1
1500 TABLET ORAL 2 TIMES DAILY
Qty: 360 TABLET | Refills: 1 | Status: SHIPPED | OUTPATIENT
Start: 2024-03-11 | End: 2024-09-07

## 2024-03-11 ASSESSMENT — FIBROSIS 4 INDEX: FIB4 SCORE: 0.89

## 2024-03-11 ASSESSMENT — PATIENT HEALTH QUESTIONNAIRE - PHQ9: CLINICAL INTERPRETATION OF PHQ2 SCORE: 0

## 2024-03-11 NOTE — TELEPHONE ENCOUNTER
Received Refill PA request via [facility]  for levetiracetam (KEPPRA) 750 MG tablet . (Quantity:360, Day Supply:90)     Insurance: [facility]  Member ID:  [id number]  BIN: [id number]  PCN: [id number]  Group: [id number]     Ran Test claim via [facility] [facility] for a $229.28 copay. Will outreach to patient to offer specialty pharmacy services and or release to preferred pharmacy

## 2024-03-11 NOTE — PROGRESS NOTES
[facility]  Follow up visit    Patient name: Celestine Calderón  YOB: 1990  MRN: [id number]  Date of visit: 3/11/2024     Background:    Celestine Calderón is a 33 y.o. 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 MRI of the brain notable for L temporal lobe gliosis.      Details from initial visit 10/5/2023  She had initially presented in September 2022 with multiple urgent care visits for flu-like symptoms and headache. She was discharged multiple times until her  one morning several days later heard odd noises coming from their bedroom, went to the bathroom and witnessed her having an apparent generalized tonic clonic seizure.      She had a post-ictal period and does not recall EMS arriving or being transported to [facility] via ambulance. Workup was notable for CSF studies c/w a viral versus aseptic meningitis. Virus was not identified ultimately, and autoimmune antibody panel was negative. She improved with supportive care and was discharged on 9/9/22 after being admitted on 9/6/2022.      Keppra was started for seizure treatment and she had a routine EEG during the inpatient stay which showed intermittent runs of rhythmic slowing involving the temporal region. MRI Brain showed abnormal signal in the left temporal lobe.      She had another seizure on the way home from the hospital. After discharge, she had a period of time being poorly responsive,      She has been following on an outpatient basis with Dr. Bland and is seeking a second opinion for more detailed explanation of the findings and rationale of treatment.      Essentially, she was altered after the hospitalization with poor balance, barely able to sit up in a chair at the time of follow up visits in October 2022. She had word finding difficulty that has improved gradually. Keppra dose was  "increased and Lyrica was added for seizure control and for pain, and her mental status improved significantly.      Medications reviewed in detail: Lyrica was started at 50 mg BID, increased to 75 mg BID. This was primarily for pain/headaches rather than seizures. The pain has improved.  After the hospitalization the Keppra was increased from 1000 mg BID to 1000 mg TID.      She has occasional feelings of being in a fog and \"out of body\". She has not had any clinical seizures on current regimen.         Mood: stable, talks to friend who is therapy      Barriers to taking medication appropriately: no     Driving: yes     Vitamin D: not taking     Women's issues in epilepsy: planning future pregnancy. IUD removed late July 2022. At the beginning of August she had initially thought she was pregnant prior to menses returning. The couple was planning pregnancy prior to the viral encephalitis.       Interval history:  She is unaccompanied to the visit. She is currently 10 weeks pregnant.     She feels significantly less irritable since weaning off the Lyrica. She weaned off fully in October. She has been tolerating the Keppra without significant side effects.     She feels slightly more anxious than usual but attributes this to the pregnancy in general.     She has not had unusual out of body symptoms. She wonders if these symptoms could have been related to pregnancy in the first trimester.       Current Medications:   Current Outpatient Medications:     levetiracetam (KEPPRA) 750 MG tablet, Take 2 Tablets by mouth 2 times a day for 180 days., Disp: 360 Tablet, Rfl: 1    Allergies:   Allergies   Allergen Reactions    Penicillins Hives     Family member states reaction occurred in childhood.    Penicillin G          Physical Exam:   Ambulatory Vitals  Vitals:    03/11/24 1046   BP: 110/70   Pulse: 83   Temp: 36.4 °C (97.5 °F)   SpO2: 99%       Constitutional: Well-developed, well-nourished, good hygiene. Appears stated " age.  Respiratory: normal respiratory effort  Skin: Warm, dry, intact. No rashes observed.  Neurologic:   Mental Status: Awake, alert, oriented x 4.   Speech: Fluent with normal prosody.   Memory: Able to recall recent and remote events accurately.    Concentration: Attentive. Able to focus on history and follow multi-step commands.   Fund of Knowledge: Appropriate.   Cranial Nerves:    CN II: PERRL, visual fields full    CN III, IV, VI: EOMI without nystagmus    CN V: Facial sensation intact and symmetric in all 3 trigeminal distributions    CN VII: No facial asymmetry    CN VIII: Hearing intact to voice    CN IX and X: Palate elevates symmetrically, gag reflex not tested    CN XI: Symmetric shoulder shrug     CN XII: Tongue midline   Motor: 5/5 in upper and lower extremities bilaterally   Sensory: Intact and equal to light touch diffusely    Coordination: No evidence of past-pointing on finger to nose testing, no dysdiadochokinesia. Heel to shin intact.    Gait: ambulates steadily without assistive device. Romberg negative. Able to perform tandem gait.    Movements: No resting tremors or abnormal movements observed.     Studies:      Labs reviewed:  CSF autoimmune encephalopathy antibodies negative     Imaging:   MRI Brain wwo contrast 8/9/2023  IMPRESSION:     Stable abnormal diffuse infiltrating T2 hyperintensity noted involving left temporal lobe without contrast enhancement. The differential diagnosis includes gliosis secondary to the encephalitis and low-grade glioma.     MRI Brain wwo contrast 2/16/23       EEG Results:     Ambulatory EEG 11/9/2023  INTERPRETATION:  Normal ambulatory EEG recording in the awake and drowsy/sleep state(s):  -No regional slowing or persistent focal asymmetries were seen.  -No epileptiform discharges or other epileptiform phenomena seen.  -No seizures. Clinical correlation is recommended.  -Clinical Events: events listed were not of the patient's typical seizure semiology, no  abnormal epileptiform correlates.     Routine EEG 9/12/22  INTERPRETATION:   Abnormal video EEG recording in the awake, drowsy, and sleep state(s):  - Mild to moderate background slowing suggestive of diffuse/multifocal cerebral dysfunction and consistent with a nonspecific encephalopathy.  - Intermittent runs of generalized 1.5 to 2.5 Hz rhythmic or quasirhythmic slowing was noted, often asymmetrical and dyssynchronous.  Occasionally generalized slowing was followed by rhythmic left temporal maximal l 1 to 2 Hz rhythmic activity.  These findings may reflect a mild encephalopathy as noted above.  However the dyssynchronous generalized slowing and at times focal nature of the slowing over the left temporal lobe may suggest underlying epileptogenic potential with an increased risk for seizures.  However, no seizures were seen.  Clinical and radiographic correlation recommended.  -There is ongoing concern for seizure activity consider longer-term EEG monitoring .          Assessment/Plan:   Celestine Calderón is a 33 y.o. 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 Prior EEG and imaging results are concordant. A repeat MRI from August 2023 shows ongoing T2 hyperintensity involving the left temporal lobe as compared to the initial MRI from February 2023. In the differential from radiology, low-grade glioma is mentioned as a possibility. I have a low suspicion for this given she had CSF results at the time of the acute illness that were consistent with an overall picture of viral encephalitis. In addition, with her overall clinical stability and current pregnancy I believe it is reasonable to hold off on any additional neuroimaging for now.     She is taking Keppra 1500 mg twice daily with no seizures. Recent level 1 month ago was 65 and 35 two weeks ago. No dosing changes made today. Plan for monthly Keppra levels during pregnancy. Counseling provided  "regarding epilepsy and pregnancy. She is continuing to take folate 1 mg daily. She is aware to contact the office if there are any breakthrough seizures.     After delivery, she should maintain good sleep as much as possible to prevent breakthrough seizures and does have help with caring for her child planned. Discussed that Keppra level can rise after delivery though since it is not generally considered \"toxic\" at higher doses there is not a significant urgency to obtaining a repeat level unless she is able to do so with ease approximately 1 week after delivery. Discussed that even if level is elevated, the seizure freedom at current dose, which was started prior to pregnancy, may be continued on these grounds alone.         Patient instructions:   -Monthly Keppra levels      Please let our office know if you have any changes in your seizure frequency and/characteristics. Otherwise, please keep the diary of your events and bring it with you at the time of your next follow up visit with our office.     Please take vitamin D3 [id number] international units daily.     Please take folic acid 1 mg daily. This is an over-the-counter supplement that is recommended to prevent certain developmental problems in your baby, in case you become pregnant in the future.    Please let our office know as soon as you become pregnant or plan to become pregnant.    If you are caring for a baby/young child, please make sure to be sitting on a soft surface while holding your baby/young child, so in case you have a seizure, your baby/young child is not injured due to fall.     Please let us know if you observe that your baby is excessively sleepy/has other changes and the pediatrician feels that there are no other explanations except possible adverse effects of antiseizure medication(s) your are taking while nursing your baby.     Please note that the following might precipitate seizures: missed doses of antiseizure medications, being sick " with fever, stress, fatigue, sleep deprivation, not eating regularly, not drinking enough water, drinking too much alcohol, stopping alcohol suddenly if you are currently using it on a regular/daily basis, and/or using recreational drugs, among others.    Please note that the following might lead to an injury, potentially a life-threatening injury, in case you have a seizure and/or lose awareness while:   - being in a large body of water by yourself, such as bath, pool, lake, ocean, among others (risk of drowning)   - being on unprotected heights (risk of fall)   - being around and/or operating heavy machinery (risk of injury)   - being around open fire/hot surfaces (risk of burns)   - any other activities/circumstances, in which if you lose awareness, you might injure yourself and/or others.    Please call for help (crisis line and/or 911) in case you have thoughts of harming yourself and/or others.    ------------------------------------------------------------------------------------------  Instructions for your family/caregivers:  Please call 911 if the patient has a seizure longer than 2-3 minutes, if seizures are back to back without her recovering to her baseline, or she does not start recovering within 5-10 minutes after the seizure stops. During the seizure - please turn her on her side, please make sure her head is protected (for example, you should put a pillow under her head, if one is available), and please do not put anything in her mouth.   -------------------------------------------------------------------------------------------    It is important that your seizures are well controlled and you have none or have them rarely. In addition to avoiding injury related to breakthrough seizures, frequent seizures increase risk of SUDEP (sudden unexpected death in epilepsy), where a person goes into a seizure and then never wakes up - this is a rare complication of seizure disorder; one of the best ways to  prevent it is to control your seizures well.     Due to the high volume of patients we are trying to help, your physician will not be able to respond by phone or in MyChart to your routine concerns between appointments.  This does not reflect a lack of interest or concern for you or your diagnosis.  Please bring these questions and concerns to your appointment where your physician can answer.  Please relay more pressing concerns to our office, either via MyChart, or by phone; if not able to reach us please visit nearby Urgent Care Center or Emergency Department.  If any emergent medical needs, please seek emergent medical help and/or call 911.    Please note that we are not able to fill out paperwork that might be related to your work, utility company, disability, and/or driving, among others, in between the visits.  Please schedule a dedicated appointment to address your paperwork, so we can do that in a timely manner.  This is not due to lack of concern or interest for your disease-related work/administrative problems, but to make sure that we provide the best possible care and to fill out your paperwork in a correct and timely manner.    Thank you for entrusting your neurological care to [facility] and we look forward to continuing to serve you.         Follow up in approximately 4 months.     Brittney Sweet M.D.   Diplomate, Neurology with Special Qualification in Epilepsy, American Board of Psychiatry and Neurology   of Clinical Neurology, [employer]  Level III Epilepsy Center, Department of Neurology at [facility]   3/11/2024      During today's encounter we discussed available treatment options and their individual side effect profiles. Total encounter time caring for patient today 45 minutes.

## 2024-03-11 NOTE — PATIENT INSTRUCTIONS
-Monthly Keppra levels      Please let our office know if you have any changes in your seizure frequency and/characteristics. Otherwise, please keep the diary of your events and bring it with you at the time of your next follow up visit with our office.     Please take vitamin D3 [id number] international units daily.     Please take folic acid 1 mg daily. This is an over-the-counter supplement that is recommended to prevent certain developmental problems in your baby, in case you become pregnant in the future.    Please let our office know as soon as you become pregnant or plan to become pregnant.    If you are caring for a baby/young child, please make sure to be sitting on a soft surface while holding your baby/young child, so in case you have a seizure, your baby/young child is not injured due to fall.     Please let us know if you observe that your baby is excessively sleepy/has other changes and the pediatrician feels that there are no other explanations except possible adverse effects of antiseizure medication(s) your are taking while nursing your baby.     Please note that the following might precipitate seizures: missed doses of antiseizure medications, being sick with fever, stress, fatigue, sleep deprivation, not eating regularly, not drinking enough water, drinking too much alcohol, stopping alcohol suddenly if you are currently using it on a regular/daily basis, and/or using recreational drugs, among others.    Please note that the following might lead to an injury, potentially a life-threatening injury, in case you have a seizure and/or lose awareness while:   - being in a large body of water by yourself, such as bath, pool, lake, ocean, among others (risk of drowning)   - being on unprotected heights (risk of fall)   - being around and/or operating heavy machinery (risk of injury)   - being around open fire/hot surfaces (risk of burns)   - any other activities/circumstances, in which if you lose  awareness, you might injure yourself and/or others.    Please call for help (crisis line and/or 911) in case you have thoughts of harming yourself and/or others.    ------------------------------------------------------------------------------------------  Instructions for your family/caregivers:  Please call 911 if the patient has a seizure longer than 2-3 minutes, if seizures are back to back without her recovering to her baseline, or she does not start recovering within 5-10 minutes after the seizure stops. During the seizure - please turn her on her side, please make sure her head is protected (for example, you should put a pillow under her head, if one is available), and please do not put anything in her mouth.   -------------------------------------------------------------------------------------------    It is important that your seizures are well controlled and you have none or have them rarely. In addition to avoiding injury related to breakthrough seizures, frequent seizures increase risk of SUDEP (sudden unexpected death in epilepsy), where a person goes into a seizure and then never wakes up - this is a rare complication of seizure disorder; one of the best ways to prevent it is to control your seizures well.     Due to the high volume of patients we are trying to help, your physician will not be able to respond by phone or in 2canhart to your routine concerns between appointments.  This does not reflect a lack of interest or concern for you or your diagnosis.  Please bring these questions and concerns to your appointment where your physician can answer.  Please relay more pressing concerns to our office, either via 2canhart, or by phone; if not able to reach us please visit nearby Urgent Care Center or Emergency Department.  If any emergent medical needs, please seek emergent medical help and/or call 911.    Please note that we are not able to fill out paperwork that might be related to your work, utility  company, disability, and/or driving, among others, in between the visits.  Please schedule a dedicated appointment to address your paperwork, so we can do that in a timely manner.  This is not due to lack of concern or interest for your disease-related work/administrative problems, but to make sure that we provide the best possible care and to fill out your paperwork in a correct and timely manner.    Thank you for entrusting your neurological care to [facility] and we look forward to continuing to serve you.

## 2024-03-25 ENCOUNTER — APPOINTMENT (RX ONLY)
Dept: URBAN - METROPOLITAN AREA CLINIC 6 | Facility: CLINIC | Age: 34
Setting detail: DERMATOLOGY
End: 2024-03-25

## 2024-03-25 DIAGNOSIS — L0292 CARBUNCLE AND FURUNCLE OF UNSPECIFIED SITE: ICD-10-CM

## 2024-03-25 DIAGNOSIS — L0293 CARBUNCLE AND FURUNCLE OF UNSPECIFIED SITE: ICD-10-CM

## 2024-03-25 PROBLEM — L08.9 LOCAL INFECTION OF THE SKIN AND SUBCUTANEOUS TISSUE, UNSPECIFIED: Status: ACTIVE | Noted: 2024-03-25

## 2024-03-25 PROCEDURE — ? COUNSELING

## 2024-03-25 PROCEDURE — 11900 INJECT SKIN LESIONS </W 7: CPT

## 2024-03-25 PROCEDURE — ? INTRALESIONAL KENALOG

## 2024-03-25 ASSESSMENT — LOCATION DETAILED DESCRIPTION DERM: LOCATION DETAILED: LEFT ANTERIOR PROXIMAL THIGH

## 2024-03-25 ASSESSMENT — LOCATION SIMPLE DESCRIPTION DERM: LOCATION SIMPLE: LEFT THIGH

## 2024-03-25 ASSESSMENT — LOCATION ZONE DERM: LOCATION ZONE: LEG

## 2024-03-25 NOTE — PROCEDURE: INTRALESIONAL KENALOG
Kenalog Preparation: Kenalog
Concentration Of Kenalog Solution Injected (Mg/Ml): 10.0
Medical Necessity Clause: This procedure was medically necessary because the lesions that were treated were:
Total Volume (Ccs): 0.3
Administered By (Optional): Dr. Olman Eugene
How Many Mls Were Removed From The 80 Mg/Ml (5ml) Vial When Preparing The Injectable Solution?: 0
Expiration Date For Kenalog (Optional): 12/2025
Require Ndc Code?: No
Kenalog Type Of Vial: Multiple Dose
Validate Note Data When Using Inventory: Yes
Treatment Number (Optional): 1
Ndc# For Kenalog Only: 9757-8109-33
Detail Level: Detailed
Lot # For Kenalog (Optional): [id number]
Consent: The risks of atrophy were reviewed with the patient.

## 2024-04-11 ENCOUNTER — HOSPITAL ENCOUNTER (OUTPATIENT)
Dept: LAB | Facility: MEDICAL CENTER | Age: 34
End: 2024-04-11
Attending: STUDENT IN AN ORGANIZED HEALTH CARE EDUCATION/TRAINING PROGRAM
Payer: COMMERCIAL

## 2024-04-11 PROCEDURE — 80177 DRUG SCRN QUAN LEVETIRACETAM: CPT

## 2024-04-11 PROCEDURE — 36415 COLL VENOUS BLD VENIPUNCTURE: CPT

## 2024-04-13 LAB — LEVETIRACETAM SERPL-MCNC: 24 UG/ML (ref 10–40)

## 2024-05-10 ENCOUNTER — HOSPITAL ENCOUNTER (OUTPATIENT)
Dept: LAB | Facility: MEDICAL CENTER | Age: 34
End: 2024-05-10
Attending: STUDENT IN AN ORGANIZED HEALTH CARE EDUCATION/TRAINING PROGRAM
Payer: COMMERCIAL

## 2024-05-10 DIAGNOSIS — G40.109 LOCALIZATION-RELATED EPILEPSY (HCC): ICD-10-CM

## 2024-05-12 LAB — LEVETIRACETAM SERPL-MCNC: 33 UG/ML (ref 10–40)

## 2024-06-13 ENCOUNTER — HOSPITAL ENCOUNTER (OUTPATIENT)
Dept: LAB | Facility: MEDICAL CENTER | Age: 34
End: 2024-06-13
Attending: STUDENT IN AN ORGANIZED HEALTH CARE EDUCATION/TRAINING PROGRAM
Payer: COMMERCIAL

## 2024-06-13 DIAGNOSIS — G40.109 LOCALIZATION-RELATED EPILEPSY (HCC): ICD-10-CM

## 2024-06-13 PROCEDURE — 80177 DRUG SCRN QUAN LEVETIRACETAM: CPT

## 2024-06-13 PROCEDURE — 36415 COLL VENOUS BLD VENIPUNCTURE: CPT

## 2024-06-15 LAB — LEVETIRACETAM SERPL-MCNC: 15 UG/ML (ref 10–40)

## 2024-06-22 ENCOUNTER — OFFICE VISIT (OUTPATIENT)
Dept: URGENT CARE | Facility: PHYSICIAN GROUP | Age: 34
End: 2024-06-22
Payer: COMMERCIAL

## 2024-06-22 VITALS
HEART RATE: 85 BPM | HEIGHT: 66 IN | DIASTOLIC BLOOD PRESSURE: 84 MMHG | WEIGHT: 158.3 LBS | BODY MASS INDEX: 25.44 KG/M2 | RESPIRATION RATE: 16 BRPM | SYSTOLIC BLOOD PRESSURE: 124 MMHG | OXYGEN SATURATION: 97 % | TEMPERATURE: 97.9 F

## 2024-06-22 DIAGNOSIS — J01.90 ACUTE NON-RECURRENT SINUSITIS, UNSPECIFIED LOCATION: ICD-10-CM

## 2024-06-22 PROCEDURE — 3074F SYST BP LT 130 MM HG: CPT | Performed by: NURSE PRACTITIONER

## 2024-06-22 PROCEDURE — 3079F DIAST BP 80-89 MM HG: CPT | Performed by: NURSE PRACTITIONER

## 2024-06-22 PROCEDURE — 99213 OFFICE O/P EST LOW 20 MIN: CPT | Performed by: NURSE PRACTITIONER

## 2024-06-22 RX ORDER — CEFDINIR 300 MG/1
300 CAPSULE ORAL 2 TIMES DAILY
Qty: 14 CAPSULE | Refills: 0 | Status: SHIPPED | OUTPATIENT
Start: 2024-06-22 | End: 2024-06-29

## 2024-06-22 ASSESSMENT — FIBROSIS 4 INDEX: FIB4 SCORE: 0.89

## 2024-06-22 ASSESSMENT — ENCOUNTER SYMPTOMS
RESPIRATORY NEGATIVE: 1
SINUS PAIN: 1
FEVER: 0
SINUS PRESSURE: 1

## 2024-06-22 ASSESSMENT — VISUAL ACUITY: OU: 1

## 2024-06-22 NOTE — PROGRESS NOTES
Subjective:     Celestine Calderón is a 33 y.o. female who presents for Nasal Congestion (Cough, ears feels tender, sinus pressure/pain x 9 days )       Sinusitis  This is a new problem. Episode onset: 9 days. The problem has been gradually worsening since onset. Associated symptoms include congestion, ear pain and sinus pressure. Treatments tried: Flonase, antihistamine.     25 weeks pregnant.    Review of Systems   Constitutional:  Positive for malaise/fatigue. Negative for fever.   HENT:  Positive for congestion, ear pain, sinus pressure and sinus pain.    Respiratory: Negative.     Endo/Heme/Allergies:  Negative for environmental allergies.   All other systems reviewed and are negative.    Refer to HPI for additional details.    During this visit, appropriate PPE was worn, and hand hygiene was performed.    PMH:  has a past medical history of Epilepsy (HCC) and Viral encephalitis.    MEDS:   Current Outpatient Medications:     cefdinir (OMNICEF) 300 MG Cap, Take 1 Capsule by mouth 2 times a day for 7 days., Disp: 14 Capsule, Rfl: 0    levetiracetam (KEPPRA) 750 MG tablet, Take 2 Tablets by mouth 2 times a day for 180 days., Disp: 360 Tablet, Rfl: 1    ALLERGIES:   Allergies   Allergen Reactions    Penicillins Hives     Family member states reaction occurred in childhood.    Penicillin G      SURGHX:   Past Surgical History:   Procedure Laterality Date    TONSILLECTOMY  7/9/08    Performed by NATHAN ROBERTSON at SURGERY SAME DAY [facility] ORS    ETHMOIDECTOMY  7/9/08    Performed by NATHAN ROBERTSON at SURGERY SAME DAY [facility] ORS    ANTROSTOMY  7/9/08    Performed by NATHAN ROBERTSON at SURGERY SAME DAY [facility] ORS    TURBINOPLASTY  7/9/08    Performed by NATHAN ROBERTSON at SURGERY SAME DAY [facility] ORS    OTHER SURGICAL PROCEDURE      benign lymph node removed, right groin, 2005     SOCHX:  reports that she has never smoked. She has never used smokeless tobacco. She reports that she does not  "currently use alcohol. She reports that she does not use drugs.    FH: Per HPI as applicable/pertinent.      Objective:     /84   Pulse 85   Temp 36.6 °C (97.9 °F) (Temporal)   Resp 16   Ht 1.676 m (5' 6\")   Wt 71.8 kg (158 lb 4.8 oz) Comment: took weight in clinic  LMP 12/05/2023 (Exact Date)   SpO2 97%   BMI 25.55 kg/m²     Physical Exam  Nursing note reviewed.   Constitutional:       General: She is not in acute distress.     Appearance: She is well-developed. She is not ill-appearing or toxic-appearing.   HENT:      Right Ear: Tympanic membrane is not perforated, erythematous or bulging.      Left Ear: Tympanic membrane is bulging (Dull). Tympanic membrane is not perforated or erythematous.      Nose: Congestion present.      Right Sinus: Maxillary sinus tenderness and frontal sinus tenderness present.      Left Sinus: Maxillary sinus tenderness and frontal sinus tenderness present.      Mouth/Throat:      Mouth: Mucous membranes are moist.      Pharynx: Posterior oropharyngeal erythema present. No pharyngeal swelling or oropharyngeal exudate.      Comments: PND  Eyes:      General: Vision grossly intact.   Neck:      Trachea: Phonation normal.   Cardiovascular:      Rate and Rhythm: Normal rate.   Pulmonary:      Effort: Pulmonary effort is normal. No respiratory distress.   Musculoskeletal:         General: No deformity. Normal range of motion.      Cervical back: Neck supple.   Skin:     General: Skin is warm and dry.      Coloration: Skin is not pale.   Neurological:      Mental Status: She is alert and oriented to person, place, and time.      Motor: No weakness.   Psychiatric:         Behavior: Behavior normal. Behavior is cooperative.       Assessment/Plan:     1. Acute non-recurrent sinusitis, unspecified location  - cefdinir (OMNICEF) 300 MG Cap; Take 1 Capsule by mouth 2 times a day for 7 days.  Dispense: 14 Capsule; Refill: 0    Rx as above sent electronically.  Continue Flonase and " 24-hour antihistamine such as Claritin or Zyrtec.  May use Tylenol as needed for pain.    Monitor. Follow up in 5 days if symptoms do not improve or sooner if symptoms change or worsen.    Differential diagnosis, natural history, supportive care, over-the-counter symptom management per 's instructions, close monitoring, and indications for immediate follow-up discussed.     All questions answered. Patient agrees with the plan of care.    Discharge summary provided via Sciencet.

## 2024-07-10 ENCOUNTER — HOSPITAL ENCOUNTER (OUTPATIENT)
Dept: LAB | Facility: MEDICAL CENTER | Age: 34
End: 2024-07-10
Attending: STUDENT IN AN ORGANIZED HEALTH CARE EDUCATION/TRAINING PROGRAM
Payer: COMMERCIAL

## 2024-07-10 DIAGNOSIS — G40.109 LOCALIZATION-RELATED EPILEPSY (HCC): ICD-10-CM

## 2024-07-10 PROCEDURE — 36415 COLL VENOUS BLD VENIPUNCTURE: CPT

## 2024-07-10 PROCEDURE — 80177 DRUG SCRN QUAN LEVETIRACETAM: CPT

## 2024-07-12 LAB — LEVETIRACETAM SERPL-MCNC: 28 UG/ML (ref 10–40)

## 2024-07-16 ENCOUNTER — OFFICE VISIT (OUTPATIENT)
Dept: NEUROLOGY | Facility: MEDICAL CENTER | Age: 34
End: 2024-07-16
Attending: STUDENT IN AN ORGANIZED HEALTH CARE EDUCATION/TRAINING PROGRAM
Payer: COMMERCIAL

## 2024-07-16 VITALS
OXYGEN SATURATION: 96 % | HEART RATE: 80 BPM | SYSTOLIC BLOOD PRESSURE: 122 MMHG | HEIGHT: 64 IN | BODY MASS INDEX: 27.18 KG/M2 | WEIGHT: 159.2 LBS | TEMPERATURE: 98.1 F | DIASTOLIC BLOOD PRESSURE: 60 MMHG

## 2024-07-16 DIAGNOSIS — G40.109 LOCALIZATION-RELATED EPILEPSY (HCC): ICD-10-CM

## 2024-07-16 DIAGNOSIS — Z3A.28 28 WEEKS GESTATION OF PREGNANCY: ICD-10-CM

## 2024-07-16 PROCEDURE — 99214 OFFICE O/P EST MOD 30 MIN: CPT | Performed by: STUDENT IN AN ORGANIZED HEALTH CARE EDUCATION/TRAINING PROGRAM

## 2024-07-16 PROCEDURE — 3074F SYST BP LT 130 MM HG: CPT | Performed by: STUDENT IN AN ORGANIZED HEALTH CARE EDUCATION/TRAINING PROGRAM

## 2024-07-16 PROCEDURE — 99211 OFF/OP EST MAY X REQ PHY/QHP: CPT | Performed by: STUDENT IN AN ORGANIZED HEALTH CARE EDUCATION/TRAINING PROGRAM

## 2024-07-16 PROCEDURE — 3078F DIAST BP <80 MM HG: CPT | Performed by: STUDENT IN AN ORGANIZED HEALTH CARE EDUCATION/TRAINING PROGRAM

## 2024-07-16 RX ORDER — LEVETIRACETAM 750 MG/1
1500 TABLET ORAL 2 TIMES DAILY
Qty: 360 TABLET | Refills: 1 | Status: SHIPPED | OUTPATIENT
Start: 2024-07-16 | End: 2025-01-12

## 2024-07-16 ASSESSMENT — FIBROSIS 4 INDEX: FIB4 SCORE: 0.89

## 2024-08-14 ENCOUNTER — HOSPITAL ENCOUNTER (OUTPATIENT)
Dept: LAB | Facility: MEDICAL CENTER | Age: 34
End: 2024-08-14
Attending: STUDENT IN AN ORGANIZED HEALTH CARE EDUCATION/TRAINING PROGRAM
Payer: COMMERCIAL

## 2024-08-14 DIAGNOSIS — G40.109 LOCALIZATION-RELATED EPILEPSY (HCC): ICD-10-CM

## 2024-08-14 PROCEDURE — 36415 COLL VENOUS BLD VENIPUNCTURE: CPT

## 2024-08-14 PROCEDURE — 80177 DRUG SCRN QUAN LEVETIRACETAM: CPT

## 2024-08-16 LAB — LEVETIRACETAM SERPL-MCNC: 56 UG/ML (ref 10–40)

## 2024-09-13 ENCOUNTER — HOSPITAL ENCOUNTER (OUTPATIENT)
Dept: LAB | Facility: MEDICAL CENTER | Age: 34
End: 2024-09-13
Attending: STUDENT IN AN ORGANIZED HEALTH CARE EDUCATION/TRAINING PROGRAM
Payer: COMMERCIAL

## 2024-09-13 DIAGNOSIS — G40.109 LOCALIZATION-RELATED EPILEPSY (HCC): ICD-10-CM

## 2024-09-13 PROCEDURE — 80177 DRUG SCRN QUAN LEVETIRACETAM: CPT

## 2024-09-13 PROCEDURE — 36415 COLL VENOUS BLD VENIPUNCTURE: CPT

## 2024-09-16 LAB — LEVETIRACETAM SERPL-MCNC: 18 UG/ML (ref 10–40)

## 2024-09-28 ENCOUNTER — HOSPITAL ENCOUNTER (EMERGENCY)
Facility: MEDICAL CENTER | Age: 34
End: 2024-09-29
Attending: OBSTETRICS & GYNECOLOGY | Admitting: OBSTETRICS & GYNECOLOGY
Payer: COMMERCIAL

## 2024-09-28 PROCEDURE — 302449 STATCHG TRIAGE ONLY (STATISTIC)

## 2024-09-28 ASSESSMENT — FIBROSIS 4 INDEX: FIB4 SCORE: 0.92

## 2024-09-29 VITALS
HEIGHT: 64 IN | DIASTOLIC BLOOD PRESSURE: 74 MMHG | OXYGEN SATURATION: 97 % | TEMPERATURE: 97.2 F | BODY MASS INDEX: 28 KG/M2 | RESPIRATION RATE: 16 BRPM | SYSTOLIC BLOOD PRESSURE: 119 MMHG | WEIGHT: 164 LBS | HEART RATE: 83 BPM

## 2024-09-29 PROCEDURE — 59025 FETAL NON-STRESS TEST: CPT

## 2024-09-29 NOTE — PROGRESS NOTES
EDC -10/7/24 EGA -    2325 - Pt arrived to labor and delivery for ctx. Pt placed in room LDA 4.   2328- External monitors in place X2. Pt reports good FM. No complaints of ROM or vaginal bleeding. FOB at bedside. POC discussed with pt and family members, all questions answered.    2330- Report to Dr. Hawk. Orders to recheck SVE.   0130- No change in SVE. Updated given to Dr. Hawk Via phone. Orders for D/C.   0155- Discharge instructions given to pt. Pt verbalized understanding.   0159- Pt ambulated off unit in a stable condition.

## 2024-09-29 NOTE — PROCEDURES
OBSTETRICS AND GYNECOLOGY  Fetal Non-Stress Test Procedure Note    Date: 2024, 00:30a    Baseline: 120bpm  Variability: moderate  Accelerations: present  Decelerations: absent    [place]: CTX 3-6    Impression: Reactive NST for gestational age.    34 y.o.  with IUP at 38w6d presents to the OB ED for evaluation for contractions.  The patient was found to be 1/60/-3 over 2 exams 2 hours apart.  The patient was having contractions but was still able to breathe through them.  The patient was comfortable with discharge home.  Term/FM precautions were given to the patient.      Tor Hawk MD, MS, FACOG   2024, 3:08 AM     [facility]   [phone]

## 2024-10-02 ENCOUNTER — HOSPITAL ENCOUNTER (INPATIENT)
Facility: MEDICAL CENTER | Age: 34
LOS: 1 days | End: 2024-10-03
Attending: OBSTETRICS & GYNECOLOGY | Admitting: OBSTETRICS & GYNECOLOGY
Payer: COMMERCIAL

## 2024-10-02 ENCOUNTER — ANESTHESIA (OUTPATIENT)
Dept: ANESTHESIOLOGY | Facility: MEDICAL CENTER | Age: 34
End: 2024-10-02
Payer: COMMERCIAL

## 2024-10-02 ENCOUNTER — APPOINTMENT (OUTPATIENT)
Dept: OBGYN | Facility: MEDICAL CENTER | Age: 34
End: 2024-10-02
Attending: OBSTETRICS & GYNECOLOGY
Payer: COMMERCIAL

## 2024-10-02 ENCOUNTER — ANESTHESIA EVENT (OUTPATIENT)
Dept: ANESTHESIOLOGY | Facility: MEDICAL CENTER | Age: 34
End: 2024-10-02
Payer: COMMERCIAL

## 2024-10-02 LAB
BASOPHILS # BLD AUTO: 0.7 % (ref 0–1.8)
BASOPHILS # BLD: 0.05 K/UL (ref 0–0.12)
EOSINOPHIL # BLD AUTO: 0.1 K/UL (ref 0–0.51)
EOSINOPHIL NFR BLD: 1.3 % (ref 0–6.9)
ERYTHROCYTE [DISTWIDTH] IN BLOOD BY AUTOMATED COUNT: 42.8 FL (ref 35.9–50)
HCT VFR BLD AUTO: 40 % (ref 37–47)
HGB BLD-MCNC: 14.3 G/DL (ref 12–16)
HOLDING TUBE BB 8507: NORMAL
IMM GRANULOCYTES # BLD AUTO: 0.05 K/UL (ref 0–0.11)
IMM GRANULOCYTES NFR BLD AUTO: 0.7 % (ref 0–0.9)
LYMPHOCYTES # BLD AUTO: 2.05 K/UL (ref 1–4.8)
LYMPHOCYTES NFR BLD: 26.9 % (ref 22–41)
MCH RBC QN AUTO: 32.4 PG (ref 27–33)
MCHC RBC AUTO-ENTMCNC: 35.8 G/DL (ref 32.2–35.5)
MCV RBC AUTO: 90.7 FL (ref 81.4–97.8)
MONOCYTES # BLD AUTO: 0.48 K/UL (ref 0–0.85)
MONOCYTES NFR BLD AUTO: 6.3 % (ref 0–13.4)
NEUTROPHILS # BLD AUTO: 4.88 K/UL (ref 1.82–7.42)
NEUTROPHILS NFR BLD: 64.1 % (ref 44–72)
NRBC # BLD AUTO: 0 K/UL
NRBC BLD-RTO: 0 /100 WBC (ref 0–0.2)
PLATELET # BLD AUTO: 132 K/UL (ref 164–446)
PMV BLD AUTO: 11.4 FL (ref 9–12.9)
RBC # BLD AUTO: 4.41 M/UL (ref 4.2–5.4)
T PALLIDUM AB SER QL IA: NONREACTIVE
WBC # BLD AUTO: 7.6 K/UL (ref 4.8–10.8)

## 2024-10-02 PROCEDURE — 10907ZC DRAINAGE OF AMNIOTIC FLUID, THERAPEUTIC FROM PRODUCTS OF CONCEPTION, VIA NATURAL OR ARTIFICIAL OPENING: ICD-10-PCS | Performed by: OBSTETRICS & GYNECOLOGY

## 2024-10-02 PROCEDURE — 3E033VJ INTRODUCTION OF OTHER HORMONE INTO PERIPHERAL VEIN, PERCUTANEOUS APPROACH: ICD-10-PCS | Performed by: OBSTETRICS & GYNECOLOGY

## 2024-10-02 PROCEDURE — 700111 HCHG RX REV CODE 636 W/ 250 OVERRIDE (IP): Performed by: STUDENT IN AN ORGANIZED HEALTH CARE EDUCATION/TRAINING PROGRAM

## 2024-10-02 PROCEDURE — 0UQMXZZ REPAIR VULVA, EXTERNAL APPROACH: ICD-10-PCS | Performed by: OBSTETRICS & GYNECOLOGY

## 2024-10-02 PROCEDURE — 36415 COLL VENOUS BLD VENIPUNCTURE: CPT

## 2024-10-02 PROCEDURE — 85461 HEMOGLOBIN FETAL: CPT

## 2024-10-02 PROCEDURE — 770002 HCHG ROOM/CARE - OB PRIVATE (112)

## 2024-10-02 PROCEDURE — 700111 HCHG RX REV CODE 636 W/ 250 OVERRIDE (IP): Mod: JZ | Performed by: OBSTETRICS & GYNECOLOGY

## 2024-10-02 PROCEDURE — 86780 TREPONEMA PALLIDUM: CPT

## 2024-10-02 PROCEDURE — A9270 NON-COVERED ITEM OR SERVICE: HCPCS | Performed by: OBSTETRICS & GYNECOLOGY

## 2024-10-02 PROCEDURE — 700101 HCHG RX REV CODE 250: Performed by: STUDENT IN AN ORGANIZED HEALTH CARE EDUCATION/TRAINING PROGRAM

## 2024-10-02 PROCEDURE — 700102 HCHG RX REV CODE 250 W/ 637 OVERRIDE(OP): Performed by: OBSTETRICS & GYNECOLOGY

## 2024-10-02 PROCEDURE — 303615 HCHG EPIDURAL/SPINAL ANESTHESIA FOR LABOR

## 2024-10-02 PROCEDURE — 59409 OBSTETRICAL CARE: CPT

## 2024-10-02 PROCEDURE — 85025 COMPLETE CBC W/AUTO DIFF WBC: CPT

## 2024-10-02 PROCEDURE — 700105 HCHG RX REV CODE 258: Performed by: OBSTETRICS & GYNECOLOGY

## 2024-10-02 PROCEDURE — 304965 HCHG RECOVERY SERVICES

## 2024-10-02 RX ORDER — LEVETIRACETAM 500 MG/1
1500 TABLET ORAL 2 TIMES DAILY
Status: DISCONTINUED | OUTPATIENT
Start: 2024-10-02 | End: 2024-10-03 | Stop reason: HOSPADM

## 2024-10-02 RX ORDER — SODIUM CHLORIDE, SODIUM LACTATE, POTASSIUM CHLORIDE, CALCIUM CHLORIDE 600; 310; 30; 20 MG/100ML; MG/100ML; MG/100ML; MG/100ML
INJECTION, SOLUTION INTRAVENOUS PRN
Status: DISCONTINUED | OUTPATIENT
Start: 2024-10-02 | End: 2024-10-03 | Stop reason: HOSPADM

## 2024-10-02 RX ORDER — SODIUM CHLORIDE, SODIUM LACTATE, POTASSIUM CHLORIDE, CALCIUM CHLORIDE 600; 310; 30; 20 MG/100ML; MG/100ML; MG/100ML; MG/100ML
1000 INJECTION, SOLUTION INTRAVENOUS CONTINUOUS
Status: ACTIVE | OUTPATIENT
Start: 2024-10-02 | End: 2024-10-02

## 2024-10-02 RX ORDER — DOCUSATE SODIUM 100 MG/1
100 CAPSULE, LIQUID FILLED ORAL 2 TIMES DAILY PRN
Status: DISCONTINUED | OUTPATIENT
Start: 2024-10-02 | End: 2024-10-03 | Stop reason: HOSPADM

## 2024-10-02 RX ORDER — BISACODYL 10 MG
10 SUPPOSITORY, RECTAL RECTAL PRN
Status: DISCONTINUED | OUTPATIENT
Start: 2024-10-02 | End: 2024-10-03 | Stop reason: HOSPADM

## 2024-10-02 RX ORDER — SODIUM CHLORIDE, SODIUM LACTATE, POTASSIUM CHLORIDE, AND CALCIUM CHLORIDE .6; .31; .03; .02 G/100ML; G/100ML; G/100ML; G/100ML
1000 INJECTION, SOLUTION INTRAVENOUS
Status: DISCONTINUED | OUTPATIENT
Start: 2024-10-02 | End: 2024-10-02 | Stop reason: HOSPADM

## 2024-10-02 RX ORDER — IBUPROFEN 800 MG/1
800 TABLET, FILM COATED ORAL
Status: DISCONTINUED | OUTPATIENT
Start: 2024-10-02 | End: 2024-10-02 | Stop reason: HOSPADM

## 2024-10-02 RX ORDER — ONDANSETRON 4 MG/1
4 TABLET, ORALLY DISINTEGRATING ORAL EVERY 6 HOURS PRN
Status: DISCONTINUED | OUTPATIENT
Start: 2024-10-02 | End: 2024-10-02 | Stop reason: HOSPADM

## 2024-10-02 RX ORDER — OXYTOCIN 10 [USP'U]/ML
10 INJECTION, SOLUTION INTRAMUSCULAR; INTRAVENOUS
Status: DISCONTINUED | OUTPATIENT
Start: 2024-10-02 | End: 2024-10-02 | Stop reason: HOSPADM

## 2024-10-02 RX ORDER — VITAMIN A ACETATE, BETA CAROTENE, ASCORBIC ACID, CHOLECALCIFEROL, .ALPHA.-TOCOPHEROL ACETATE, DL-, THIAMINE MONONITRATE, RIBOFLAVIN, NIACINAMIDE, PYRIDOXINE HYDROCHLORIDE, FOLIC ACID, CYANOCOBALAMIN, CALCIUM CARBONATE, FERROUS FUMARATE, ZINC OXIDE, CUPRIC OXIDE 3080; 12; 120; 400; 1; 1.84; 3; 20; 22; 920; 25; 200; 27; 10; 2 [IU]/1; UG/1; MG/1; [IU]/1; MG/1; MG/1; MG/1; MG/1; MG/1; [IU]/1; MG/1; MG/1; MG/1; MG/1; MG/1
1 TABLET, FILM COATED ORAL EVERY MORNING
Status: DISCONTINUED | OUTPATIENT
Start: 2024-10-03 | End: 2024-10-03 | Stop reason: HOSPADM

## 2024-10-02 RX ORDER — TERBUTALINE SULFATE 1 MG/ML
0.25 INJECTION, SOLUTION SUBCUTANEOUS
Status: DISCONTINUED | OUTPATIENT
Start: 2024-10-02 | End: 2024-10-02 | Stop reason: HOSPADM

## 2024-10-02 RX ORDER — LIDOCAINE HYDROCHLORIDE 10 MG/ML
20 INJECTION, SOLUTION INFILTRATION; PERINEURAL
Status: DISCONTINUED | OUTPATIENT
Start: 2024-10-02 | End: 2024-10-02 | Stop reason: HOSPADM

## 2024-10-02 RX ORDER — ACETAMINOPHEN 500 MG
1000 TABLET ORAL
Status: DISCONTINUED | OUTPATIENT
Start: 2024-10-02 | End: 2024-10-02 | Stop reason: HOSPADM

## 2024-10-02 RX ORDER — ONDANSETRON 2 MG/ML
4 INJECTION INTRAMUSCULAR; INTRAVENOUS EVERY 6 HOURS PRN
Status: DISCONTINUED | OUTPATIENT
Start: 2024-10-02 | End: 2024-10-02 | Stop reason: HOSPADM

## 2024-10-02 RX ORDER — DEXTROSE, SODIUM CHLORIDE, SODIUM LACTATE, POTASSIUM CHLORIDE, AND CALCIUM CHLORIDE 5; .6; .31; .03; .02 G/100ML; G/100ML; G/100ML; G/100ML; G/100ML
INJECTION, SOLUTION INTRAVENOUS CONTINUOUS
Status: DISCONTINUED | OUTPATIENT
Start: 2024-10-02 | End: 2024-10-03 | Stop reason: HOSPADM

## 2024-10-02 RX ORDER — OXYCODONE HYDROCHLORIDE 5 MG/1
5 TABLET ORAL EVERY 4 HOURS PRN
Status: DISCONTINUED | OUTPATIENT
Start: 2024-10-02 | End: 2024-10-03 | Stop reason: HOSPADM

## 2024-10-02 RX ORDER — MISOPROSTOL 200 UG/1
600 TABLET ORAL
Status: DISCONTINUED | OUTPATIENT
Start: 2024-10-02 | End: 2024-10-03 | Stop reason: HOSPADM

## 2024-10-02 RX ORDER — ROPIVACAINE HYDROCHLORIDE 2 MG/ML
INJECTION, SOLUTION EPIDURAL; INFILTRATION; PERINEURAL CONTINUOUS
Status: DISCONTINUED | OUTPATIENT
Start: 2024-10-02 | End: 2024-10-02

## 2024-10-02 RX ORDER — CARBOPROST TROMETHAMINE 250 UG/ML
250 INJECTION, SOLUTION INTRAMUSCULAR
Status: DISCONTINUED | OUTPATIENT
Start: 2024-10-02 | End: 2024-10-03 | Stop reason: HOSPADM

## 2024-10-02 RX ORDER — LEVETIRACETAM 500 MG/1
750 TABLET ORAL 2 TIMES DAILY
Status: DISCONTINUED | OUTPATIENT
Start: 2024-10-02 | End: 2024-10-02

## 2024-10-02 RX ORDER — ACETAMINOPHEN 500 MG
1000 TABLET ORAL EVERY 6 HOURS PRN
Status: DISCONTINUED | OUTPATIENT
Start: 2024-10-02 | End: 2024-10-03 | Stop reason: HOSPADM

## 2024-10-02 RX ORDER — IBUPROFEN 800 MG/1
800 TABLET, FILM COATED ORAL EVERY 8 HOURS PRN
Status: DISCONTINUED | OUTPATIENT
Start: 2024-10-02 | End: 2024-10-03 | Stop reason: HOSPADM

## 2024-10-02 RX ORDER — SODIUM CHLORIDE, SODIUM LACTATE, POTASSIUM CHLORIDE, AND CALCIUM CHLORIDE .6; .31; .03; .02 G/100ML; G/100ML; G/100ML; G/100ML
250 INJECTION, SOLUTION INTRAVENOUS PRN
Status: DISCONTINUED | OUTPATIENT
Start: 2024-10-02 | End: 2024-10-02 | Stop reason: HOSPADM

## 2024-10-02 RX ORDER — EPHEDRINE SULFATE 50 MG/ML
5 INJECTION, SOLUTION INTRAVENOUS
Status: DISCONTINUED | OUTPATIENT
Start: 2024-10-02 | End: 2024-10-02 | Stop reason: HOSPADM

## 2024-10-02 RX ORDER — METHYLERGONOVINE MALEATE 0.2 MG/ML
0.2 INJECTION INTRAVENOUS
Status: DISCONTINUED | OUTPATIENT
Start: 2024-10-02 | End: 2024-10-03 | Stop reason: HOSPADM

## 2024-10-02 RX ADMIN — ACETAMINOPHEN 1000 MG: 500 TABLET ORAL at 18:29

## 2024-10-02 RX ADMIN — OXYTOCIN 2 MILLI-UNITS/MIN: 10 INJECTION, SOLUTION INTRAMUSCULAR; INTRAVENOUS at 06:32

## 2024-10-02 RX ADMIN — LEVETIRACETAM 1500 MG: 500 TABLET, FILM COATED ORAL at 17:42

## 2024-10-02 RX ADMIN — SODIUM CHLORIDE, POTASSIUM CHLORIDE, SODIUM LACTATE AND CALCIUM CHLORIDE 1000 ML: 600; 310; 30; 20 INJECTION, SOLUTION INTRAVENOUS at 06:30

## 2024-10-02 RX ADMIN — IBUPROFEN 800 MG: 800 TABLET, FILM COATED ORAL at 23:45

## 2024-10-02 RX ADMIN — OXYTOCIN 20 UNITS: 10 INJECTION, SOLUTION INTRAMUSCULAR; INTRAVENOUS at 14:04

## 2024-10-02 RX ADMIN — LEVETIRACETAM 1500 MG: 500 TABLET, FILM COATED ORAL at 08:44

## 2024-10-02 RX ADMIN — OXYTOCIN 125 ML/HR: 10 INJECTION, SOLUTION INTRAMUSCULAR; INTRAVENOUS at 14:48

## 2024-10-02 RX ADMIN — LIDOCAINE HYDROCHLORIDE,EPINEPHRINE BITARTRATE 5 ML: 15; .005 INJECTION, SOLUTION EPIDURAL; INFILTRATION; INTRACAUDAL; PERINEURAL at 11:55

## 2024-10-02 RX ADMIN — ROPIVACAINE HYDROCHLORIDE: 2 INJECTION EPIDURAL; INFILTRATION; PERINEURAL at 12:07

## 2024-10-02 ASSESSMENT — PATIENT HEALTH QUESTIONNAIRE - PHQ9
SUM OF ALL RESPONSES TO PHQ9 QUESTIONS 1 AND 2: 0
1. LITTLE INTEREST OR PLEASURE IN DOING THINGS: NOT AT ALL
2. FEELING DOWN, DEPRESSED, IRRITABLE, OR HOPELESS: NOT AT ALL

## 2024-10-02 ASSESSMENT — LIFESTYLE VARIABLES
HAVE PEOPLE ANNOYED YOU BY CRITICIZING YOUR DRINKING: NO
TOTAL SCORE: 0
HAVE YOU EVER FELT YOU SHOULD CUT DOWN ON YOUR DRINKING: NO
DOES PATIENT WANT TO STOP DRINKING: NO
ALCOHOL_USE: NO
TOTAL SCORE: 0
CONSUMPTION TOTAL: INCOMPLETE
EVER HAD A DRINK FIRST THING IN THE MORNING TO STEADY YOUR NERVES TO GET RID OF A HANGOVER: NO
TOTAL SCORE: 0
EVER FELT BAD OR GUILTY ABOUT YOUR DRINKING: NO

## 2024-10-02 ASSESSMENT — PAIN DESCRIPTION - PAIN TYPE
TYPE: ACUTE PAIN

## 2024-10-02 ASSESSMENT — SOCIAL DETERMINANTS OF HEALTH (SDOH)
WITHIN THE LAST YEAR, HAVE YOU BEEN KICKED, HIT, SLAPPED, OR OTHERWISE PHYSICALLY HURT BY YOUR PARTNER OR EX-PARTNER?: NO
WITHIN THE PAST 12 MONTHS, THE FOOD YOU BOUGHT JUST DIDN'T LAST AND YOU DIDN'T HAVE MONEY TO GET MORE: NEVER TRUE
WITHIN THE LAST YEAR, HAVE YOU BEEN HUMILIATED OR EMOTIONALLY ABUSED IN OTHER WAYS BY YOUR PARTNER OR EX-PARTNER?: NO
IN THE PAST 12 MONTHS, HAS THE ELECTRIC, GAS, OIL, OR WATER COMPANY THREATENED TO SHUT OFF SERVICE IN YOUR HOME?: NO
WITHIN THE PAST 12 MONTHS, YOU WORRIED THAT YOUR FOOD WOULD RUN OUT BEFORE YOU GOT THE MONEY TO BUY MORE: NEVER TRUE
WITHIN THE LAST YEAR, HAVE TO BEEN RAPED OR FORCED TO HAVE ANY KIND OF SEXUAL ACTIVITY BY YOUR PARTNER OR EX-PARTNER?: NO
WITHIN THE LAST YEAR, HAVE YOU BEEN AFRAID OF YOUR PARTNER OR EX-PARTNER?: NO

## 2024-10-02 ASSESSMENT — PAIN SCALES - GENERAL: PAIN_LEVEL: 2

## 2024-10-02 ASSESSMENT — FIBROSIS 4 INDEX: FIB4 SCORE: 0.92

## 2024-10-03 ENCOUNTER — PHARMACY VISIT (OUTPATIENT)
Dept: PHARMACY | Facility: MEDICAL CENTER | Age: 34
End: 2024-10-03
Payer: COMMERCIAL

## 2024-10-03 VITALS
TEMPERATURE: 97 F | BODY MASS INDEX: 28.17 KG/M2 | HEART RATE: 66 BPM | DIASTOLIC BLOOD PRESSURE: 74 MMHG | OXYGEN SATURATION: 98 % | WEIGHT: 165 LBS | HEIGHT: 64 IN | SYSTOLIC BLOOD PRESSURE: 114 MMHG | RESPIRATION RATE: 17 BRPM

## 2024-10-03 LAB
ERYTHROCYTE [DISTWIDTH] IN BLOOD BY AUTOMATED COUNT: 44.5 FL (ref 35.9–50)
HCT VFR BLD AUTO: 36.4 % (ref 37–47)
HGB BLD-MCNC: 12.7 G/DL (ref 12–16)
IMMUNE ROSETTING TEST 8505FMH: NORMAL
MCH RBC QN AUTO: 32.3 PG (ref 27–33)
MCHC RBC AUTO-ENTMCNC: 34.9 G/DL (ref 32.2–35.5)
MCV RBC AUTO: 92.6 FL (ref 81.4–97.8)
NUMBER OF RH DOSES IND 8505RD: 1
PLATELET # BLD AUTO: 121 K/UL (ref 164–446)
PMV BLD AUTO: 11.7 FL (ref 9–12.9)
RBC # BLD AUTO: 3.93 M/UL (ref 4.2–5.4)
WBC # BLD AUTO: 10.7 K/UL (ref 4.8–10.8)

## 2024-10-03 PROCEDURE — 36415 COLL VENOUS BLD VENIPUNCTURE: CPT

## 2024-10-03 PROCEDURE — 85027 COMPLETE CBC AUTOMATED: CPT

## 2024-10-03 PROCEDURE — RXMED WILLOW AMBULATORY MEDICATION CHARGE: Performed by: OBSTETRICS & GYNECOLOGY

## 2024-10-03 PROCEDURE — 700102 HCHG RX REV CODE 250 W/ 637 OVERRIDE(OP): Performed by: OBSTETRICS & GYNECOLOGY

## 2024-10-03 PROCEDURE — 700112 HCHG RX REV CODE 229: Performed by: OBSTETRICS & GYNECOLOGY

## 2024-10-03 PROCEDURE — A9270 NON-COVERED ITEM OR SERVICE: HCPCS | Performed by: OBSTETRICS & GYNECOLOGY

## 2024-10-03 RX ORDER — IBUPROFEN 800 MG/1
800 TABLET, FILM COATED ORAL EVERY 8 HOURS PRN
Qty: 30 TABLET | Refills: 0 | Status: SHIPPED | OUTPATIENT
Start: 2024-10-03

## 2024-10-03 RX ADMIN — PRENATAL WITH FERROUS FUM AND FOLIC ACID 1 TABLET: 3080; 920; 120; 400; 22; 1.84; 3; 20; 10; 1; 12; 200; 27; 25; 2 TABLET ORAL at 06:01

## 2024-10-03 RX ADMIN — ACETAMINOPHEN 1000 MG: 500 TABLET ORAL at 03:26

## 2024-10-03 RX ADMIN — HUMAN RHO(D) IMMUNE GLOBULIN 300 MCG: 1500 SOLUTION INTRAMUSCULAR; INTRAVENOUS at 01:50

## 2024-10-03 RX ADMIN — IBUPROFEN 800 MG: 800 TABLET, FILM COATED ORAL at 07:49

## 2024-10-03 RX ADMIN — ACETAMINOPHEN 1000 MG: 500 TABLET ORAL at 10:08

## 2024-10-03 RX ADMIN — LEVETIRACETAM 1500 MG: 500 TABLET, FILM COATED ORAL at 06:01

## 2024-10-03 ASSESSMENT — PAIN DESCRIPTION - PAIN TYPE
TYPE: ACUTE PAIN

## 2024-10-03 ASSESSMENT — EDINBURGH POSTNATAL DEPRESSION SCALE (EPDS)
THE THOUGHT OF HARMING MYSELF HAS OCCURRED TO ME: NEVER
THINGS HAVE BEEN GETTING ON TOP OF ME: YES, SOMETIMES I HAVEN'T BEEN COPING AS WELL AS USUAL
I HAVE BEEN SO UNHAPPY THAT I HAVE HAD DIFFICULTY SLEEPING: NOT AT ALL
I HAVE FELT SAD OR MISERABLE: NO, NOT AT ALL
I HAVE LOOKED FORWARD WITH ENJOYMENT TO THINGS: AS MUCH AS I EVER DID
I HAVE BEEN SO UNHAPPY THAT I HAVE BEEN CRYING: ONLY OCCASIONALLY
I HAVE BLAMED MYSELF UNNECESSARILY WHEN THINGS WENT WRONG: YES, SOME OF THE TIME
I HAVE BEEN ANXIOUS OR WORRIED FOR NO GOOD REASON: YES, SOMETIMES
I HAVE BEEN ABLE TO LAUGH AND SEE THE FUNNY SIDE OF THINGS: AS MUCH AS I ALWAYS COULD
I HAVE FELT SCARED OR PANICKY FOR NO GOOD REASON: YES, SOMETIMES

## 2025-02-20 ENCOUNTER — PATIENT MESSAGE (OUTPATIENT)
Dept: NEUROLOGY | Facility: MEDICAL CENTER | Age: 35
End: 2025-02-20
Payer: COMMERCIAL

## 2025-03-22 NOTE — PROGRESS NOTES
V2.0    [facility] [place] Progress Note      Name:  Elroy Tyson /Age/Sex: 1947  (77 y.o. male)   MRN & CSN:  [id number] & [id number] Encounter Date/Time: 3/22/2025 11:26 AM EDT   Location:  [id number] PCP: Kellee Altamirano MD     Attending:Nolan Foster MD       Hospital Day: 6    Assessment and Recommendations   Elroy Tyson is a 77 y.o. male with pmh of HLD, HTN, CAD, end-stage renal disease on dialysis who presents with Hyperkalemia    Patient was examined this morning.    Very adamant about being discharged back to home today.  Nephrology consulted recommended that patient gets a dialysis session this morning.    Patient does have potassium of 5.2 continue to hold his losartan      Labs this morning sodium 139, potassium 5.2, creatinine 9.1 will get dialysis today, phosphorus is 11.1 will get dialysis today, WBC 11.0, H&H 10.7, 32.9 stable will continue to monitor        Plan:   End-stage renal disease on dialysis  Febrile temp HD placed on 3/16/2025 for dialysis, thrombectomy of fistula occlusion 3/19/2025  Avoid nephrotoxic agents, avoid hypotension  Following and monitoring kidney function  Nephrology on board for dialysis      2.  Hyperkalemia-improving  Most likely in setting of end-stage renal disease  Continuous cardiac monitor  Will monitor calcium and treatment accordingly      3.  Fistula occlusion  Patient status post left upper extremity arteriovenous fistula thrombectomy with fistulogram and angioplasty 3/20/2025    4.  Elevated liver enzymes- Improving  Imaging shows hepatic steatosis  Hepatitis panel negative  GI on board and following obtaining liver serology        Diet ADULT DIET; Regular; Less than 60 gm   DVT Prophylaxis [] Lovenox, []  Heparin, [] SCDs, [x] Ambulation,  [x] Eliquis, [] Xarelto  [] Coumadin   Code Status DNR-CCA   Disposition From: Home  Expected Disposition: Home  Estimated Date of Discharge: 1-2 days  Patient requires continued admission due to clinical  Keppra level ordered now and again in 2 weeks for new pregnancy due to potential for more rapid metabolism of the medication.

## 2025-04-11 ENCOUNTER — OFFICE VISIT (OUTPATIENT)
Dept: NEUROLOGY | Facility: MEDICAL CENTER | Age: 35
End: 2025-04-11
Attending: STUDENT IN AN ORGANIZED HEALTH CARE EDUCATION/TRAINING PROGRAM
Payer: COMMERCIAL

## 2025-04-11 ENCOUNTER — HOSPITAL ENCOUNTER (OUTPATIENT)
Dept: LAB | Facility: MEDICAL CENTER | Age: 35
End: 2025-04-11
Attending: STUDENT IN AN ORGANIZED HEALTH CARE EDUCATION/TRAINING PROGRAM
Payer: COMMERCIAL

## 2025-04-11 VITALS
SYSTOLIC BLOOD PRESSURE: 100 MMHG | DIASTOLIC BLOOD PRESSURE: 66 MMHG | HEIGHT: 64 IN | BODY MASS INDEX: 22.96 KG/M2 | HEART RATE: 73 BPM | OXYGEN SATURATION: 98 % | RESPIRATION RATE: 14 BRPM | WEIGHT: 134.48 LBS

## 2025-04-11 DIAGNOSIS — G40.109 LOCALIZATION-RELATED EPILEPSY (HCC): ICD-10-CM

## 2025-04-11 DIAGNOSIS — Z86.19 HISTORY OF VIRAL ENCEPHALITIS: ICD-10-CM

## 2025-04-11 DIAGNOSIS — G40.109 LOCALIZATION-RELATED EPILEPSY (HCC): Primary | ICD-10-CM

## 2025-04-11 PROBLEM — G93.40 ENCEPHALOPATHY: Status: RESOLVED | Noted: 2022-09-11 | Resolved: 2025-04-11

## 2025-04-11 PROCEDURE — 36415 COLL VENOUS BLD VENIPUNCTURE: CPT

## 2025-04-11 PROCEDURE — 99214 OFFICE O/P EST MOD 30 MIN: CPT | Performed by: STUDENT IN AN ORGANIZED HEALTH CARE EDUCATION/TRAINING PROGRAM

## 2025-04-11 PROCEDURE — 3078F DIAST BP <80 MM HG: CPT | Performed by: STUDENT IN AN ORGANIZED HEALTH CARE EDUCATION/TRAINING PROGRAM

## 2025-04-11 PROCEDURE — 99211 OFF/OP EST MAY X REQ PHY/QHP: CPT | Performed by: STUDENT IN AN ORGANIZED HEALTH CARE EDUCATION/TRAINING PROGRAM

## 2025-04-11 PROCEDURE — 80177 DRUG SCRN QUAN LEVETIRACETAM: CPT

## 2025-04-11 PROCEDURE — 3074F SYST BP LT 130 MM HG: CPT | Performed by: STUDENT IN AN ORGANIZED HEALTH CARE EDUCATION/TRAINING PROGRAM

## 2025-04-11 RX ORDER — LEVETIRACETAM 500 MG/1
1500 TABLET ORAL
COMMUNITY
End: 2025-04-11

## 2025-04-11 RX ORDER — LEVETIRACETAM 1000 MG/1
1000 TABLET ORAL 2 TIMES DAILY
Qty: 180 TABLET | Refills: 1 | Status: SHIPPED | OUTPATIENT
Start: 2025-04-11 | End: 2025-10-08

## 2025-04-11 ASSESSMENT — FIBROSIS 4 INDEX: FIB4 SCORE: 1.23

## 2025-04-11 ASSESSMENT — PATIENT HEALTH QUESTIONNAIRE - PHQ9: CLINICAL INTERPRETATION OF PHQ2 SCORE: 0

## 2025-04-11 NOTE — PROGRESS NOTES
[facility]  Follow up visit    Patient name: Celestine Calderón  YOB: 1990  MRN: [id number]  Date of visit: 4/11/2025      Background:    Celestine Calderón is a 34 y.o. 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 MRI of the brain notable for L temporal lobe gliosis. Last visit 7/16/2024.      Details from initial visit 10/5/2023  She had initially presented in September 2022 with multiple urgent care visits for flu-like symptoms and headache. She was discharged multiple times until her  one morning several days later heard odd noises coming from their bedroom, went to the bathroom and witnessed her having an apparent generalized tonic clonic seizure.      She had a post-ictal period and does not recall EMS arriving or being transported to [facility] via ambulance. Workup was notable for CSF studies c/w a viral versus aseptic meningitis. Virus was not identified ultimately, and autoimmune antibody panel was negative. She improved with supportive care and was discharged on 9/9/22 after being admitted on 9/6/2022.      Keppra was started for seizure treatment and she had a routine EEG during the inpatient stay which showed intermittent runs of rhythmic slowing involving the temporal region. MRI Brain showed abnormal signal in the left temporal lobe.      She had another seizure on the way home from the hospital. After discharge, she had a period of time being poorly responsive.     Essentially, she was altered after the hospitalization with poor balance, barely able to sit up in a chair at the time of follow up visits in October 2022. She had word finding difficulty that has improved gradually. Keppra dose was increased and Lyrica was added for seizure control and for pain, and her mental status improved significantly.      Medications reviewed in  detail: Lyrica was started at 50 mg BID, increased to 75 mg BID. This was primarily for pain/headaches rather than seizures. The pain has improved.  After the hospitalization the Keppra was increased from 1000 mg BID to 1000 mg TID.     Women's health considerations:  She had IUD removed in July 2022 prior to encephalitis/seizure onset 9/2022.   Found out she was pregnant January 21, 2024. Keppra level 2/7/2024 was elevated at 64. Levels trended through pregnancy.             Interval history:  She is unaccompanied to the visit.     No interval seizures.    She has remained on Keppra 1500 mg BID after pregnancy, no post-pregnancy level yet.     She is having some word finding difficulty. She was speaking with friends the other day and experienced word finding difficulty that friends noticed. She has moments of sudden irritability which are a unusual for her.      Barriers to taking medication appropriately: no, does not miss doses.      Driving: yes     Vitamin D: taking    Mood: no current concerns      4/11/2025    10:40 AM 3/11/2024    11:20 AM 7/17/2023     9:40 AM 8/24/2022     9:20 AM 5/1/2018     8:30 AM   PHQ-9 Screening   Little interest or pleasure in doing things 0 - not at all 0 - not at all 0 - not at all 0 - not at all 0 - not at all   Feeling down, depressed, or hopeless 0 - not at all 0 - not at all 0 - not at all 0 - not at all 0 - not at all   PHQ-2 Total Score 0 0 0 0 0       Current Medications:   Current Outpatient Medications:     levETIRAcetam (KEPPRA) 500 MG Tab, Take 1,500 mg by mouth., Disp: , Rfl:     ibuprofen (MOTRIN) 800 MG Tab, Take 1 Tablet by mouth every 8 hours as needed for Moderate Pain., Disp: 30 Tablet, Rfl: 0    Prenatal Multivit-Min-Fe-FA (PRENATAL 1 + IRON PO), Take  by mouth. (Patient not taking: Reported on 4/11/2025), Disp: , Rfl:     Allergies:   Allergies   Allergen Reactions    Penicillins Hives     Family member states reaction occurred in childhood.    Penicillin G           Physical Exam:   Ambulatory Vitals  Vitals:    04/11/25 1050   BP: 100/66   Pulse: 73   Resp: 14   SpO2: 98%         Constitutional: Well-developed, well-nourished, good hygiene. Appears stated age.  Respiratory: normal respiratory effort  Skin: Warm, dry, intact. No rashes observed.  Neurologic:   Mental Status: Awake, alert, oriented x 4.   Speech: Fluent with normal prosody.   Memory: Able to recall recent and remote events accurately.    Concentration: Attentive. Able to focus on history.   Fund of Knowledge: Appropriate.   Cranial Nerves:    CN II: PERRL    CN III, IV, VI: EOMI without nystagmus    CN VII: No facial asymmetry    CN VIII: Hearing intact to voice   Motor: moving all extremities fully and equally    Gait: ambulates steadily without assistive device   Movements: No resting tremors or abnormal movements observed.       Studies:      Labs reviewed:  CSF autoimmune encephalopathy antibodies negative     Component  Ref Range & Units 6 d ago 1 mo ago 2 mo ago 3 mo ago 4 mo ago 5 mo ago   Keppra  10 - 40 ug/mL 28 15 CM 33 CM 24 CM 35 CM 65 High          Imaging:   MRI Brain wwo contrast 8/9/2023  IMPRESSION:     Stable abnormal diffuse infiltrating T2 hyperintensity noted involving left temporal lobe without contrast enhancement. The differential diagnosis includes gliosis secondary to the encephalitis and low-grade glioma.     MRI Brain wwo contrast 2/16/23       EEG Results:     Ambulatory EEG 11/9/2023  INTERPRETATION:  Normal ambulatory EEG recording in the awake and drowsy/sleep state(s):  -No regional slowing or persistent focal asymmetries were seen.  -No epileptiform discharges or other epileptiform phenomena seen.  -No seizures. Clinical correlation is recommended.  -Clinical Events: events listed were not of the patient's typical seizure semiology, no abnormal epileptiform correlates.     Routine EEG 9/12/22  INTERPRETATION:   Abnormal video EEG recording in the awake, drowsy, and sleep  state(s):  - Mild to moderate background slowing suggestive of diffuse/multifocal cerebral dysfunction and consistent with a nonspecific encephalopathy.  - Intermittent runs of generalized 1.5 to 2.5 Hz rhythmic or quasirhythmic slowing was noted, often asymmetrical and dyssynchronous.  Occasionally generalized slowing was followed by rhythmic left temporal maximal l 1 to 2 Hz rhythmic activity.  These findings may reflect a mild encephalopathy as noted above.  However the dyssynchronous generalized slowing and at times focal nature of the slowing over the left temporal lobe may suggest underlying epileptogenic potential with an increased risk for seizures.  However, no seizures were seen.  Clinical and radiographic correlation recommended.  -There is ongoing concern for seizure activity consider longer-term EEG monitoring .          Assessment/Plan:   Celestine Calderón is a 34 y.o. 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 Prior EEG and imaging results are concordant. A repeat MRI from August 2023 shows ongoing T2 hyperintensity involving the left temporal lobe as compared to the initial MRI from February 2023.     Keppra levels were monitored throughout her pregnancy last year. She is having side effects possibly related to level being supratherapeutic. Counseled her to decrease the dose to 1000 mg BID. Check trough level. Check routine EEG. Counseled her to avoid driving until Keppra repeat level is obtained and therapeutic. I have a low concern she will have breakthrough seizures given stability.     1. Localization-related epilepsy (HCC) (Primary)  2. History of viral encephalitis  - KEPPRA; Future  - Referral to Neurodiagnostics (EEG,EP,EMG/NCS/DBS)  - levetiracetam (KEPPRA) 1000 MG tablet; Take 1 Tablet by mouth 2 times a day for 180 days.  Dispense: 180 Tablet; Refill: 1          Follow up in approximately 6 weeks via video to  discuss results.    Brittney Sweet M.D.   Diplomate, Neurology with Special Qualification in Epilepsy, American Board of Psychiatry and Neurology   of Clinical Neurology, [facility]  Level III Epilepsy Center, Department of Neurology at [facility]         During today's encounter we discussed available treatment options and their individual side effect profiles. Total encounter time caring for patient today 32 minutes.

## 2025-04-13 LAB — LEVETIRACETAM SERPL-MCNC: 41 UG/ML (ref 10–40)

## 2025-04-25 ENCOUNTER — OFFICE VISIT (OUTPATIENT)
Dept: URGENT CARE | Facility: PHYSICIAN GROUP | Age: 35
End: 2025-04-25
Payer: COMMERCIAL

## 2025-04-25 ENCOUNTER — APPOINTMENT (OUTPATIENT)
Dept: URGENT CARE | Facility: PHYSICIAN GROUP | Age: 35
End: 2025-04-25
Payer: COMMERCIAL

## 2025-04-25 VITALS
RESPIRATION RATE: 16 BRPM | SYSTOLIC BLOOD PRESSURE: 106 MMHG | HEART RATE: 70 BPM | TEMPERATURE: 98 F | DIASTOLIC BLOOD PRESSURE: 68 MMHG | HEIGHT: 64 IN | OXYGEN SATURATION: 98 % | BODY MASS INDEX: 23.56 KG/M2 | WEIGHT: 138 LBS

## 2025-04-25 DIAGNOSIS — B96.89 ACUTE BACTERIAL SINUSITIS: ICD-10-CM

## 2025-04-25 DIAGNOSIS — J01.90 ACUTE BACTERIAL SINUSITIS: ICD-10-CM

## 2025-04-25 PROCEDURE — 3078F DIAST BP <80 MM HG: CPT | Performed by: NURSE PRACTITIONER

## 2025-04-25 PROCEDURE — 99213 OFFICE O/P EST LOW 20 MIN: CPT | Performed by: NURSE PRACTITIONER

## 2025-04-25 PROCEDURE — 3074F SYST BP LT 130 MM HG: CPT | Performed by: NURSE PRACTITIONER

## 2025-04-25 RX ORDER — DOXYCYCLINE HYCLATE 100 MG
100 TABLET ORAL 2 TIMES DAILY
Qty: 14 TABLET | Refills: 0 | Status: SHIPPED | OUTPATIENT
Start: 2025-04-25 | End: 2025-05-02

## 2025-04-25 ASSESSMENT — FIBROSIS 4 INDEX: FIB4 SCORE: 1.23

## 2025-04-25 NOTE — PROGRESS NOTES
Verbal consent was acquired by the patient to use [employer] ambient listening note generation during this visit          Chief Complaint   Patient presents with    Sinusitis     Sinus pressure, ear pain, cough   X 2 weeks           History of Present Illness  The patient is a 34-year-old female who presents for evaluation of a sinus infection.    Symptoms consistent with a common cold have been experienced for the past two weeks, with recent escalation in severity over the last few days. Upon awakening, suspicion arose regarding the onset of a sinus infection due to pain and pressure localized behind the eyes and ears. No sore throat is reported, but a mild cough has just begun. Mucinex D and an allergy medication were taken as recommended by an urgent care physician, but these interventions have not provided relief.    Previous prescriptions include doxycycline and Zithromax Z-Claude, both of which were tolerated well. She is not currently breastfeeding.         ROS:    No severe shortness of breath   No cardiac like chest pain, as discussed   As otherwise stated in HPI    Medical/SX/ Social History:  Reviewed per chart    Pertinent Medications:    Current Outpatient Medications on File Prior to Visit   Medication Sig Dispense Refill    levetiracetam (KEPPRA) 1000 MG tablet Take 1 Tablet by mouth 2 times a day for 180 days. 180 Tablet 1    ibuprofen (MOTRIN) 800 MG Tab Take 1 Tablet by mouth every 8 hours as needed for Moderate Pain. 30 Tablet 0    Prenatal Multivit-Min-Fe-FA (PRENATAL 1 + IRON PO) Take  by mouth. (Patient not taking: Reported on 4/11/2025)       No current facility-administered medications on file prior to visit.        Allergies:    Penicillins and Penicillin g     Problem list, medications, and allergies reviewed by myself today in Epic     Physical Exam:    Vitals:    04/25/25 0925   BP: 106/68   Pulse: 70   Resp: 16   Temp: 36.7 °C (98 °F)   SpO2: 98%             Physical Exam  Vitals and  nursing note reviewed.   Constitutional:       General: She is not in acute distress.     Appearance: Normal appearance. She is ill-appearing. She is not toxic-appearing.   HENT:      Head: Normocephalic and atraumatic.      Right Ear: Tympanic membrane, ear canal and external ear normal.      Left Ear: Tympanic membrane, ear canal and external ear normal.      Nose: Nasal tenderness and congestion present.      Right Nostril: Occlusion present.      Left Nostril: Occlusion present.      Right Sinus: Maxillary sinus tenderness present.      Left Sinus: Maxillary sinus tenderness present.      Mouth/Throat:      Mouth: Mucous membranes are moist.      Pharynx: Oropharynx is clear.   Eyes:      Extraocular Movements: Extraocular movements intact.      Conjunctiva/sclera: Conjunctivae normal.      Pupils: Pupils are equal, round, and reactive to light.   Cardiovascular:      Rate and Rhythm: Normal rate and regular rhythm.      Pulses: Normal pulses.      Heart sounds: Normal heart sounds.   Pulmonary:      Effort: Pulmonary effort is normal.   Musculoskeletal:         General: Normal range of motion.      Cervical back: Normal range of motion and neck supple.   Skin:     General: Skin is warm.      Capillary Refill: Capillary refill takes less than 2 seconds.   Neurological:      General: No focal deficit present.      Mental Status: She is alert and oriented to person, place, and time.          Medical Decision making and plan :  I personally reviewed prior external notes and test results pertinent to today's visit. Pt is clinically stable at today's acute urgent care visit.  Patient appears nontoxic with no acute distress noted. Appropriate for outpatient care at this time.    Pleasant 34 y.o. female presented clinic with:     Assessment & Plan  1. Sinusitis.  - Symptoms include pain and pressure behind the eyes and ears, and a mild cough.  - Physical exam reveals postnasal drip but no signs of an infected sore  throat.  - Discussion includes the ineffectiveness of Mucinex D and allergy pills in providing relief.  Flonase advised.   - Allergic to penicillin - Prescribed doxycycline 100 mg, to be taken twice daily for one week, and advised to continue current allergy medications. Prescription sent to [facility] on [address].      Shared decision-making was utilized with patient for treatment plan. Medication discussed included indication for use and the potential benefits and side effects. Education was provided regarding the aforementioned assessments.  Differential Diagnosis, natural history, and supportive care discussed. All of the patient's questions were answered to their satisfaction at the time of discharge. Patient was encouraged to monitor symptoms closely. Those signs and symptoms which would warrant concern and mandate seeking a higher level of service through the emergency department discussed at length.  Patient stated agreement and understanding of this plan of care.    Disposition:  Home in stable condition     Voice Recognition Disclaimer:  Portions of this document were created using voice recognition software and Factual technology provided by [employer]. The software does have a chance of producing errors of grammar and possibly content. I have made every reasonable attempt to correct obvious errors, but there may be errors of grammar and possibly content that I did not discover before finalizing the  documentation.    JOSE Hooper.

## 2025-05-23 ENCOUNTER — TELEMEDICINE (OUTPATIENT)
Dept: NEUROLOGY | Facility: MEDICAL CENTER | Age: 35
End: 2025-05-23
Attending: STUDENT IN AN ORGANIZED HEALTH CARE EDUCATION/TRAINING PROGRAM
Payer: COMMERCIAL

## 2025-05-23 VITALS — WEIGHT: 136 LBS | BODY MASS INDEX: 23.22 KG/M2 | HEIGHT: 64 IN

## 2025-05-23 DIAGNOSIS — Z86.19 HISTORY OF VIRAL ENCEPHALITIS: ICD-10-CM

## 2025-05-23 DIAGNOSIS — G40.109 LOCALIZATION-RELATED EPILEPSY (HCC): Primary | ICD-10-CM

## 2025-05-23 PROCEDURE — 99213 OFFICE O/P EST LOW 20 MIN: CPT | Mod: 95 | Performed by: STUDENT IN AN ORGANIZED HEALTH CARE EDUCATION/TRAINING PROGRAM

## 2025-05-23 ASSESSMENT — FIBROSIS 4 INDEX: FIB4 SCORE: 1.23

## 2025-05-23 ASSESSMENT — PATIENT HEALTH QUESTIONNAIRE - PHQ9: CLINICAL INTERPRETATION OF PHQ2 SCORE: 0

## 2025-05-23 NOTE — PROGRESS NOTES
[facility]  Follow up visit    Patient name: Celestine Calderón  YOB: 1990  MRN: [id number]  Date of visit: 5/23/2025      This visit was conducted via Teams using secure and encrypted videoconferencing technology.   The patient was located in the [facility] at their home.     The patient's identity was confirmed and verbal consent was obtained for this virtual visit.           Background:    Celestine Calderón is a 34 y.o. 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 MRI of the brain notable for L temporal lobe gliosis. Last visit 4/11/2025.     Details from initial visit 10/5/2023  She had initially presented in September 2022 with multiple urgent care visits for flu-like symptoms and headache. She was discharged multiple times until her  one morning several days later heard odd noises coming from their bedroom, went to the bathroom and witnessed her having an apparent generalized tonic clonic seizure.      She had a post-ictal period and does not recall EMS arriving or being transported to [facility] via ambulance. Workup was notable for CSF studies c/w a viral versus aseptic meningitis. Virus was not identified ultimately, and autoimmune antibody panel was negative. She improved with supportive care and was discharged on 9/9/22 after being admitted on 9/6/2022.      Keppra was started for seizure treatment and she had a routine EEG during the inpatient stay which showed intermittent runs of rhythmic slowing involving the temporal region. MRI Brain showed abnormal signal in the left temporal lobe.      She had another seizure on the way home from the hospital. After discharge, she had a period of time being poorly responsive.     Essentially, she was altered after the hospitalization with poor balance, barely able to sit up in a chair at  the time of follow up visits in October 2022. She had word finding difficulty that has improved gradually. Keppra dose was increased and Lyrica was added for seizure control and for pain, and her mental status improved significantly.      Medications reviewed in detail: Lyrica was started at 50 mg BID, increased to 75 mg BID. This was primarily for pain/headaches rather than seizures. The pain has improved.  After the hospitalization the Keppra was increased from 1000 mg BID to 1000 mg TID.     Women's health considerations:  She had IUD removed in July 2022 prior to encephalitis/seizure onset 9/2022.   Found out she was pregnant January 21, 2024. Keppra level 2/7/2024 was elevated at 64. Levels trended through pregnancy.             Interval history:  She is unaccompanied to the visit.     No interval seizures.  She has been doing well.    She is taking Keppra 1000 mg BID, adjusted post-pregnancy. At the time she had the last Keppra level which was mildly supratherapeutic, she was taking 1500 mg BID.      Barriers to taking medication appropriately: no, does not miss doses.      Driving: yes     Vitamin D: taking    Mood: no current concerns      4/11/2025    10:40 AM 3/11/2024    11:20 AM 7/17/2023     9:40 AM 8/24/2022     9:20 AM 5/1/2018     8:30 AM   PHQ-9 Screening   Little interest or pleasure in doing things 0 - not at all 0 - not at all 0 - not at all 0 - not at all 0 - not at all   Feeling down, depressed, or hopeless 0 - not at all 0 - not at all 0 - not at all 0 - not at all 0 - not at all   PHQ-2 Total Score 0 0 0 0 0       Current Medications:   Current Outpatient Medications:     levetiracetam (KEPPRA) 1000 MG tablet, Take 1 Tablet by mouth 2 times a day for 180 days., Disp: 180 Tablet, Rfl: 1    ibuprofen (MOTRIN) 800 MG Tab, Take 1 Tablet by mouth every 8 hours as needed for Moderate Pain., Disp: 30 Tablet, Rfl: 0    Prenatal Multivit-Min-Fe-FA (PRENATAL 1 + IRON PO), Take  by mouth. (Patient not  taking: Reported on 4/11/2025), Disp: , Rfl:     Allergies:   Allergies   Allergen Reactions    Penicillins Hives     Family member states reaction occurred in childhood.    Penicillin G          Physical Exam:   Ambulatory Vitals  There were no vitals filed for this visit.    Exam limited by telehealth format of visit.    Constitutional: Well-developed, well-nourished, good hygiene. Appears stated age.  Respiratory: normal respiratory effort  Skin: Warm, dry, intact. No rashes observed.  Neurologic:   Mental Status: Awake, alert, oriented x 4.   Speech: Fluent with normal prosody.   Memory: Able to recall recent and remote events accurately.    Concentration: Attentive. Able to focus on history.   Fund of Knowledge: Appropriate.   Cranial Nerves:    CN II: PERRL    CN III, IV, VI: EOMI without nystagmus    CN VII: No facial asymmetry    CN VIII: Hearing intact to voice   Motor: moving all extremities fully and equally    Gait: ambulates steadily without assistive device   Movements: No resting tremors or abnormal movements observed.       Studies:      Labs reviewed:  CSF autoimmune encephalopathy antibodies negative               Component  Ref Range & Units (hover) 1 mo ago  (4/11/25) 8 mo ago  (9/13/24) 9 mo ago  (8/14/24) 10 mo ago  (7/10/24) 11 mo ago  (6/13/24) 1 yr ago  (5/10/24) 1 yr ago  (4/11/24)   Keppra 41 High  18 CM 56 High  CM 28 CM 15 CM 33 C        Imaging:   MRI Brain wwo contrast 8/9/2023  IMPRESSION:     Stable abnormal diffuse infiltrating T2 hyperintensity noted involving left temporal lobe without contrast enhancement. The differential diagnosis includes gliosis secondary to the encephalitis and low-grade glioma.     MRI Brain wwo contrast 2/16/23       EEG Results:     Ambulatory EEG 11/9/2023  INTERPRETATION:  Normal ambulatory EEG recording in the awake and drowsy/sleep state(s):  -No regional slowing or persistent focal asymmetries were seen.  -No epileptiform discharges or other  epileptiform phenomena seen.  -No seizures. Clinical correlation is recommended.  -Clinical Events: events listed were not of the patient's typical seizure semiology, no abnormal epileptiform correlates.     Routine EEG 9/12/22  INTERPRETATION:   Abnormal video EEG recording in the awake, drowsy, and sleep state(s):  - Mild to moderate background slowing suggestive of diffuse/multifocal cerebral dysfunction and consistent with a nonspecific encephalopathy.  - Intermittent runs of generalized 1.5 to 2.5 Hz rhythmic or quasirhythmic slowing was noted, often asymmetrical and dyssynchronous.  Occasionally generalized slowing was followed by rhythmic left temporal maximal l 1 to 2 Hz rhythmic activity.  These findings may reflect a mild encephalopathy as noted above.  However the dyssynchronous generalized slowing and at times focal nature of the slowing over the left temporal lobe may suggest underlying epileptogenic potential with an increased risk for seizures.  However, no seizures were seen.  Clinical and radiographic correlation recommended.  -There is ongoing concern for seizure activity consider longer-term EEG monitoring .          Assessment/Plan:   Celestine Calderón is a 34 y.o. 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 Prior EEG and imaging results are concordant. A repeat MRI from August 2023 shows ongoing T2 hyperintensity involving the left temporal lobe as compared to the initial MRI from February 2023.     Persistent imaging abnormality may be a risk factor for ongoing seizures if she were to stop anti-seizure medication. I have advised that she hold off on the AM dose of Keppra pending the routine EEG and take it as soon as it finishes so as not to miss any doses. If the EEG is normal and captures sleep, we could discuss decreasing Keppra dose to 750 mg BID. If abnormal, would recommend lifelong treatment with Keppra or other  anti-seizure medication.         Follow up in 4-6 months.     Brittney Sweet M.D.   Diplomate, Neurology with Special Qualification in Epilepsy, American Board of Psychiatry and Neurology   of Clinical Neurology, [facility] of Medicine  Level III Epilepsy Center, Department of Neurology at [facility]         During today's encounter we discussed available treatment options and their individual side effect profiles. Total encounter time caring for patient today 20 minutes.

## 2025-06-02 ENCOUNTER — NON-PROVIDER VISIT (OUTPATIENT)
Dept: NEUROLOGY | Facility: MEDICAL CENTER | Age: 35
End: 2025-06-02
Attending: STUDENT IN AN ORGANIZED HEALTH CARE EDUCATION/TRAINING PROGRAM
Payer: COMMERCIAL

## 2025-06-02 DIAGNOSIS — G40.109 LOCALIZATION-RELATED EPILEPSY (HCC): Primary | ICD-10-CM

## 2025-06-02 PROCEDURE — 95819 EEG AWAKE AND ASLEEP: CPT | Performed by: STUDENT IN AN ORGANIZED HEALTH CARE EDUCATION/TRAINING PROGRAM

## 2025-06-02 PROCEDURE — 95819 EEG AWAKE AND ASLEEP: CPT | Mod: 26 | Performed by: STUDENT IN AN ORGANIZED HEALTH CARE EDUCATION/TRAINING PROGRAM

## 2025-06-02 NOTE — PROCEDURES
OUTPATIENT ROUTINE VIDEO ELECTROENCEPHALOGRAM REPORT    REFERRING PROVIDER: Brittney Sweet M.D.  [address] [address]  [place] [address]  DOS: 06/02/2025    STUDY DURATION: 0 hours and 23 minutes of total recording time.     INDICATION:  Celestine Calderón 34 y.o. female presenting with seizure(s)    RELEVANT MEDICATIONS/TREATMENTS:   Current Outpatient Medications   Medication Instructions    levetiracetam (KEPPRA) 1,000 mg, Oral, 2 TIMES DAILY        TECHNIQUE:   Routine VEEG was set up by a Neurodiagnostic technologist who performed education to the patient and staff. A minimum of 23 electrodes and 23 channel recording was setup and performed by Neurodiagnostic technologist, in accordance with the international 10-20 system. The study was reviewed in bipolar and referential montages. The recording examined the patient in the  awake, drowsy, and sleep state(s).     DESCRIPTION OF THE RECORD:  During wakefulness, the background was continuous and showed a 10 Hz posterior dominant rhythm.  There was reactivity to eye closure/opening.  An anterior-posterior gradient was noted with faster beta frequencies seen anteriorly.  During drowsiness, theta/delta frequencies were seen.    EEG Sleep: Sleep was captured and was characterized by diffuse background delta/theta activity with a loss of myogenic artifact.  N2 sleep transients in the form of sleep spindles and vertex waves were seen in the leads over the central regions.     ICTAL AND INTERICTAL FINDINGS:   1) Rare left temporal sharp waves  2) No seizures    Example of left temporal sharp wave, sensitivity 10 uV/mm      ACTIVATION PROCEDURES:   Intermittent Photic stimulation was performed in a stepwise fashion from 1 to 30 Hz and did not elicited any abnormalities on EEG.     EKG: Sampling of the EKG recording showed sinus rhythm    EVENTS:  None    INTERPRETATION:   Abnormal video EEG recording in the awake, drowsy, and sleep state(s):  1) Rare left temporal  sharp waves. This finding indicates a predisposition for seizures to arise from this region, consistent with known structural abnormality.   2) No seizures.    Brittney Sweet MD  Department of Neurology at [facility]  General Neurologist and Epileptologist   of Clinical Neurology, [facility] of Medicine.